# Patient Record
Sex: FEMALE | Race: WHITE | Employment: OTHER | URBAN - METROPOLITAN AREA
[De-identification: names, ages, dates, MRNs, and addresses within clinical notes are randomized per-mention and may not be internally consistent; named-entity substitution may affect disease eponyms.]

---

## 2017-01-05 RX ORDER — LISINOPRIL 10 MG/1
TABLET ORAL
Qty: 90 TABLET | Refills: 3 | Status: SHIPPED | OUTPATIENT
Start: 2017-01-05 | End: 2017-05-22 | Stop reason: SDUPTHER

## 2017-01-19 RX ORDER — ATORVASTATIN CALCIUM 40 MG/1
TABLET, FILM COATED ORAL
Qty: 30 TABLET | Refills: 11 | Status: SHIPPED | OUTPATIENT
Start: 2017-01-19 | End: 2018-01-16 | Stop reason: SDUPTHER

## 2017-01-24 ENCOUNTER — OFFICE VISIT (OUTPATIENT)
Dept: CARDIOLOGY CLINIC | Age: 72
End: 2017-01-24

## 2017-01-24 VITALS
SYSTOLIC BLOOD PRESSURE: 132 MMHG | HEIGHT: 66 IN | BODY MASS INDEX: 27.8 KG/M2 | WEIGHT: 173 LBS | HEART RATE: 58 BPM | DIASTOLIC BLOOD PRESSURE: 82 MMHG

## 2017-01-24 DIAGNOSIS — I42.9 CARDIOMYOPATHY (HCC): Chronic | ICD-10-CM

## 2017-01-24 DIAGNOSIS — I25.10 ATHEROSCLEROSIS OF NATIVE CORONARY ARTERY OF NATIVE HEART WITHOUT ANGINA PECTORIS: Primary | Chronic | ICD-10-CM

## 2017-01-24 DIAGNOSIS — R06.02 SHORTNESS OF BREATH: ICD-10-CM

## 2017-01-24 PROCEDURE — 99214 OFFICE O/P EST MOD 30 MIN: CPT | Performed by: INTERNAL MEDICINE

## 2017-01-24 PROCEDURE — G8427 DOCREV CUR MEDS BY ELIG CLIN: HCPCS | Performed by: INTERNAL MEDICINE

## 2017-01-24 PROCEDURE — 1036F TOBACCO NON-USER: CPT | Performed by: INTERNAL MEDICINE

## 2017-01-24 PROCEDURE — 4040F PNEUMOC VAC/ADMIN/RCVD: CPT | Performed by: INTERNAL MEDICINE

## 2017-01-24 PROCEDURE — G8484 FLU IMMUNIZE NO ADMIN: HCPCS | Performed by: INTERNAL MEDICINE

## 2017-01-24 PROCEDURE — 3014F SCREEN MAMMO DOC REV: CPT | Performed by: INTERNAL MEDICINE

## 2017-01-24 PROCEDURE — G8420 CALC BMI NORM PARAMETERS: HCPCS | Performed by: INTERNAL MEDICINE

## 2017-01-24 PROCEDURE — 3017F COLORECTAL CA SCREEN DOC REV: CPT | Performed by: INTERNAL MEDICINE

## 2017-01-24 PROCEDURE — 1123F ACP DISCUSS/DSCN MKR DOCD: CPT | Performed by: INTERNAL MEDICINE

## 2017-01-24 PROCEDURE — G8400 PT W/DXA NO RESULTS DOC: HCPCS | Performed by: INTERNAL MEDICINE

## 2017-01-24 PROCEDURE — 1090F PRES/ABSN URINE INCON ASSESS: CPT | Performed by: INTERNAL MEDICINE

## 2017-01-24 PROCEDURE — G8598 ASA/ANTIPLAT THER USED: HCPCS | Performed by: INTERNAL MEDICINE

## 2017-01-24 RX ORDER — CARVEDILOL 6.25 MG/1
TABLET ORAL
Qty: 180 TABLET | Refills: 3 | Status: SHIPPED | OUTPATIENT
Start: 2017-01-24 | End: 2017-01-24

## 2017-01-25 ENCOUNTER — OFFICE VISIT (OUTPATIENT)
Dept: CARDIOLOGY CLINIC | Age: 72
End: 2017-01-25

## 2017-01-25 ENCOUNTER — HOSPITAL ENCOUNTER (OUTPATIENT)
Dept: CT IMAGING | Age: 72
Discharge: HOME | End: 2017-01-25
Attending: INTERNAL MEDICINE | Admitting: INTERNAL MEDICINE

## 2017-01-25 ENCOUNTER — HOSPITAL ENCOUNTER (OUTPATIENT)
Dept: NON INVASIVE DIAGNOSTICS | Age: 72
Discharge: HOME | End: 2017-01-25
Attending: INTERNAL MEDICINE | Admitting: INTERNAL MEDICINE

## 2017-01-25 VITALS — WEIGHT: 173 LBS | BODY MASS INDEX: 27.92 KG/M2 | SYSTOLIC BLOOD PRESSURE: 118 MMHG | DIASTOLIC BLOOD PRESSURE: 60 MMHG

## 2017-01-25 DIAGNOSIS — R06.02 SHORTNESS OF BREATH: ICD-10-CM

## 2017-01-25 DIAGNOSIS — I25.10 ATHEROSCLEROTIC HEART DISEASE OF NATIVE CORONARY ARTERY WITHOUT ANGINA PECTORIS: ICD-10-CM

## 2017-01-25 DIAGNOSIS — I42.9 CARDIOMYOPATHY (HCC): Chronic | ICD-10-CM

## 2017-01-25 DIAGNOSIS — R09.02 HYPOXIA: ICD-10-CM

## 2017-01-25 DIAGNOSIS — I25.10 ATHEROSCLEROSIS OF NATIVE CORONARY ARTERY OF NATIVE HEART WITHOUT ANGINA PECTORIS: Primary | Chronic | ICD-10-CM

## 2017-01-25 LAB
ALBUMIN SERPL-MCNC: 4 G/DL (ref 3.4–5)
ANION GAP SERPL CALCULATED.3IONS-SCNC: 9 MMOL/L (ref 3–16)
BUN BLDV-MCNC: 12 MG/DL (ref 7–20)
CALCIUM SERPL-MCNC: 9.3 MG/DL (ref 8.3–10.6)
CHLORIDE BLD-SCNC: 97 MMOL/L (ref 99–110)
CO2: 31 MMOL/L (ref 21–32)
CREAT SERPL-MCNC: 0.7 MG/DL (ref 0.6–1.2)
GFR AFRICAN AMERICAN: >60
GFR NON-AFRICAN AMERICAN: >60
GLUCOSE BLD-MCNC: 127 MG/DL (ref 70–99)
LV EF: 50 %
LV EF: 62 %
LVEF MODALITY: NORMAL
LVEF MODALITY: NORMAL
PHOSPHORUS: 3.5 MG/DL (ref 2.5–4.9)
POTASSIUM SERPL-SCNC: 4.4 MMOL/L (ref 3.5–5.1)
SODIUM BLD-SCNC: 137 MMOL/L (ref 136–145)

## 2017-01-25 PROCEDURE — 3014F SCREEN MAMMO DOC REV: CPT | Performed by: INTERNAL MEDICINE

## 2017-01-25 PROCEDURE — G8420 CALC BMI NORM PARAMETERS: HCPCS | Performed by: INTERNAL MEDICINE

## 2017-01-25 PROCEDURE — 4040F PNEUMOC VAC/ADMIN/RCVD: CPT | Performed by: INTERNAL MEDICINE

## 2017-01-25 PROCEDURE — G8427 DOCREV CUR MEDS BY ELIG CLIN: HCPCS | Performed by: INTERNAL MEDICINE

## 2017-01-25 PROCEDURE — 1123F ACP DISCUSS/DSCN MKR DOCD: CPT | Performed by: INTERNAL MEDICINE

## 2017-01-25 PROCEDURE — G8484 FLU IMMUNIZE NO ADMIN: HCPCS | Performed by: INTERNAL MEDICINE

## 2017-01-25 PROCEDURE — 3017F COLORECTAL CA SCREEN DOC REV: CPT | Performed by: INTERNAL MEDICINE

## 2017-01-25 PROCEDURE — G8400 PT W/DXA NO RESULTS DOC: HCPCS | Performed by: INTERNAL MEDICINE

## 2017-01-25 PROCEDURE — 1036F TOBACCO NON-USER: CPT | Performed by: INTERNAL MEDICINE

## 2017-01-25 PROCEDURE — 1090F PRES/ABSN URINE INCON ASSESS: CPT | Performed by: INTERNAL MEDICINE

## 2017-01-25 PROCEDURE — G8598 ASA/ANTIPLAT THER USED: HCPCS | Performed by: INTERNAL MEDICINE

## 2017-01-25 PROCEDURE — 99214 OFFICE O/P EST MOD 30 MIN: CPT | Performed by: INTERNAL MEDICINE

## 2017-01-25 RX ORDER — AMINOPHYLLINE DIHYDRATE 25 MG/ML
100 INJECTION, SOLUTION INTRAVENOUS ONCE
Status: COMPLETED | OUTPATIENT
Start: 2017-01-25 | End: 2017-01-25

## 2017-01-25 RX ADMIN — AMINOPHYLLINE DIHYDRATE 100 MG: 25 INJECTION, SOLUTION INTRAVENOUS at 08:57

## 2017-01-26 ENCOUNTER — OFFICE VISIT (OUTPATIENT)
Dept: PULMONOLOGY | Age: 72
End: 2017-01-26

## 2017-01-26 ENCOUNTER — TELEPHONE (OUTPATIENT)
Dept: CARDIOLOGY CLINIC | Age: 72
End: 2017-01-26

## 2017-01-26 VITALS
DIASTOLIC BLOOD PRESSURE: 80 MMHG | HEIGHT: 66 IN | WEIGHT: 175 LBS | SYSTOLIC BLOOD PRESSURE: 152 MMHG | OXYGEN SATURATION: 94 % | HEART RATE: 57 BPM | RESPIRATION RATE: 18 BRPM | BODY MASS INDEX: 28.12 KG/M2

## 2017-01-26 DIAGNOSIS — R06.02 SHORTNESS OF BREATH: Primary | ICD-10-CM

## 2017-01-26 DIAGNOSIS — J43.2 CENTRILOBULAR EMPHYSEMA (HCC): ICD-10-CM

## 2017-01-26 PROCEDURE — G8420 CALC BMI NORM PARAMETERS: HCPCS | Performed by: INTERNAL MEDICINE

## 2017-01-26 PROCEDURE — 3017F COLORECTAL CA SCREEN DOC REV: CPT | Performed by: INTERNAL MEDICINE

## 2017-01-26 PROCEDURE — G8598 ASA/ANTIPLAT THER USED: HCPCS | Performed by: INTERNAL MEDICINE

## 2017-01-26 PROCEDURE — G8484 FLU IMMUNIZE NO ADMIN: HCPCS | Performed by: INTERNAL MEDICINE

## 2017-01-26 PROCEDURE — 99204 OFFICE O/P NEW MOD 45 MIN: CPT | Performed by: INTERNAL MEDICINE

## 2017-01-26 PROCEDURE — 3023F SPIROM DOC REV: CPT | Performed by: INTERNAL MEDICINE

## 2017-01-26 PROCEDURE — G8926 SPIRO NO PERF OR DOC: HCPCS | Performed by: INTERNAL MEDICINE

## 2017-01-26 PROCEDURE — G8427 DOCREV CUR MEDS BY ELIG CLIN: HCPCS | Performed by: INTERNAL MEDICINE

## 2017-01-26 PROCEDURE — 1123F ACP DISCUSS/DSCN MKR DOCD: CPT | Performed by: INTERNAL MEDICINE

## 2017-01-26 PROCEDURE — 4040F PNEUMOC VAC/ADMIN/RCVD: CPT | Performed by: INTERNAL MEDICINE

## 2017-01-26 PROCEDURE — 1036F TOBACCO NON-USER: CPT | Performed by: INTERNAL MEDICINE

## 2017-01-26 PROCEDURE — 3014F SCREEN MAMMO DOC REV: CPT | Performed by: INTERNAL MEDICINE

## 2017-01-26 PROCEDURE — G8400 PT W/DXA NO RESULTS DOC: HCPCS | Performed by: INTERNAL MEDICINE

## 2017-01-26 PROCEDURE — 1090F PRES/ABSN URINE INCON ASSESS: CPT | Performed by: INTERNAL MEDICINE

## 2017-01-26 RX ORDER — BUDESONIDE AND FORMOTEROL FUMARATE DIHYDRATE 160; 4.5 UG/1; UG/1
2 AEROSOL RESPIRATORY (INHALATION) 2 TIMES DAILY
COMMUNITY
End: 2017-03-03

## 2017-01-31 ENCOUNTER — HOSPITAL ENCOUNTER (OUTPATIENT)
Dept: PULMONOLOGY | Age: 72
Discharge: HOME | End: 2017-01-31
Attending: INTERNAL MEDICINE | Admitting: INTERNAL MEDICINE

## 2017-01-31 DIAGNOSIS — R06.02 SHORTNESS OF BREATH: ICD-10-CM

## 2017-01-31 LAB
DLCO: NORMAL ML/MMHG SEC
FEV1/FVC: NORMAL %
FEV1: NORMAL LITERS
FVC: NORMAL LITERS
TLC: NORMAL LITERS

## 2017-01-31 PROCEDURE — 94729 DIFFUSING CAPACITY: CPT | Performed by: INTERNAL MEDICINE

## 2017-01-31 PROCEDURE — 94727 GAS DIL/WSHOT DETER LNG VOL: CPT | Performed by: INTERNAL MEDICINE

## 2017-01-31 PROCEDURE — 94060 EVALUATION OF WHEEZING: CPT | Performed by: INTERNAL MEDICINE

## 2017-01-31 RX ORDER — ALBUTEROL SULFATE 90 UG/1
4 AEROSOL, METERED RESPIRATORY (INHALATION) ONCE
Status: COMPLETED | OUTPATIENT
Start: 2017-01-31 | End: 2017-01-31

## 2017-01-31 RX ADMIN — ALBUTEROL SULFATE 4 PUFF: 90 AEROSOL, METERED RESPIRATORY (INHALATION) at 09:41

## 2017-02-06 ENCOUNTER — TELEPHONE (OUTPATIENT)
Dept: PULMONOLOGY | Age: 72
End: 2017-02-06

## 2017-02-10 ENCOUNTER — OFFICE VISIT (OUTPATIENT)
Dept: CARDIOTHORACIC SURGERY | Age: 72
End: 2017-02-10

## 2017-02-10 VITALS
SYSTOLIC BLOOD PRESSURE: 132 MMHG | DIASTOLIC BLOOD PRESSURE: 68 MMHG | WEIGHT: 170.8 LBS | HEIGHT: 65 IN | BODY MASS INDEX: 28.46 KG/M2

## 2017-02-10 DIAGNOSIS — I25.10 ATHEROSCLEROSIS OF NATIVE CORONARY ARTERY OF NATIVE HEART WITHOUT ANGINA PECTORIS: Primary | ICD-10-CM

## 2017-02-10 PROCEDURE — G8420 CALC BMI NORM PARAMETERS: HCPCS | Performed by: SURGERY

## 2017-02-10 PROCEDURE — 1090F PRES/ABSN URINE INCON ASSESS: CPT | Performed by: SURGERY

## 2017-02-10 PROCEDURE — 1036F TOBACCO NON-USER: CPT | Performed by: SURGERY

## 2017-02-10 PROCEDURE — G8400 PT W/DXA NO RESULTS DOC: HCPCS | Performed by: SURGERY

## 2017-02-10 PROCEDURE — 3014F SCREEN MAMMO DOC REV: CPT | Performed by: SURGERY

## 2017-02-10 PROCEDURE — G8484 FLU IMMUNIZE NO ADMIN: HCPCS | Performed by: SURGERY

## 2017-02-10 PROCEDURE — G8427 DOCREV CUR MEDS BY ELIG CLIN: HCPCS | Performed by: SURGERY

## 2017-02-10 PROCEDURE — 4040F PNEUMOC VAC/ADMIN/RCVD: CPT | Performed by: SURGERY

## 2017-02-10 PROCEDURE — G8598 ASA/ANTIPLAT THER USED: HCPCS | Performed by: SURGERY

## 2017-02-10 PROCEDURE — 3017F COLORECTAL CA SCREEN DOC REV: CPT | Performed by: SURGERY

## 2017-02-10 PROCEDURE — 1123F ACP DISCUSS/DSCN MKR DOCD: CPT | Performed by: SURGERY

## 2017-02-10 PROCEDURE — 99203 OFFICE O/P NEW LOW 30 MIN: CPT | Performed by: SURGERY

## 2017-02-10 RX ORDER — BIOTIN 10 MG
10 TABLET ORAL DAILY
COMMUNITY
End: 2019-05-15

## 2017-02-10 RX ORDER — M-VIT,TX,IRON,MINS/CALC/FOLIC 27MG-0.4MG
1 TABLET ORAL DAILY
COMMUNITY
End: 2020-07-22

## 2017-02-16 ENCOUNTER — OFFICE VISIT (OUTPATIENT)
Dept: ORTHOPEDIC SURGERY | Age: 72
End: 2017-02-16

## 2017-02-16 VITALS
WEIGHT: 170 LBS | SYSTOLIC BLOOD PRESSURE: 138 MMHG | BODY MASS INDEX: 28.32 KG/M2 | DIASTOLIC BLOOD PRESSURE: 79 MMHG | HEIGHT: 65 IN | HEART RATE: 58 BPM

## 2017-02-16 DIAGNOSIS — M20.012 MALLET FINGER, ACQUIRED, LEFT: Primary | ICD-10-CM

## 2017-02-16 PROBLEM — M20.019 MALLET FINGER, ACQUIRED: Status: ACTIVE | Noted: 2017-02-16

## 2017-02-16 PROCEDURE — 99203 OFFICE O/P NEW LOW 30 MIN: CPT | Performed by: ORTHOPAEDIC SURGERY

## 2017-02-16 PROCEDURE — G8420 CALC BMI NORM PARAMETERS: HCPCS | Performed by: ORTHOPAEDIC SURGERY

## 2017-02-16 PROCEDURE — G8400 PT W/DXA NO RESULTS DOC: HCPCS | Performed by: ORTHOPAEDIC SURGERY

## 2017-02-16 PROCEDURE — 1123F ACP DISCUSS/DSCN MKR DOCD: CPT | Performed by: ORTHOPAEDIC SURGERY

## 2017-02-16 PROCEDURE — 3014F SCREEN MAMMO DOC REV: CPT | Performed by: ORTHOPAEDIC SURGERY

## 2017-02-16 PROCEDURE — 4040F PNEUMOC VAC/ADMIN/RCVD: CPT | Performed by: ORTHOPAEDIC SURGERY

## 2017-02-16 PROCEDURE — 1036F TOBACCO NON-USER: CPT | Performed by: ORTHOPAEDIC SURGERY

## 2017-02-16 PROCEDURE — 3017F COLORECTAL CA SCREEN DOC REV: CPT | Performed by: ORTHOPAEDIC SURGERY

## 2017-02-16 PROCEDURE — G8598 ASA/ANTIPLAT THER USED: HCPCS | Performed by: ORTHOPAEDIC SURGERY

## 2017-02-16 PROCEDURE — G8427 DOCREV CUR MEDS BY ELIG CLIN: HCPCS | Performed by: ORTHOPAEDIC SURGERY

## 2017-02-16 PROCEDURE — 1090F PRES/ABSN URINE INCON ASSESS: CPT | Performed by: ORTHOPAEDIC SURGERY

## 2017-02-16 PROCEDURE — G8484 FLU IMMUNIZE NO ADMIN: HCPCS | Performed by: ORTHOPAEDIC SURGERY

## 2017-03-03 ENCOUNTER — OFFICE VISIT (OUTPATIENT)
Dept: CARDIOLOGY CLINIC | Age: 72
End: 2017-03-03

## 2017-03-03 VITALS
HEART RATE: 72 BPM | WEIGHT: 170 LBS | SYSTOLIC BLOOD PRESSURE: 132 MMHG | HEIGHT: 65 IN | DIASTOLIC BLOOD PRESSURE: 82 MMHG | BODY MASS INDEX: 28.32 KG/M2

## 2017-03-03 DIAGNOSIS — E78.5 HYPERLIPIDEMIA, UNSPECIFIED HYPERLIPIDEMIA TYPE: Chronic | ICD-10-CM

## 2017-03-03 DIAGNOSIS — I42.9 CARDIOMYOPATHY (HCC): Chronic | ICD-10-CM

## 2017-03-03 DIAGNOSIS — I25.10 ATHEROSCLEROSIS OF NATIVE CORONARY ARTERY OF NATIVE HEART WITHOUT ANGINA PECTORIS: Primary | Chronic | ICD-10-CM

## 2017-03-03 PROCEDURE — 1090F PRES/ABSN URINE INCON ASSESS: CPT | Performed by: INTERNAL MEDICINE

## 2017-03-03 PROCEDURE — 3014F SCREEN MAMMO DOC REV: CPT | Performed by: INTERNAL MEDICINE

## 2017-03-03 PROCEDURE — G8484 FLU IMMUNIZE NO ADMIN: HCPCS | Performed by: INTERNAL MEDICINE

## 2017-03-03 PROCEDURE — G8598 ASA/ANTIPLAT THER USED: HCPCS | Performed by: INTERNAL MEDICINE

## 2017-03-03 PROCEDURE — 99214 OFFICE O/P EST MOD 30 MIN: CPT | Performed by: INTERNAL MEDICINE

## 2017-03-03 PROCEDURE — G8400 PT W/DXA NO RESULTS DOC: HCPCS | Performed by: INTERNAL MEDICINE

## 2017-03-03 PROCEDURE — G8420 CALC BMI NORM PARAMETERS: HCPCS | Performed by: INTERNAL MEDICINE

## 2017-03-03 PROCEDURE — G8427 DOCREV CUR MEDS BY ELIG CLIN: HCPCS | Performed by: INTERNAL MEDICINE

## 2017-03-03 PROCEDURE — 1036F TOBACCO NON-USER: CPT | Performed by: INTERNAL MEDICINE

## 2017-03-03 PROCEDURE — 3017F COLORECTAL CA SCREEN DOC REV: CPT | Performed by: INTERNAL MEDICINE

## 2017-03-03 PROCEDURE — 4040F PNEUMOC VAC/ADMIN/RCVD: CPT | Performed by: INTERNAL MEDICINE

## 2017-03-03 PROCEDURE — 1123F ACP DISCUSS/DSCN MKR DOCD: CPT | Performed by: INTERNAL MEDICINE

## 2017-03-03 RX ORDER — ALBUTEROL SULFATE 90 UG/1
2 AEROSOL, METERED RESPIRATORY (INHALATION) EVERY 6 HOURS PRN
Qty: 1 INHALER | Refills: 2 | Status: SHIPPED | OUTPATIENT
Start: 2017-03-03 | End: 2018-11-12 | Stop reason: SDUPTHER

## 2017-03-09 ENCOUNTER — OFFICE VISIT (OUTPATIENT)
Dept: ORTHOPEDIC SURGERY | Age: 72
End: 2017-03-09

## 2017-03-09 VITALS
HEART RATE: 54 BPM | BODY MASS INDEX: 29.16 KG/M2 | HEIGHT: 65 IN | RESPIRATION RATE: 16 BRPM | WEIGHT: 175 LBS | DIASTOLIC BLOOD PRESSURE: 74 MMHG | SYSTOLIC BLOOD PRESSURE: 139 MMHG

## 2017-03-09 DIAGNOSIS — M20.012 MALLET FINGER, ACQUIRED, LEFT: Primary | ICD-10-CM

## 2017-03-09 PROCEDURE — G8484 FLU IMMUNIZE NO ADMIN: HCPCS | Performed by: ORTHOPAEDIC SURGERY

## 2017-03-09 PROCEDURE — 1090F PRES/ABSN URINE INCON ASSESS: CPT | Performed by: ORTHOPAEDIC SURGERY

## 2017-03-09 PROCEDURE — 1123F ACP DISCUSS/DSCN MKR DOCD: CPT | Performed by: ORTHOPAEDIC SURGERY

## 2017-03-09 PROCEDURE — 1036F TOBACCO NON-USER: CPT | Performed by: ORTHOPAEDIC SURGERY

## 2017-03-09 PROCEDURE — 3014F SCREEN MAMMO DOC REV: CPT | Performed by: ORTHOPAEDIC SURGERY

## 2017-03-09 PROCEDURE — 4040F PNEUMOC VAC/ADMIN/RCVD: CPT | Performed by: ORTHOPAEDIC SURGERY

## 2017-03-09 PROCEDURE — 99212 OFFICE O/P EST SF 10 MIN: CPT | Performed by: ORTHOPAEDIC SURGERY

## 2017-03-09 PROCEDURE — G8400 PT W/DXA NO RESULTS DOC: HCPCS | Performed by: ORTHOPAEDIC SURGERY

## 2017-03-09 PROCEDURE — G8598 ASA/ANTIPLAT THER USED: HCPCS | Performed by: ORTHOPAEDIC SURGERY

## 2017-03-09 PROCEDURE — G8427 DOCREV CUR MEDS BY ELIG CLIN: HCPCS | Performed by: ORTHOPAEDIC SURGERY

## 2017-03-09 PROCEDURE — 3017F COLORECTAL CA SCREEN DOC REV: CPT | Performed by: ORTHOPAEDIC SURGERY

## 2017-03-09 PROCEDURE — G8420 CALC BMI NORM PARAMETERS: HCPCS | Performed by: ORTHOPAEDIC SURGERY

## 2017-04-03 ENCOUNTER — OFFICE VISIT (OUTPATIENT)
Dept: ORTHOPEDIC SURGERY | Age: 72
End: 2017-04-03

## 2017-04-03 VITALS
DIASTOLIC BLOOD PRESSURE: 80 MMHG | SYSTOLIC BLOOD PRESSURE: 130 MMHG | BODY MASS INDEX: 27.32 KG/M2 | WEIGHT: 170 LBS | HEIGHT: 66 IN

## 2017-04-03 DIAGNOSIS — M20.012 MALLET FINGER, ACQUIRED, LEFT: Primary | ICD-10-CM

## 2017-04-03 PROCEDURE — 99212 OFFICE O/P EST SF 10 MIN: CPT | Performed by: ORTHOPAEDIC SURGERY

## 2017-04-03 PROCEDURE — G8400 PT W/DXA NO RESULTS DOC: HCPCS | Performed by: ORTHOPAEDIC SURGERY

## 2017-04-03 PROCEDURE — 4040F PNEUMOC VAC/ADMIN/RCVD: CPT | Performed by: ORTHOPAEDIC SURGERY

## 2017-04-03 PROCEDURE — G8598 ASA/ANTIPLAT THER USED: HCPCS | Performed by: ORTHOPAEDIC SURGERY

## 2017-04-03 PROCEDURE — 1036F TOBACCO NON-USER: CPT | Performed by: ORTHOPAEDIC SURGERY

## 2017-04-03 PROCEDURE — 3014F SCREEN MAMMO DOC REV: CPT | Performed by: ORTHOPAEDIC SURGERY

## 2017-04-03 PROCEDURE — G8427 DOCREV CUR MEDS BY ELIG CLIN: HCPCS | Performed by: ORTHOPAEDIC SURGERY

## 2017-04-03 PROCEDURE — 1090F PRES/ABSN URINE INCON ASSESS: CPT | Performed by: ORTHOPAEDIC SURGERY

## 2017-04-03 PROCEDURE — G8420 CALC BMI NORM PARAMETERS: HCPCS | Performed by: ORTHOPAEDIC SURGERY

## 2017-04-03 PROCEDURE — 3017F COLORECTAL CA SCREEN DOC REV: CPT | Performed by: ORTHOPAEDIC SURGERY

## 2017-04-03 PROCEDURE — 1123F ACP DISCUSS/DSCN MKR DOCD: CPT | Performed by: ORTHOPAEDIC SURGERY

## 2017-04-17 ENCOUNTER — TELEPHONE (OUTPATIENT)
Dept: CARDIOLOGY CLINIC | Age: 72
End: 2017-04-17

## 2017-04-17 ENCOUNTER — OFFICE VISIT (OUTPATIENT)
Dept: ORTHOPEDIC SURGERY | Age: 72
End: 2017-04-17

## 2017-04-17 VITALS
DIASTOLIC BLOOD PRESSURE: 76 MMHG | WEIGHT: 170 LBS | SYSTOLIC BLOOD PRESSURE: 112 MMHG | BODY MASS INDEX: 27.32 KG/M2 | HEIGHT: 66 IN | RESPIRATION RATE: 16 BRPM

## 2017-04-17 DIAGNOSIS — M20.012 MALLET FINGER, ACQUIRED, LEFT: Primary | ICD-10-CM

## 2017-04-17 PROCEDURE — G8427 DOCREV CUR MEDS BY ELIG CLIN: HCPCS | Performed by: ORTHOPAEDIC SURGERY

## 2017-04-17 PROCEDURE — G8598 ASA/ANTIPLAT THER USED: HCPCS | Performed by: ORTHOPAEDIC SURGERY

## 2017-04-17 PROCEDURE — 1036F TOBACCO NON-USER: CPT | Performed by: ORTHOPAEDIC SURGERY

## 2017-04-17 PROCEDURE — 1123F ACP DISCUSS/DSCN MKR DOCD: CPT | Performed by: ORTHOPAEDIC SURGERY

## 2017-04-17 PROCEDURE — 4040F PNEUMOC VAC/ADMIN/RCVD: CPT | Performed by: ORTHOPAEDIC SURGERY

## 2017-04-17 PROCEDURE — 1090F PRES/ABSN URINE INCON ASSESS: CPT | Performed by: ORTHOPAEDIC SURGERY

## 2017-04-17 PROCEDURE — 3014F SCREEN MAMMO DOC REV: CPT | Performed by: ORTHOPAEDIC SURGERY

## 2017-04-17 PROCEDURE — G8420 CALC BMI NORM PARAMETERS: HCPCS | Performed by: ORTHOPAEDIC SURGERY

## 2017-04-17 PROCEDURE — 99212 OFFICE O/P EST SF 10 MIN: CPT | Performed by: ORTHOPAEDIC SURGERY

## 2017-04-17 PROCEDURE — 3017F COLORECTAL CA SCREEN DOC REV: CPT | Performed by: ORTHOPAEDIC SURGERY

## 2017-04-17 PROCEDURE — G8400 PT W/DXA NO RESULTS DOC: HCPCS | Performed by: ORTHOPAEDIC SURGERY

## 2017-04-24 ENCOUNTER — OFFICE VISIT (OUTPATIENT)
Dept: VASCULAR SURGERY | Age: 72
End: 2017-04-24

## 2017-04-24 VITALS
SYSTOLIC BLOOD PRESSURE: 132 MMHG | HEIGHT: 66 IN | BODY MASS INDEX: 27.64 KG/M2 | DIASTOLIC BLOOD PRESSURE: 72 MMHG | WEIGHT: 172 LBS

## 2017-04-24 DIAGNOSIS — I73.9 PERIPHERAL VASCULAR DISEASE (HCC): Primary | ICD-10-CM

## 2017-04-24 PROCEDURE — G8420 CALC BMI NORM PARAMETERS: HCPCS | Performed by: SURGERY

## 2017-04-24 PROCEDURE — G8427 DOCREV CUR MEDS BY ELIG CLIN: HCPCS | Performed by: SURGERY

## 2017-04-24 PROCEDURE — 3014F SCREEN MAMMO DOC REV: CPT | Performed by: SURGERY

## 2017-04-24 PROCEDURE — 3017F COLORECTAL CA SCREEN DOC REV: CPT | Performed by: SURGERY

## 2017-04-24 PROCEDURE — 1036F TOBACCO NON-USER: CPT | Performed by: SURGERY

## 2017-04-24 PROCEDURE — 4040F PNEUMOC VAC/ADMIN/RCVD: CPT | Performed by: SURGERY

## 2017-04-24 PROCEDURE — 1090F PRES/ABSN URINE INCON ASSESS: CPT | Performed by: SURGERY

## 2017-04-24 PROCEDURE — G8598 ASA/ANTIPLAT THER USED: HCPCS | Performed by: SURGERY

## 2017-04-24 PROCEDURE — 99212 OFFICE O/P EST SF 10 MIN: CPT | Performed by: SURGERY

## 2017-04-24 PROCEDURE — G8400 PT W/DXA NO RESULTS DOC: HCPCS | Performed by: SURGERY

## 2017-04-24 PROCEDURE — 1123F ACP DISCUSS/DSCN MKR DOCD: CPT | Performed by: SURGERY

## 2017-05-01 ENCOUNTER — OFFICE VISIT (OUTPATIENT)
Dept: PULMONOLOGY | Age: 72
End: 2017-05-01

## 2017-05-01 VITALS
HEIGHT: 65 IN | WEIGHT: 172 LBS | HEART RATE: 57 BPM | DIASTOLIC BLOOD PRESSURE: 75 MMHG | OXYGEN SATURATION: 97 % | RESPIRATION RATE: 18 BRPM | BODY MASS INDEX: 28.66 KG/M2 | SYSTOLIC BLOOD PRESSURE: 150 MMHG

## 2017-05-01 DIAGNOSIS — Z23 NEED FOR VACCINATION WITH 13-POLYVALENT PNEUMOCOCCAL CONJUGATE VACCINE: ICD-10-CM

## 2017-05-01 DIAGNOSIS — J43.2 CENTRILOBULAR EMPHYSEMA (HCC): Primary | ICD-10-CM

## 2017-05-01 DIAGNOSIS — J44.9 MODERATE COPD (CHRONIC OBSTRUCTIVE PULMONARY DISEASE) (HCC): ICD-10-CM

## 2017-05-01 PROCEDURE — 3023F SPIROM DOC REV: CPT | Performed by: INTERNAL MEDICINE

## 2017-05-01 PROCEDURE — G8926 SPIRO NO PERF OR DOC: HCPCS | Performed by: INTERNAL MEDICINE

## 2017-05-01 PROCEDURE — G0009 ADMIN PNEUMOCOCCAL VACCINE: HCPCS | Performed by: INTERNAL MEDICINE

## 2017-05-01 PROCEDURE — G8427 DOCREV CUR MEDS BY ELIG CLIN: HCPCS | Performed by: INTERNAL MEDICINE

## 2017-05-01 PROCEDURE — G8420 CALC BMI NORM PARAMETERS: HCPCS | Performed by: INTERNAL MEDICINE

## 2017-05-01 PROCEDURE — G8400 PT W/DXA NO RESULTS DOC: HCPCS | Performed by: INTERNAL MEDICINE

## 2017-05-01 PROCEDURE — 3014F SCREEN MAMMO DOC REV: CPT | Performed by: INTERNAL MEDICINE

## 2017-05-01 PROCEDURE — 1123F ACP DISCUSS/DSCN MKR DOCD: CPT | Performed by: INTERNAL MEDICINE

## 2017-05-01 PROCEDURE — 99213 OFFICE O/P EST LOW 20 MIN: CPT | Performed by: INTERNAL MEDICINE

## 2017-05-01 PROCEDURE — 1036F TOBACCO NON-USER: CPT | Performed by: INTERNAL MEDICINE

## 2017-05-01 PROCEDURE — G8598 ASA/ANTIPLAT THER USED: HCPCS | Performed by: INTERNAL MEDICINE

## 2017-05-01 PROCEDURE — 90670 PCV13 VACCINE IM: CPT | Performed by: INTERNAL MEDICINE

## 2017-05-01 PROCEDURE — 4040F PNEUMOC VAC/ADMIN/RCVD: CPT | Performed by: INTERNAL MEDICINE

## 2017-05-01 PROCEDURE — 1090F PRES/ABSN URINE INCON ASSESS: CPT | Performed by: INTERNAL MEDICINE

## 2017-05-01 PROCEDURE — 3017F COLORECTAL CA SCREEN DOC REV: CPT | Performed by: INTERNAL MEDICINE

## 2017-05-04 ENCOUNTER — TELEPHONE (OUTPATIENT)
Dept: PULMONOLOGY | Age: 72
End: 2017-05-04

## 2017-05-18 ENCOUNTER — HOSPITAL ENCOUNTER (OUTPATIENT)
Dept: OTHER | Age: 72
Discharge: HOME | End: 2017-05-31
Attending: INTERNAL MEDICINE | Admitting: INTERNAL MEDICINE

## 2017-05-18 PROCEDURE — 94620 PR PULMONARY STRESS TESTING,SIMPLE: CPT | Performed by: INTERNAL MEDICINE

## 2017-05-22 RX ORDER — LISINOPRIL 10 MG/1
TABLET ORAL
Qty: 90 TABLET | Refills: 3 | Status: SHIPPED | OUTPATIENT
Start: 2017-05-22 | End: 2017-05-23

## 2017-05-23 ENCOUNTER — TELEPHONE (OUTPATIENT)
Dept: CARDIOLOGY CLINIC | Age: 72
End: 2017-05-23

## 2017-05-23 ENCOUNTER — HOSPITAL ENCOUNTER (OUTPATIENT)
Dept: CARDIAC REHAB | Age: 72
Discharge: HOME | End: 2017-05-24
Admitting: INTERNAL MEDICINE

## 2017-05-23 VITALS — BODY MASS INDEX: 28.61 KG/M2 | WEIGHT: 171.9 LBS

## 2017-05-23 RX ORDER — LISINOPRIL 10 MG/1
10 TABLET ORAL 2 TIMES DAILY
Qty: 180 TABLET | Refills: 3 | Status: SHIPPED | OUTPATIENT
Start: 2017-05-23 | End: 2018-03-08 | Stop reason: SDUPTHER

## 2017-05-25 ENCOUNTER — HOSPITAL ENCOUNTER (OUTPATIENT)
Dept: CARDIAC REHAB | Age: 72
Discharge: HOME | End: 2017-05-26
Admitting: INTERNAL MEDICINE

## 2017-05-25 VITALS — BODY MASS INDEX: 28.94 KG/M2 | WEIGHT: 173.9 LBS

## 2017-05-30 ENCOUNTER — HOSPITAL ENCOUNTER (OUTPATIENT)
Dept: CARDIAC REHAB | Age: 72
Discharge: HOME | End: 2017-05-31
Admitting: INTERNAL MEDICINE

## 2017-06-01 ENCOUNTER — HOSPITAL ENCOUNTER (OUTPATIENT)
Dept: CARDIAC REHAB | Age: 72
Discharge: HOME | End: 2017-06-02
Admitting: INTERNAL MEDICINE

## 2017-06-01 VITALS — BODY MASS INDEX: 28.89 KG/M2 | WEIGHT: 173.6 LBS

## 2017-06-06 ENCOUNTER — HOSPITAL ENCOUNTER (OUTPATIENT)
Dept: CARDIAC REHAB | Age: 72
Discharge: HOME | End: 2017-06-07
Admitting: INTERNAL MEDICINE

## 2017-06-06 VITALS — BODY MASS INDEX: 28.99 KG/M2 | WEIGHT: 174.2 LBS

## 2017-06-08 ENCOUNTER — HOSPITAL ENCOUNTER (OUTPATIENT)
Dept: CARDIAC REHAB | Age: 72
Discharge: HOME | End: 2017-06-09
Admitting: INTERNAL MEDICINE

## 2017-06-08 VITALS — BODY MASS INDEX: 28.84 KG/M2 | WEIGHT: 173.3 LBS

## 2017-06-15 ENCOUNTER — HOSPITAL ENCOUNTER (OUTPATIENT)
Dept: CARDIAC REHAB | Age: 72
Discharge: HOME | End: 2017-06-16
Admitting: INTERNAL MEDICINE

## 2017-06-15 VITALS — WEIGHT: 172.4 LBS | BODY MASS INDEX: 28.69 KG/M2

## 2017-06-20 ENCOUNTER — HOSPITAL ENCOUNTER (OUTPATIENT)
Dept: CARDIAC REHAB | Age: 72
Discharge: HOME | End: 2017-06-21
Admitting: INTERNAL MEDICINE

## 2017-06-20 VITALS — WEIGHT: 173.1 LBS | BODY MASS INDEX: 28.81 KG/M2

## 2017-06-22 ENCOUNTER — HOSPITAL ENCOUNTER (OUTPATIENT)
Dept: CARDIAC REHAB | Age: 72
Discharge: HOME | End: 2017-06-23
Admitting: INTERNAL MEDICINE

## 2017-06-22 VITALS — WEIGHT: 172.3 LBS | BODY MASS INDEX: 28.67 KG/M2

## 2017-06-27 ENCOUNTER — HOSPITAL ENCOUNTER (OUTPATIENT)
Dept: CARDIAC REHAB | Age: 72
Discharge: HOME | End: 2017-06-28
Admitting: INTERNAL MEDICINE

## 2017-06-27 VITALS — BODY MASS INDEX: 28.91 KG/M2 | WEIGHT: 173.7 LBS

## 2017-07-06 ENCOUNTER — HOSPITAL ENCOUNTER (OUTPATIENT)
Dept: CARDIAC REHAB | Age: 72
Discharge: HOME | End: 2017-07-07
Admitting: INTERNAL MEDICINE

## 2017-07-11 ENCOUNTER — HOSPITAL ENCOUNTER (OUTPATIENT)
Dept: CARDIAC REHAB | Age: 72
Discharge: HOME | End: 2017-07-12
Admitting: INTERNAL MEDICINE

## 2017-07-11 VITALS — BODY MASS INDEX: 28.64 KG/M2 | WEIGHT: 172.1 LBS

## 2017-07-13 ENCOUNTER — HOSPITAL ENCOUNTER (OUTPATIENT)
Dept: CARDIAC REHAB | Age: 72
Discharge: HOME | End: 2017-07-14
Admitting: INTERNAL MEDICINE

## 2017-07-13 VITALS — BODY MASS INDEX: 28.47 KG/M2 | WEIGHT: 171.1 LBS

## 2017-07-18 ENCOUNTER — HOSPITAL ENCOUNTER (OUTPATIENT)
Dept: CARDIAC REHAB | Age: 72
Discharge: HOME | End: 2017-07-19
Admitting: INTERNAL MEDICINE

## 2017-07-18 VITALS — BODY MASS INDEX: 28.62 KG/M2 | WEIGHT: 172 LBS

## 2017-07-20 ENCOUNTER — HOSPITAL ENCOUNTER (OUTPATIENT)
Dept: CARDIAC REHAB | Age: 72
Discharge: HOME | End: 2017-07-21
Admitting: INTERNAL MEDICINE

## 2017-07-20 VITALS — WEIGHT: 172.5 LBS | BODY MASS INDEX: 28.71 KG/M2

## 2017-07-25 ENCOUNTER — HOSPITAL ENCOUNTER (OUTPATIENT)
Dept: CARDIAC REHAB | Age: 72
Discharge: HOME | End: 2017-07-26
Admitting: INTERNAL MEDICINE

## 2017-07-25 VITALS — BODY MASS INDEX: 28.62 KG/M2 | WEIGHT: 172 LBS

## 2017-07-27 ENCOUNTER — HOSPITAL ENCOUNTER (OUTPATIENT)
Dept: CARDIAC REHAB | Age: 72
Discharge: HOME | End: 2017-07-28
Admitting: INTERNAL MEDICINE

## 2017-07-27 VITALS — WEIGHT: 171 LBS | BODY MASS INDEX: 28.46 KG/M2

## 2017-08-01 ENCOUNTER — HOSPITAL ENCOUNTER (OUTPATIENT)
Dept: CARDIAC REHAB | Age: 72
Discharge: HOME | End: 2017-08-02
Admitting: INTERNAL MEDICINE

## 2017-08-01 VITALS — BODY MASS INDEX: 28.36 KG/M2 | WEIGHT: 170.4 LBS

## 2017-08-03 ENCOUNTER — HOSPITAL ENCOUNTER (OUTPATIENT)
Dept: CARDIAC REHAB | Age: 72
Discharge: HOME | End: 2017-08-04
Admitting: INTERNAL MEDICINE

## 2017-08-03 VITALS — WEIGHT: 170 LBS | BODY MASS INDEX: 28.29 KG/M2

## 2017-08-08 ENCOUNTER — HOSPITAL ENCOUNTER (OUTPATIENT)
Dept: CARDIAC REHAB | Age: 72
Discharge: HOME | End: 2017-08-09
Admitting: INTERNAL MEDICINE

## 2017-08-08 VITALS — BODY MASS INDEX: 28.12 KG/M2 | WEIGHT: 169 LBS

## 2017-08-10 ENCOUNTER — HOSPITAL ENCOUNTER (OUTPATIENT)
Dept: CARDIAC REHAB | Age: 72
Discharge: HOME | End: 2017-08-11
Admitting: INTERNAL MEDICINE

## 2017-08-10 VITALS — BODY MASS INDEX: 28.12 KG/M2 | WEIGHT: 169 LBS

## 2017-08-15 ENCOUNTER — HOSPITAL ENCOUNTER (OUTPATIENT)
Dept: CARDIAC REHAB | Age: 72
Discharge: HOME | End: 2017-08-16
Admitting: INTERNAL MEDICINE

## 2017-08-15 VITALS — WEIGHT: 169 LBS | BODY MASS INDEX: 28.12 KG/M2

## 2017-08-17 ENCOUNTER — HOSPITAL ENCOUNTER (OUTPATIENT)
Dept: CARDIAC REHAB | Age: 72
Discharge: HOME | End: 2017-08-18
Admitting: INTERNAL MEDICINE

## 2017-08-17 VITALS — WEIGHT: 170 LBS | BODY MASS INDEX: 28.29 KG/M2

## 2017-08-22 ENCOUNTER — HOSPITAL ENCOUNTER (OUTPATIENT)
Dept: CARDIAC REHAB | Age: 72
Discharge: HOME | End: 2017-08-23
Admitting: INTERNAL MEDICINE

## 2017-08-22 VITALS — BODY MASS INDEX: 27.79 KG/M2 | WEIGHT: 167 LBS

## 2017-08-24 ENCOUNTER — HOSPITAL ENCOUNTER (OUTPATIENT)
Dept: CARDIAC REHAB | Age: 72
Discharge: HOME | End: 2017-08-25
Admitting: INTERNAL MEDICINE

## 2017-08-24 VITALS — BODY MASS INDEX: 27.87 KG/M2 | WEIGHT: 167.5 LBS

## 2017-08-31 ENCOUNTER — HOSPITAL ENCOUNTER (OUTPATIENT)
Dept: CARDIAC REHAB | Age: 72
Discharge: HOME | End: 2017-09-01
Admitting: INTERNAL MEDICINE

## 2017-08-31 VITALS — BODY MASS INDEX: 27.79 KG/M2 | WEIGHT: 167 LBS

## 2017-08-31 PROCEDURE — 94620 PR PULMONARY STRESS TESTING,SIMPLE: CPT | Performed by: INTERNAL MEDICINE

## 2017-10-30 ENCOUNTER — OFFICE VISIT (OUTPATIENT)
Dept: PULMONOLOGY | Age: 72
End: 2017-10-30

## 2017-10-30 VITALS
WEIGHT: 163 LBS | RESPIRATION RATE: 18 BRPM | BODY MASS INDEX: 27.83 KG/M2 | SYSTOLIC BLOOD PRESSURE: 118 MMHG | HEART RATE: 59 BPM | HEIGHT: 64 IN | DIASTOLIC BLOOD PRESSURE: 68 MMHG | OXYGEN SATURATION: 95 %

## 2017-10-30 DIAGNOSIS — J43.2 CENTRILOBULAR EMPHYSEMA (HCC): Primary | ICD-10-CM

## 2017-10-30 DIAGNOSIS — J44.9 MODERATE COPD (CHRONIC OBSTRUCTIVE PULMONARY DISEASE) (HCC): ICD-10-CM

## 2017-10-30 PROCEDURE — 3023F SPIROM DOC REV: CPT | Performed by: INTERNAL MEDICINE

## 2017-10-30 PROCEDURE — 3017F COLORECTAL CA SCREEN DOC REV: CPT | Performed by: INTERNAL MEDICINE

## 2017-10-30 PROCEDURE — 4040F PNEUMOC VAC/ADMIN/RCVD: CPT | Performed by: INTERNAL MEDICINE

## 2017-10-30 PROCEDURE — 99213 OFFICE O/P EST LOW 20 MIN: CPT | Performed by: INTERNAL MEDICINE

## 2017-10-30 PROCEDURE — G8427 DOCREV CUR MEDS BY ELIG CLIN: HCPCS | Performed by: INTERNAL MEDICINE

## 2017-10-30 PROCEDURE — 1090F PRES/ABSN URINE INCON ASSESS: CPT | Performed by: INTERNAL MEDICINE

## 2017-10-30 PROCEDURE — G8926 SPIRO NO PERF OR DOC: HCPCS | Performed by: INTERNAL MEDICINE

## 2017-10-30 PROCEDURE — 1036F TOBACCO NON-USER: CPT | Performed by: INTERNAL MEDICINE

## 2017-10-30 PROCEDURE — G8598 ASA/ANTIPLAT THER USED: HCPCS | Performed by: INTERNAL MEDICINE

## 2017-10-30 PROCEDURE — G8484 FLU IMMUNIZE NO ADMIN: HCPCS | Performed by: INTERNAL MEDICINE

## 2017-10-30 PROCEDURE — 3014F SCREEN MAMMO DOC REV: CPT | Performed by: INTERNAL MEDICINE

## 2017-10-30 PROCEDURE — 1123F ACP DISCUSS/DSCN MKR DOCD: CPT | Performed by: INTERNAL MEDICINE

## 2017-10-30 PROCEDURE — G8400 PT W/DXA NO RESULTS DOC: HCPCS | Performed by: INTERNAL MEDICINE

## 2017-10-30 PROCEDURE — G8419 CALC BMI OUT NRM PARAM NOF/U: HCPCS | Performed by: INTERNAL MEDICINE

## 2017-10-30 NOTE — LETTER
Pulmonary, Critical Care & Sleep  [address] [facility], [address] [facility] [address]  Phone: [phone]  Fax: [phone]      October 30, 2017       Patient: Maciej Queen   MR Number: [id number]   YOB: 1945   Date of Visit: 10/30/2017       Dear Dr. Gio Arnold:    I saw Becca Tan today for F/U visit. Below are the relevant portions of my assessment and plan of care. Assessment:    1. Centrilobular emphysema ([facility]     2. Moderate COPD (chronic obstructive pulmonary disease) ([facility]             Plan:    1. She has been doing very well  2. She finished pulmonary rehab course  3. Continue Dulera 200 BID, Spiriva daily and Albuterol PRN  4. RTC in 6-12 months  If you have questions, please do not hesitate to call me. I look forward to following Rosa Isela along with you.     Sincerely,        Kristal Wagner MD    CC providers:  Camden Carlson, [address] [address]

## 2017-10-30 NOTE — COMMUNICATION BODY
Assessment:     Assessment:    1. Centrilobular emphysema ([address]     2. Moderate COPD (chronic obstructive pulmonary disease) ([address]             Plan:     Plan:    1. She has been doing very well  2. She finished pulmonary rehab course  3.  Continue Dulera 200 BID, Spiriva daily and Albuterol PRN  4. RTC in 6-12 months

## 2017-10-30 NOTE — PROGRESS NOTES
Liana Puri is 71 y.o. female here for F/U visit, COPD. She finished pulmonary rehab course  No exacerbations reported    She was seen for Pulmonary Medicine consultation referred by Dr. Cinda Brooks for evaluation of SOB  She has hx of COPD and has been on Spiriva for many years per her PCP  She noted more SOB over the last few months    Her CTA was done 1-2017 and showed emphysema  EXAMINATION:   CTA OF THE CHEST 1/25/2017 3:03 pm       TECHNIQUE:   CTA of the chest was performed after the administration of intravenous   contrast.  Multiplanar reformatted images are provided for review.  MIP   images are provided for review. Dose modulation, iterative reconstruction,   and/or weight based adjustment of the mA/kV was utilized to reduce the   radiation dose to as low as reasonably achievable.       COMPARISON:   None.       HISTORY:   ORDERING SYSTEM PROVIDED HISTORY: Shortness of breath   TECHNOLOGIST PROVIDED HISTORY:   Ordering Physician Provided Reason for Exam: Shortness of breath, Hypoxia   Acuity: Unknown   Type of Exam: Unknown       FINDINGS:   Pulmonary Arteries: Pulmonary arteries are adequately opacified for   evaluation.  No evidence of intraluminal filling defect to suggest pulmonary   embolism.  Main pulmonary artery is normal in caliber.       Mediastinum: No evidence of mediastinal lymphadenopathy.  The heart and   pericardium demonstrate no acute abnormality.  There is no acute abnormality   of the thoracic aorta.       Lungs/pleura:  The lungs are without acute process.  Mild diffuse   emphysematous changes are demonstrated.  No focal consolidation or pulmonary   edema.  No evidence of pleural effusion or pneumothorax.       Upper Abdomen: Limited images of the upper abdomen are remarkable for   numerous cysts in the liver.  The largest cyst is approximately 2 cm.  .       Soft Tissues/Bones: No acute bone or soft tissue abnormality.  Patient status   post crossover axillary axillary bypass which does not appear to be patent.           Impression   1. No acute pulmonary emboli, aortic dissection or aneurysm   2. Diffuse emphysema   3. Axillary axillary bypass graft which does not appear to be patent. She smoked for 30 years and quitted 1990  PFT showed moderate COPD changes  She has been on Dulera 200 and Albuterol PRN      Past Medical History:   Diagnosis Date    CAD (coronary artery disease)     Cardiomyopathy ([address]     Emphysema     Hyperlipidemia     Hypertension     Myocardial infarct      Current Outpatient Prescriptions   Medication Sig Dispense Refill    lisinopril (PRINIVIL) 10 MG tablet Take 1 tablet by mouth 2 times daily 180 tablet 3    mometasone-formoterol (DULERA) 200-5 MCG/ACT inhaler Inhale 2 puffs into the lungs every 12 hours 1 Inhaler 6    albuterol sulfate  (90 BASE) MCG/ACT inhaler Inhale 2 puffs into the lungs every 6 hours as needed for Wheezing 1 Inhaler 2    Biotin (BIOTIN MAXIMUM STRENGTH) 10 MG tablet Take 10 mg by mouth daily      Multiple Vitamins-Minerals (THERAPEUTIC MULTIVITAMIN-MINERALS) tablet Take 1 tablet by mouth daily      tiotropium (SPIRIVA RESPIMAT) 2.5 MCG/ACT AERS inhaler Inhale 2 puffs into the lungs daily      atorvastatin (LIPITOR) 40 MG tablet TAKE ONE TABLET BY MOUTH DAILY 30 tablet 11    carvedilol (COREG) 6.25 MG tablet TAKE ONE TABLET BY MOUTH TWICE A DAY WITH MEALS 180 tablet 1    nitroGLYCERIN (NITROSTAT) 0.4 MG SL tablet Place 1 tablet under the tongue every 5 minutes as needed. 25 tablet 3    montelukast (SINGULAIR) 10 MG tablet Take 10 mg by mouth nightly.  aspirin 81 MG tablet Take 81 mg by mouth daily. No current facility-administered medications for this visit.         Family History   Problem Relation Age of Onset    Heart Failure Mother     Emphysema Mother     Heart Disease Mother     Liver Disease Father     Cancer Father      Social History     Social History    Marital status:  not nervous/anxious and is not hyperactive. Blood pressure 118/68, pulse 59, resp. rate 18, height 5' 4\" (1.626 m), weight 163 lb (73.9 kg), SpO2 95 %, not currently breastfeeding. Physical Exam   Constitutional: Oriented. Well-developed and well-nourished. No distress. HENT:   Head: Normocephalic and atraumatic. Mouth/Throat: Oropharynx is clear and moist. No oropharyngeal exudate. Eyes: Conjunctivae and extraocular motions are normal. Pupils are equal, round, and reactive to light. Right eye exhibits no discharge. Left eye exhibits no discharge. Neck: Normal range of motion. Neck supple. No JVD present. Carotid bruit is not present. No rigidity. No tracheal deviation present. No thyromegaly present. Cardiovascular: Normal rate, regular rhythm, S1&S2 and intact distal pulses. Pulmonary/Chest: Effort normal and breath sounds normal. No stridor. No respiratory distress. Has no wheezes. Has no rhonchi. Has no rales. Exhibits no tenderness. Abdominal: Soft. Bowel sounds are normal. Exhibits no shifting dullness, no distension and no mass. No tenderness. Has no rebound and no guarding. Musculoskeletal: Normal range of motion. Exhibits no edema and no tenderness. Lymphadenopathy:     Has no cervical adenopathy. Has no axillary adenopathy. Neurological: Alert and oriented. Has normal reflexes. No cranial nerve deficit. Exhibits normal muscle tone. Coordination normal.   Skin: Skin is warm and dry. No rash noted. No erythema. Psychiatric: Has a normal mood and affect. Behavior is normal. Thought content normal.      Assessment:    1. Centrilobular emphysema ([address]     2. Moderate COPD (chronic obstructive pulmonary disease) ([address]               Plan:    1. She has been doing very well  2. She finished pulmonary rehab course  3.  Continue Dulera 200 BID, Spiriva daily and Albuterol PRN  4. RTC in 6-12 months

## 2017-12-29 ENCOUNTER — HOSPITAL ENCOUNTER (OUTPATIENT)
Dept: NON INVASIVE DIAGNOSTICS | Age: 72
Discharge: HOME | End: 2017-12-29
Attending: INTERNAL MEDICINE | Admitting: INTERNAL MEDICINE

## 2017-12-29 DIAGNOSIS — R05.9 COUGH: ICD-10-CM

## 2018-01-16 RX ORDER — ATORVASTATIN CALCIUM 40 MG/1
TABLET, FILM COATED ORAL
Qty: 30 TABLET | Refills: 11 | Status: SHIPPED | OUTPATIENT
Start: 2018-01-16 | End: 2019-01-14 | Stop reason: SDUPTHER

## 2018-02-07 RX ORDER — CARVEDILOL 6.25 MG/1
TABLET ORAL
Qty: 180 TABLET | Refills: 2 | Status: SHIPPED | OUTPATIENT
Start: 2018-02-07 | End: 2018-11-03 | Stop reason: SDUPTHER

## 2018-03-08 ENCOUNTER — OFFICE VISIT (OUTPATIENT)
Dept: CARDIOLOGY CLINIC | Age: 73
End: 2018-03-08

## 2018-03-08 VITALS
BODY MASS INDEX: 26.33 KG/M2 | HEART RATE: 74 BPM | SYSTOLIC BLOOD PRESSURE: 132 MMHG | WEIGHT: 158 LBS | DIASTOLIC BLOOD PRESSURE: 60 MMHG | HEIGHT: 65 IN

## 2018-03-08 DIAGNOSIS — R06.02 SHORTNESS OF BREATH: ICD-10-CM

## 2018-03-08 DIAGNOSIS — I10 ESSENTIAL HYPERTENSION: ICD-10-CM

## 2018-03-08 DIAGNOSIS — I25.10 ATHEROSCLEROSIS OF NATIVE CORONARY ARTERY OF NATIVE HEART WITHOUT ANGINA PECTORIS: Primary | Chronic | ICD-10-CM

## 2018-03-08 DIAGNOSIS — I42.9 CARDIOMYOPATHY, UNSPECIFIED TYPE (HCC): Chronic | ICD-10-CM

## 2018-03-08 DIAGNOSIS — E78.5 HYPERLIPIDEMIA, UNSPECIFIED HYPERLIPIDEMIA TYPE: Chronic | ICD-10-CM

## 2018-03-08 PROCEDURE — 1090F PRES/ABSN URINE INCON ASSESS: CPT | Performed by: INTERNAL MEDICINE

## 2018-03-08 PROCEDURE — 4040F PNEUMOC VAC/ADMIN/RCVD: CPT | Performed by: INTERNAL MEDICINE

## 2018-03-08 PROCEDURE — 3014F SCREEN MAMMO DOC REV: CPT | Performed by: INTERNAL MEDICINE

## 2018-03-08 PROCEDURE — 3017F COLORECTAL CA SCREEN DOC REV: CPT | Performed by: INTERNAL MEDICINE

## 2018-03-08 PROCEDURE — 99214 OFFICE O/P EST MOD 30 MIN: CPT | Performed by: INTERNAL MEDICINE

## 2018-03-08 PROCEDURE — G8598 ASA/ANTIPLAT THER USED: HCPCS | Performed by: INTERNAL MEDICINE

## 2018-03-08 PROCEDURE — G8427 DOCREV CUR MEDS BY ELIG CLIN: HCPCS | Performed by: INTERNAL MEDICINE

## 2018-03-08 PROCEDURE — G8400 PT W/DXA NO RESULTS DOC: HCPCS | Performed by: INTERNAL MEDICINE

## 2018-03-08 PROCEDURE — 1123F ACP DISCUSS/DSCN MKR DOCD: CPT | Performed by: INTERNAL MEDICINE

## 2018-03-08 PROCEDURE — 1036F TOBACCO NON-USER: CPT | Performed by: INTERNAL MEDICINE

## 2018-03-08 PROCEDURE — G8484 FLU IMMUNIZE NO ADMIN: HCPCS | Performed by: INTERNAL MEDICINE

## 2018-03-08 PROCEDURE — G8419 CALC BMI OUT NRM PARAM NOF/U: HCPCS | Performed by: INTERNAL MEDICINE

## 2018-03-08 RX ORDER — LISINOPRIL 10 MG/1
10 TABLET ORAL 2 TIMES DAILY
Qty: 180 TABLET | Refills: 3 | Status: SHIPPED | OUTPATIENT
Start: 2018-03-08 | End: 2019-03-25 | Stop reason: SDUPTHER

## 2018-03-08 NOTE — LETTER
carvedilol (COREG) 6.25 MG tablet TAKE ONE TABLET BY MOUTH TWICE A DAY WITH MEALS Yes Arron Emerson MD   atorvastatin (LIPITOR) 40 MG tablet TAKE ONE TABLET BY MOUTH DAILY Yes Arron Emerson MD   mometasone-formoterol (DULERA) 200-5 MCG/ACT inhaler Inhale 2 puffs into the lungs every 12 hours Yes Saundra Guerrero MD   lisinopril (PRINIVIL) 10 MG tablet Take 1 tablet by mouth 2 times daily Yes Arron Emerson MD   albuterol sulfate  (90 BASE) MCG/ACT inhaler Inhale 2 puffs into the lungs every 6 hours as needed for Wheezing Yes Saundra Guerrero MD   Biotin (BIOTIN MAXIMUM STRENGTH) 10 MG tablet Take 10 mg by mouth daily Yes Historical Provider, MD   Multiple Vitamins-Minerals (THERAPEUTIC MULTIVITAMIN-MINERALS) tablet Take 1 tablet by mouth daily Yes Historical Provider, MD   tiotropium (SPIRIVA RESPIMAT) 2.5 MCG/ACT AERS inhaler Inhale 2 puffs into the lungs daily Yes Historical Provider, MD   nitroGLYCERIN (NITROSTAT) 0.4 MG SL tablet Place 1 tablet under the tongue every 5 minutes as needed. Yes Arron Emerson MD   montelukast (SINGULAIR) 10 MG tablet Take 10 mg by mouth nightly. Yes Historical Provider, MD   aspirin 81 MG tablet Take 81 mg by mouth daily. Yes Historical Provider, MD         Allergies:  Darvon [propoxyphene hcl] and Minocin [minocycline hcl]     [x] Medications and dosages reviewed. ROS:  [x]Full ROS obtained and negative except as mentioned in HPI      Physical Examination:    Vitals:    03/08/18 0929   BP: 132/60   Pulse: 74      /60 (Site: Left Arm, Position: Sitting, Cuff Size: Medium Adult)   Pulse 74   Ht 5' 5\" (1.651 m)   Wt 158 lb (71.7 kg)   Breastfeeding?  No   BMI 26.29 kg/m²      · GENERAL: Well developed, well nourished, No acute distress  · NEUROLOGICAL: Alert and oriented  · PSYCH: Calm affect  · SKIN: Warm and dry, small bruises on arms  · HEENT: Normocephalic, Sclera non-icteric, mucus membranes moist  · NECK: supple, JVP normal

## 2018-03-08 NOTE — COMMUNICATION BODY
[facility]  Cardiac Follow Up      Referring Provider:  Ashley Morrissey MD     Chief Complaint   Patient presents with    Coronary Artery Disease     Pt is having blood/bruise problems since doubling Lisinopril        History of Present Illness:  Mrs Dawna Ring is here in f/u for shortness of breath, CAD, HTN, hyperlipidemia, and cardiomyopathy. She has a history of asthma, quit smoking in 1994. She is doing well. Breathing fine. No chest pain, tightness or pressure. She is having some bruising that she relates to lisinopril     Past Medical History:   has a past medical history of CAD (coronary artery disease); Cardiomyopathy ([address] Emphysema; Hyperlipidemia; Hypertension; and Myocardial infarct. Surgical History:   has a past surgical history that includes Appendectomy; Tubal ligation; Breast biopsy; Cholecystectomy; Subclavian Bypass Graft (1989); Cardiac surgery (2009); Colonoscopy; and hysteroscopy (09/09/2016). Social History:   reports that she quit smoking about 23 years ago. She has a 37.00 pack-year smoking history. She quit smokeless tobacco use about 23 years ago. She reports that she drinks about 1.2 oz of alcohol per week . She reports that she does not use drugs. Family History:  family history includes Cancer in her father; Emphysema in her mother; Heart Disease in her mother; Heart Failure in her mother; Liver Disease in her father. Home Medications:  Prior to Visit Medications    Medication Sig Taking?  Authorizing Provider   carvedilol (COREG) 6.25 MG tablet TAKE ONE TABLET BY MOUTH TWICE A DAY WITH MEALS Yes Gege Flaherty MD   atorvastatin (LIPITOR) 40 MG tablet TAKE ONE TABLET BY MOUTH DAILY Yes Gege Flaherty MD   mometasone-formoterol [facility]) 200-5 MCG/ACT inhaler Inhale 2 puffs into the lungs every 12 hours Yes Tamir Neal MD   lisinopril (PRINIVIL) 10 MG tablet Take 1 tablet by mouth 2 times daily Yes Gege Flaherty MD   albuterol sulfate  (90 angina. Continue meds as above  4/09 angiogram showed non obstructive LAD and Cfx disease with vision stent RCA. Cardiac cath 4/19/13 -non obs CAD--  Myoview 1/17- fixed anterior septal c/w LBBB, no change, normal EF   2. Hyperlipemia -  Controlled On Lipitor 40 mg. Labs 8/16-- ldl 65, Needs repeat   3. HTN (hypertension) --/60 (Site: Left Arm, Position: Sitting, Cuff Size: Medium Adult)   Pulse 74   Ht 5' 5\" (1.651 m)   Wt 158 lb (71.7 kg)   Breastfeeding? No   BMI 26.29 kg/m²     Controlled   4. Cardiomyopathy. Stable on coreg and lisinopril. Continue current meds   ECHO (1/2017) EF=50% with abn septal motion c/w LBBB. Right heart appears normal. (EF was 35% per angiogram in 2009).     5.   Shortness of Breath/hypoxia-COPD, rImproved with inhalers, f/u pulmonary    Plan:  Labs  Refill meds  F/u 1 year    Thank you for allowing me to participate in the care of this individual.      Jeremias Lind M.D., [facility]

## 2018-04-06 DIAGNOSIS — I25.10 ATHEROSCLEROSIS OF NATIVE CORONARY ARTERY OF NATIVE HEART WITHOUT ANGINA PECTORIS: Chronic | ICD-10-CM

## 2018-04-06 DIAGNOSIS — E78.5 HYPERLIPIDEMIA, UNSPECIFIED HYPERLIPIDEMIA TYPE: Chronic | ICD-10-CM

## 2018-04-06 DIAGNOSIS — I10 ESSENTIAL HYPERTENSION: ICD-10-CM

## 2018-04-06 LAB
ALT SERPL-CCNC: 18 U/L (ref 10–40)
ANION GAP SERPL CALCULATED.3IONS-SCNC: 13 MMOL/L (ref 3–16)
AST SERPL-CCNC: 18 U/L (ref 15–37)
BUN BLDV-MCNC: 11 MG/DL (ref 7–20)
CALCIUM SERPL-MCNC: 9.2 MG/DL (ref 8.3–10.6)
CHLORIDE BLD-SCNC: 98 MMOL/L (ref 99–110)
CHOLESTEROL, TOTAL: 158 MG/DL (ref 0–199)
CO2: 27 MMOL/L (ref 21–32)
CREAT SERPL-MCNC: <0.5 MG/DL (ref 0.6–1.2)
GFR AFRICAN AMERICAN: >60
GFR NON-AFRICAN AMERICAN: >60
GLUCOSE BLD-MCNC: 91 MG/DL (ref 70–99)
HCT VFR BLD CALC: 38.8 % (ref 36–48)
HDLC SERPL-MCNC: 70 MG/DL (ref 40–60)
HEMOGLOBIN: 13.4 G/DL (ref 12–16)
LDL CHOLESTEROL CALCULATED: 77 MG/DL
MCH RBC QN AUTO: 32 PG (ref 26–34)
MCHC RBC AUTO-ENTMCNC: 34.4 G/DL (ref 31–36)
MCV RBC AUTO: 92.9 FL (ref 80–100)
PDW BLD-RTO: 13 % (ref 12.4–15.4)
PLATELET # BLD: 272 K/UL (ref 135–450)
PMV BLD AUTO: 7 FL (ref 5–10.5)
POTASSIUM SERPL-SCNC: 4.4 MMOL/L (ref 3.5–5.1)
RBC # BLD: 4.18 M/UL (ref 4–5.2)
SODIUM BLD-SCNC: 138 MMOL/L (ref 136–145)
TRIGL SERPL-MCNC: 57 MG/DL (ref 0–150)
VLDLC SERPL CALC-MCNC: 11 MG/DL
WBC # BLD: 5.2 K/UL (ref 4–11)

## 2018-05-07 ENCOUNTER — OFFICE VISIT (OUTPATIENT)
Dept: PULMONOLOGY | Age: 73
End: 2018-05-07

## 2018-05-07 VITALS
HEART RATE: 64 BPM | WEIGHT: 157 LBS | DIASTOLIC BLOOD PRESSURE: 67 MMHG | SYSTOLIC BLOOD PRESSURE: 127 MMHG | HEIGHT: 65 IN | RESPIRATION RATE: 18 BRPM | BODY MASS INDEX: 26.16 KG/M2 | OXYGEN SATURATION: 97 %

## 2018-05-07 DIAGNOSIS — R06.02 SHORTNESS OF BREATH: ICD-10-CM

## 2018-05-07 DIAGNOSIS — R93.89 ABNORMAL CXR: ICD-10-CM

## 2018-05-07 DIAGNOSIS — J43.2 CENTRILOBULAR EMPHYSEMA (HCC): ICD-10-CM

## 2018-05-07 DIAGNOSIS — J44.9 MODERATE COPD (CHRONIC OBSTRUCTIVE PULMONARY DISEASE) (HCC): Primary | ICD-10-CM

## 2018-05-07 PROCEDURE — G8419 CALC BMI OUT NRM PARAM NOF/U: HCPCS | Performed by: INTERNAL MEDICINE

## 2018-05-07 PROCEDURE — G8926 SPIRO NO PERF OR DOC: HCPCS | Performed by: INTERNAL MEDICINE

## 2018-05-07 PROCEDURE — G8427 DOCREV CUR MEDS BY ELIG CLIN: HCPCS | Performed by: INTERNAL MEDICINE

## 2018-05-07 PROCEDURE — 1123F ACP DISCUSS/DSCN MKR DOCD: CPT | Performed by: INTERNAL MEDICINE

## 2018-05-07 PROCEDURE — 4040F PNEUMOC VAC/ADMIN/RCVD: CPT | Performed by: INTERNAL MEDICINE

## 2018-05-07 PROCEDURE — G8400 PT W/DXA NO RESULTS DOC: HCPCS | Performed by: INTERNAL MEDICINE

## 2018-05-07 PROCEDURE — 1036F TOBACCO NON-USER: CPT | Performed by: INTERNAL MEDICINE

## 2018-05-07 PROCEDURE — 3017F COLORECTAL CA SCREEN DOC REV: CPT | Performed by: INTERNAL MEDICINE

## 2018-05-07 PROCEDURE — 3023F SPIROM DOC REV: CPT | Performed by: INTERNAL MEDICINE

## 2018-05-07 PROCEDURE — G8598 ASA/ANTIPLAT THER USED: HCPCS | Performed by: INTERNAL MEDICINE

## 2018-05-07 PROCEDURE — 1090F PRES/ABSN URINE INCON ASSESS: CPT | Performed by: INTERNAL MEDICINE

## 2018-05-07 PROCEDURE — 99214 OFFICE O/P EST MOD 30 MIN: CPT | Performed by: INTERNAL MEDICINE

## 2018-05-14 ENCOUNTER — HOSPITAL ENCOUNTER (OUTPATIENT)
Dept: CT IMAGING | Age: 73
Discharge: HOME | End: 2018-05-14
Attending: INTERNAL MEDICINE | Admitting: INTERNAL MEDICINE

## 2018-05-14 DIAGNOSIS — R93.89 ABNORMAL CXR: ICD-10-CM

## 2018-05-14 DIAGNOSIS — R06.02 SHORTNESS OF BREATH: ICD-10-CM

## 2018-05-17 ENCOUNTER — TELEPHONE (OUTPATIENT)
Dept: PULMONOLOGY | Age: 73
End: 2018-05-17

## 2018-05-17 DIAGNOSIS — Z87.891 PERSONAL HISTORY OF TOBACCO USE: Primary | ICD-10-CM

## 2018-11-05 RX ORDER — CARVEDILOL 6.25 MG/1
TABLET ORAL
Qty: 180 TABLET | Refills: 0 | Status: SHIPPED | OUTPATIENT
Start: 2018-11-05 | End: 2019-02-04 | Stop reason: SDUPTHER

## 2018-11-12 ENCOUNTER — OFFICE VISIT (OUTPATIENT)
Dept: PULMONOLOGY | Age: 73
End: 2018-11-12
Payer: MEDICARE

## 2018-11-12 VITALS
WEIGHT: 160 LBS | SYSTOLIC BLOOD PRESSURE: 156 MMHG | HEIGHT: 65 IN | DIASTOLIC BLOOD PRESSURE: 74 MMHG | BODY MASS INDEX: 26.66 KG/M2 | HEART RATE: 58 BPM | OXYGEN SATURATION: 96 % | RESPIRATION RATE: 18 BRPM

## 2018-11-12 DIAGNOSIS — J43.2 CENTRILOBULAR EMPHYSEMA (HCC): ICD-10-CM

## 2018-11-12 DIAGNOSIS — J44.9 COPD, MODERATE (HCC): Primary | ICD-10-CM

## 2018-11-12 DIAGNOSIS — J44.9 MODERATE COPD (CHRONIC OBSTRUCTIVE PULMONARY DISEASE) (HCC): Primary | ICD-10-CM

## 2018-11-12 DIAGNOSIS — R91.8 PULMONARY NODULES: ICD-10-CM

## 2018-11-12 PROCEDURE — G8598 ASA/ANTIPLAT THER USED: HCPCS | Performed by: INTERNAL MEDICINE

## 2018-11-12 PROCEDURE — 1090F PRES/ABSN URINE INCON ASSESS: CPT | Performed by: INTERNAL MEDICINE

## 2018-11-12 PROCEDURE — G8419 CALC BMI OUT NRM PARAM NOF/U: HCPCS | Performed by: INTERNAL MEDICINE

## 2018-11-12 PROCEDURE — 1036F TOBACCO NON-USER: CPT | Performed by: INTERNAL MEDICINE

## 2018-11-12 PROCEDURE — G8427 DOCREV CUR MEDS BY ELIG CLIN: HCPCS | Performed by: INTERNAL MEDICINE

## 2018-11-12 PROCEDURE — G8482 FLU IMMUNIZE ORDER/ADMIN: HCPCS | Performed by: INTERNAL MEDICINE

## 2018-11-12 PROCEDURE — G8926 SPIRO NO PERF OR DOC: HCPCS | Performed by: INTERNAL MEDICINE

## 2018-11-12 PROCEDURE — 1101F PT FALLS ASSESS-DOCD LE1/YR: CPT | Performed by: INTERNAL MEDICINE

## 2018-11-12 PROCEDURE — 1123F ACP DISCUSS/DSCN MKR DOCD: CPT | Performed by: INTERNAL MEDICINE

## 2018-11-12 PROCEDURE — 3017F COLORECTAL CA SCREEN DOC REV: CPT | Performed by: INTERNAL MEDICINE

## 2018-11-12 PROCEDURE — 99213 OFFICE O/P EST LOW 20 MIN: CPT | Performed by: INTERNAL MEDICINE

## 2018-11-12 PROCEDURE — 4040F PNEUMOC VAC/ADMIN/RCVD: CPT | Performed by: INTERNAL MEDICINE

## 2018-11-12 PROCEDURE — G8400 PT W/DXA NO RESULTS DOC: HCPCS | Performed by: INTERNAL MEDICINE

## 2018-11-12 PROCEDURE — 3023F SPIROM DOC REV: CPT | Performed by: INTERNAL MEDICINE

## 2018-11-12 RX ORDER — ALBUTEROL SULFATE 90 UG/1
2 AEROSOL, METERED RESPIRATORY (INHALATION) EVERY 6 HOURS PRN
Qty: 1 INHALER | Refills: 2 | Status: SHIPPED | OUTPATIENT
Start: 2018-11-12

## 2018-11-12 NOTE — LETTER
[facility] Pulmonary, [address] [facility], [address] [place] [address]  Phone: [phone]  Fax: [phone]      November 12, 2018       Patient: Ana Farr   MR Number: [id number]   YOB: 1945   Date of Visit: 11/12/2018       Dear Dr. Peter Aguilar MD      I saw Mrs. Chelsea Machado today for follow-up visit. Below are the relevant portions of my assessment and plan of care. Assessment:     Diagnosis Orders   1. Moderate COPD (chronic obstructive pulmonary disease) ([facility]     2. Centrilobular emphysema (HCC)     3. Pulmonary nodules         Plan:    1. She has been doing very well since last visit  2. I reviewed her last CT chest and explained findings on the monitor, with her previous smoking history and strong family history of lung cancer I recommended an annual CT chest screening  3. Continue Dulera 200 BID, Spiriva daily and Albuterol PRN  4. Repeat PFT prior to next visit  5. Return to see in 6-12 months or sooner if needed    If you have questions, please do not hesitate to call me. I look forward to following Rosa Isela along with you.       Sincerely,        Haley Callaway MD    CC providers:  Peter Aguilar, [address] [address]

## 2018-11-12 NOTE — COMMUNICATION BODY
Assessment:     Assessment:     Diagnosis Orders   1. Moderate COPD (chronic obstructive pulmonary disease) ([facility]     2. Centrilobular emphysema (HCC)     3. Pulmonary nodules             Plan:     Plan:    1. She has been doing very well since last visit  2. I reviewed her last CT chest and explained findings on the monitor, with her previous smoking history and strong family history of lung cancer I recommended an annual CT chest screening  3. Continue Dulera 200 BID, Spiriva daily and Albuterol PRN  4. Repeat PFT prior to next visit  5.   Return to see in 6-12 months or sooner if needed

## 2019-01-14 RX ORDER — ATORVASTATIN CALCIUM 40 MG/1
TABLET, FILM COATED ORAL
Qty: 30 TABLET | Refills: 2 | Status: SHIPPED | OUTPATIENT
Start: 2019-01-14 | End: 2019-02-22 | Stop reason: SDUPTHER

## 2019-02-04 RX ORDER — CARVEDILOL 6.25 MG/1
TABLET ORAL
Qty: 180 TABLET | Refills: 3 | Status: ON HOLD | OUTPATIENT
Start: 2019-02-04 | End: 2019-05-19 | Stop reason: HOSPADM

## 2019-02-25 RX ORDER — ATORVASTATIN CALCIUM 40 MG/1
TABLET, FILM COATED ORAL
Qty: 90 TABLET | Refills: 3 | Status: SHIPPED | OUTPATIENT
Start: 2019-02-25 | End: 2019-10-02 | Stop reason: SDUPTHER

## 2019-03-25 ENCOUNTER — OFFICE VISIT (OUTPATIENT)
Dept: CARDIOLOGY CLINIC | Age: 74
End: 2019-03-25
Payer: MEDICARE

## 2019-03-25 VITALS
SYSTOLIC BLOOD PRESSURE: 150 MMHG | WEIGHT: 162 LBS | HEART RATE: 61 BPM | DIASTOLIC BLOOD PRESSURE: 74 MMHG | HEIGHT: 65 IN | BODY MASS INDEX: 26.99 KG/M2

## 2019-03-25 DIAGNOSIS — I25.10 ATHEROSCLEROSIS OF NATIVE CORONARY ARTERY OF NATIVE HEART WITHOUT ANGINA PECTORIS: Primary | Chronic | ICD-10-CM

## 2019-03-25 DIAGNOSIS — R06.02 SHORTNESS OF BREATH: ICD-10-CM

## 2019-03-25 DIAGNOSIS — I42.9 CARDIOMYOPATHY, UNSPECIFIED TYPE (HCC): Chronic | ICD-10-CM

## 2019-03-25 DIAGNOSIS — I10 ESSENTIAL HYPERTENSION: ICD-10-CM

## 2019-03-25 DIAGNOSIS — E78.2 MIXED HYPERLIPIDEMIA: Chronic | ICD-10-CM

## 2019-03-25 PROCEDURE — G8427 DOCREV CUR MEDS BY ELIG CLIN: HCPCS | Performed by: INTERNAL MEDICINE

## 2019-03-25 PROCEDURE — 1036F TOBACCO NON-USER: CPT | Performed by: INTERNAL MEDICINE

## 2019-03-25 PROCEDURE — G8400 PT W/DXA NO RESULTS DOC: HCPCS | Performed by: INTERNAL MEDICINE

## 2019-03-25 PROCEDURE — G8598 ASA/ANTIPLAT THER USED: HCPCS | Performed by: INTERNAL MEDICINE

## 2019-03-25 PROCEDURE — 1090F PRES/ABSN URINE INCON ASSESS: CPT | Performed by: INTERNAL MEDICINE

## 2019-03-25 PROCEDURE — G8482 FLU IMMUNIZE ORDER/ADMIN: HCPCS | Performed by: INTERNAL MEDICINE

## 2019-03-25 PROCEDURE — 4040F PNEUMOC VAC/ADMIN/RCVD: CPT | Performed by: INTERNAL MEDICINE

## 2019-03-25 PROCEDURE — 1123F ACP DISCUSS/DSCN MKR DOCD: CPT | Performed by: INTERNAL MEDICINE

## 2019-03-25 PROCEDURE — 99214 OFFICE O/P EST MOD 30 MIN: CPT | Performed by: INTERNAL MEDICINE

## 2019-03-25 PROCEDURE — G8419 CALC BMI OUT NRM PARAM NOF/U: HCPCS | Performed by: INTERNAL MEDICINE

## 2019-03-25 PROCEDURE — 3017F COLORECTAL CA SCREEN DOC REV: CPT | Performed by: INTERNAL MEDICINE

## 2019-03-25 PROCEDURE — 1101F PT FALLS ASSESS-DOCD LE1/YR: CPT | Performed by: INTERNAL MEDICINE

## 2019-03-25 RX ORDER — LISINOPRIL 20 MG/1
20 TABLET ORAL DAILY
Qty: 90 TABLET | Refills: 3 | Status: SHIPPED | OUTPATIENT
Start: 2019-03-25 | End: 2019-10-02 | Stop reason: SDUPTHER

## 2019-04-03 ENCOUNTER — TELEPHONE (OUTPATIENT)
Dept: CARDIOLOGY CLINIC | Age: 74
End: 2019-04-03

## 2019-04-03 DIAGNOSIS — I10 ESSENTIAL HYPERTENSION: ICD-10-CM

## 2019-04-03 DIAGNOSIS — E78.2 MIXED HYPERLIPIDEMIA: Chronic | ICD-10-CM

## 2019-04-03 LAB
ALT SERPL-CCNC: 21 U/L (ref 10–40)
ANION GAP SERPL CALCULATED.3IONS-SCNC: 9 MMOL/L (ref 3–16)
AST SERPL-CCNC: 24 U/L (ref 15–37)
BUN BLDV-MCNC: 12 MG/DL (ref 7–20)
CALCIUM SERPL-MCNC: 9.5 MG/DL (ref 8.3–10.6)
CHLORIDE BLD-SCNC: 102 MMOL/L (ref 99–110)
CHOLESTEROL, TOTAL: 147 MG/DL (ref 0–199)
CO2: 27 MMOL/L (ref 21–32)
CREAT SERPL-MCNC: 0.5 MG/DL (ref 0.6–1.2)
GFR AFRICAN AMERICAN: >60
GFR NON-AFRICAN AMERICAN: >60
GLUCOSE BLD-MCNC: 99 MG/DL (ref 70–99)
HDLC SERPL-MCNC: 65 MG/DL (ref 40–60)
LDL CHOLESTEROL CALCULATED: 67 MG/DL
POTASSIUM SERPL-SCNC: 4.5 MMOL/L (ref 3.5–5.1)
SODIUM BLD-SCNC: 138 MMOL/L (ref 136–145)
TRIGL SERPL-MCNC: 76 MG/DL (ref 0–150)
VLDLC SERPL CALC-MCNC: 15 MG/DL

## 2019-04-03 NOTE — TELEPHONE ENCOUNTER
----- Message from Maxine Coles MD sent at 4/3/2019  3:55 PM EDT -----  Tell pt. their labs are good. F/u in clinic as planned.     [facility]

## 2019-04-05 ENCOUNTER — HOSPITAL ENCOUNTER (OUTPATIENT)
Dept: PULMONOLOGY | Age: 74
Discharge: HOME | End: 2019-04-05
Payer: MEDICARE

## 2019-04-05 ENCOUNTER — HOSPITAL ENCOUNTER (OUTPATIENT)
Dept: CT IMAGING | Age: 74
Discharge: HOME | End: 2019-04-05
Payer: MEDICARE

## 2019-04-05 VITALS — HEART RATE: 68 BPM | RESPIRATION RATE: 16 BRPM | OXYGEN SATURATION: 97 %

## 2019-04-05 DIAGNOSIS — Z87.891 PERSONAL HISTORY OF TOBACCO USE: ICD-10-CM

## 2019-04-05 DIAGNOSIS — J44.9 COPD, MODERATE (HCC): ICD-10-CM

## 2019-04-05 PROCEDURE — 71250 CT THORAX DX C-: CPT

## 2019-04-05 PROCEDURE — 94726 PLETHYSMOGRAPHY LUNG VOLUMES: CPT

## 2019-04-05 PROCEDURE — 94060 EVALUATION OF WHEEZING: CPT

## 2019-04-05 PROCEDURE — 94729 DIFFUSING CAPACITY: CPT

## 2019-04-05 PROCEDURE — 94760 N-INVAS EAR/PLS OXIMETRY 1: CPT

## 2019-04-05 PROCEDURE — 94010 BREATHING CAPACITY TEST: CPT

## 2019-04-05 PROCEDURE — 94200 LUNG FUNCTION TEST (MBC/MVV): CPT

## 2019-04-05 PROCEDURE — 94664 DEMO&/EVAL PT USE INHALER: CPT

## 2019-04-05 PROCEDURE — 6370000000 HC RX 637 (ALT 250 FOR IP): Performed by: INTERNAL MEDICINE

## 2019-04-05 RX ORDER — ALBUTEROL SULFATE 90 UG/1
4 AEROSOL, METERED RESPIRATORY (INHALATION) ONCE
Status: COMPLETED | OUTPATIENT
Start: 2019-04-05 | End: 2019-04-05

## 2019-04-05 RX ADMIN — Medication 4 PUFF: at 16:52

## 2019-04-09 LAB
DLCO %PRED: 55 %
DLCO PRED: NORMAL ML/MIN/MMHG
DLCO/VA %PRED: NORMAL %
DLCO/VA PRED: NORMAL ML/MIN/MMHG
DLCO/VA: NORMAL ML/MIN/MMHG
DLCO: NORMAL ML/MIN/MMHG
EXPIRATORY TIME: NORMAL SEC
FEF 25-75% %PRED-PRE: NORMAL L/SEC
FEF 25-75% PRED: NORMAL L/SEC
FEF 25-75%-PRE: NORMAL L/SEC
FEV1 %PRED-PRE: 58 %
FEV1 PRED: NORMAL L
FEV1/FVC %PRED-PRE: NORMAL %
FEV1/FVC PRED: NORMAL %
FEV1/FVC: 81 %
FEV1: NORMAL L
FVC %PRED-PRE: NORMAL %
FVC PRED: NORMAL L
FVC: NORMAL L
GAW %PRED: NORMAL %
GAW PRED: NORMAL L/S/CMH2O
GAW: NORMAL L/S/CMH2O
IC %PRED: NORMAL %
IC PRED: NORMAL L
IC: NORMAL L
MVV %PRED-PRE: NORMAL %
MVV PRED: NORMAL L/MIN
MVV-PRE: NORMAL L/MIN
PEF %PRED-PRE: NORMAL L/SEC
PEF PRED: NORMAL L/SEC
PEF-PRE: NORMAL L/SEC
RAW %PRED: NORMAL %
RAW PRED: NORMAL CMH2O/L/S
RAW: NORMAL CMH2O/L/S
RV %PRED: NORMAL %
RV PRED: NORMAL L
RV: NORMAL L
SVC %PRED: NORMAL %
SVC PRED: NORMAL L
SVC: NORMAL L
TLC %PRED: 98 %
TLC PRED: NORMAL L
TLC: NORMAL L
VA %PRED: NORMAL %
VA PRED: NORMAL L
VA: NORMAL L
VTG %PRED: NORMAL %
VTG PRED: NORMAL L
VTG: NORMAL L

## 2019-04-09 ASSESSMENT — PULMONARY FUNCTION TESTS
FEV1/FVC: 81
FEV1_PERCENT_PREDICTED_PRE: 58

## 2019-04-11 NOTE — PROCEDURES
[facility]                     [address] [facility], [address]                               PULMONARY FUNCTION    PATIENT NAME: Rony Davis               :        1945  MED REC NO:   [id number]                          ROOM:  ACCOUNT NO:   [de-identified]                           ADMIT DATE: 2019  PROVIDER:     Kelly Estrada MD    DATE OF PROCEDURE:  2019    INTERPRETATION:  Spirometry reveals decreased FVC at 2.1 liters, which  is 69% predicted. FEV1 is decreased at 1.29 liters, which is 56%  predicted. FEV1/FVC ratio is reduced at 61%. Lung volumes reveal  normal total lung capacity, vital capacity and residual volume. Diffusion capacity is reduced at 55% predicted. IMPRESSION:  Moderate restrictive lung disease.         Dany John MD    D: 2019 14:23:16       T: 04/10/2019 2:02:57     [email or web address]  Job#: [id number]     Doc#: [id number]    CC:

## 2019-04-12 ENCOUNTER — TELEPHONE (OUTPATIENT)
Dept: PULMONOLOGY | Age: 74
End: 2019-04-12

## 2019-04-12 NOTE — TELEPHONE ENCOUNTER
Patient calling for Results    What type of test? : PFT // CT CHEST     Who ordered the test? : [facility]    When was it done? :  4/9/2019 // 4/5/2019    Where was it done? : [facility]    Pt # [phone]

## 2019-05-15 ENCOUNTER — HOSPITAL ENCOUNTER (INPATIENT)
Age: 74
LOS: 4 days | Discharge: HOME | DRG: 195 | End: 2019-05-19
Attending: EMERGENCY MEDICINE | Admitting: INTERNAL MEDICINE
Payer: MEDICARE

## 2019-05-15 ENCOUNTER — APPOINTMENT (OUTPATIENT)
Dept: GENERAL RADIOLOGY | Age: 74
DRG: 195 | End: 2019-05-15
Payer: MEDICARE

## 2019-05-15 DIAGNOSIS — J18.9 PNEUMONIA DUE TO ORGANISM: Primary | ICD-10-CM

## 2019-05-15 DIAGNOSIS — R11.2 NON-INTRACTABLE VOMITING WITH NAUSEA, UNSPECIFIED VOMITING TYPE: ICD-10-CM

## 2019-05-15 DIAGNOSIS — J11.1 INFLUENZA: ICD-10-CM

## 2019-05-15 PROBLEM — J10.1 INFLUENZA A: Status: ACTIVE | Noted: 2019-05-15

## 2019-05-15 PROBLEM — J44.1 COPD EXACERBATION (HCC): Status: ACTIVE | Noted: 2019-05-15

## 2019-05-15 PROBLEM — J16.0 CAP (COMMUNITY ACQUIRED PNEUMONIA) DUE TO CHLAMYDIA SPECIES: Status: ACTIVE | Noted: 2019-05-15

## 2019-05-15 LAB
A/G RATIO: 1.3 (ref 1.1–2.2)
ALBUMIN SERPL-MCNC: 4 G/DL (ref 3.4–5)
ALP BLD-CCNC: 84 U/L (ref 40–129)
ALT SERPL-CCNC: 23 U/L (ref 10–40)
ANION GAP SERPL CALCULATED.3IONS-SCNC: 13 MMOL/L (ref 3–16)
AST SERPL-CCNC: 27 U/L (ref 15–37)
BASOPHILS ABSOLUTE: 0 K/UL (ref 0–0.2)
BASOPHILS RELATIVE PERCENT: 0.5 %
BILIRUB SERPL-MCNC: 0.6 MG/DL (ref 0–1)
BUN BLDV-MCNC: 9 MG/DL (ref 7–20)
CALCIUM SERPL-MCNC: 9.2 MG/DL (ref 8.3–10.6)
CHLORIDE BLD-SCNC: 95 MMOL/L (ref 99–110)
CO2: 23 MMOL/L (ref 21–32)
CREAT SERPL-MCNC: <0.5 MG/DL (ref 0.6–1.2)
EOSINOPHILS ABSOLUTE: 0 K/UL (ref 0–0.6)
EOSINOPHILS RELATIVE PERCENT: 0.1 %
GFR AFRICAN AMERICAN: >60
GFR NON-AFRICAN AMERICAN: >60
GLOBULIN: 3.2 G/DL
GLUCOSE BLD-MCNC: 107 MG/DL (ref 70–99)
HCT VFR BLD CALC: 41.9 % (ref 36–48)
HEMOGLOBIN: 14.7 G/DL (ref 12–16)
LACTIC ACID: 1.2 MMOL/L (ref 0.4–2)
LYMPHOCYTES ABSOLUTE: 1.5 K/UL (ref 1–5.1)
LYMPHOCYTES RELATIVE PERCENT: 35.1 %
MCH RBC QN AUTO: 30.9 PG (ref 26–34)
MCHC RBC AUTO-ENTMCNC: 35.2 G/DL (ref 31–36)
MCV RBC AUTO: 88 FL (ref 80–100)
MONOCYTES ABSOLUTE: 0.4 K/UL (ref 0–1.3)
MONOCYTES RELATIVE PERCENT: 8.9 %
NEUTROPHILS ABSOLUTE: 2.4 K/UL (ref 1.7–7.7)
NEUTROPHILS RELATIVE PERCENT: 55.4 %
PDW BLD-RTO: 13.1 % (ref 12.4–15.4)
PLATELET # BLD: 217 K/UL (ref 135–450)
PMV BLD AUTO: 6.6 FL (ref 5–10.5)
POTASSIUM SERPL-SCNC: 4 MMOL/L (ref 3.5–5.1)
RBC # BLD: 4.77 M/UL (ref 4–5.2)
SODIUM BLD-SCNC: 131 MMOL/L (ref 136–145)
TOTAL PROTEIN: 7.2 G/DL (ref 6.4–8.2)
TROPONIN: <0.01 NG/ML
WBC # BLD: 4.3 K/UL (ref 4–11)

## 2019-05-15 PROCEDURE — 87801 DETECT AGNT MULT DNA AMPLI: CPT

## 2019-05-15 PROCEDURE — 96374 THER/PROPH/DIAG INJ IV PUSH: CPT

## 2019-05-15 PROCEDURE — 94762 N-INVAS EAR/PLS OXIMTRY CONT: CPT

## 2019-05-15 PROCEDURE — 93005 ELECTROCARDIOGRAM TRACING: CPT | Performed by: PHYSICIAN ASSISTANT

## 2019-05-15 PROCEDURE — 2580000003 HC RX 258: Performed by: PHYSICIAN ASSISTANT

## 2019-05-15 PROCEDURE — 36415 COLL VENOUS BLD VENIPUNCTURE: CPT

## 2019-05-15 PROCEDURE — 99285 EMERGENCY DEPT VISIT HI MDM: CPT

## 2019-05-15 PROCEDURE — 6360000002 HC RX W HCPCS: Performed by: INTERNAL MEDICINE

## 2019-05-15 PROCEDURE — 85025 COMPLETE CBC W/AUTO DIFF WBC: CPT

## 2019-05-15 PROCEDURE — 96361 HYDRATE IV INFUSION ADD-ON: CPT

## 2019-05-15 PROCEDURE — 84484 ASSAY OF TROPONIN QUANT: CPT

## 2019-05-15 PROCEDURE — 94664 DEMO&/EVAL PT USE INHALER: CPT

## 2019-05-15 PROCEDURE — 1200000000 HC SEMI PRIVATE

## 2019-05-15 PROCEDURE — 94640 AIRWAY INHALATION TREATMENT: CPT

## 2019-05-15 PROCEDURE — 6370000000 HC RX 637 (ALT 250 FOR IP): Performed by: PHYSICIAN ASSISTANT

## 2019-05-15 PROCEDURE — 87040 BLOOD CULTURE FOR BACTERIA: CPT

## 2019-05-15 PROCEDURE — 2580000003 HC RX 258: Performed by: INTERNAL MEDICINE

## 2019-05-15 PROCEDURE — 83605 ASSAY OF LACTIC ACID: CPT

## 2019-05-15 PROCEDURE — 71045 X-RAY EXAM CHEST 1 VIEW: CPT

## 2019-05-15 PROCEDURE — 6370000000 HC RX 637 (ALT 250 FOR IP): Performed by: INTERNAL MEDICINE

## 2019-05-15 PROCEDURE — 80053 COMPREHEN METABOLIC PANEL: CPT

## 2019-05-15 PROCEDURE — 6360000002 HC RX W HCPCS: Performed by: PHYSICIAN ASSISTANT

## 2019-05-15 RX ORDER — ALBUTEROL SULFATE 90 UG/1
2 AEROSOL, METERED RESPIRATORY (INHALATION) EVERY 6 HOURS PRN
Status: DISCONTINUED | OUTPATIENT
Start: 2019-05-15 | End: 2019-05-19 | Stop reason: HOSPADM

## 2019-05-15 RX ORDER — OSELTAMIVIR PHOSPHATE 75 MG/1
75 CAPSULE ORAL 2 TIMES DAILY
COMMUNITY
End: 2019-10-02

## 2019-05-15 RX ORDER — ALBUTEROL SULFATE 2.5 MG/3ML
2.5 SOLUTION RESPIRATORY (INHALATION)
Status: DISCONTINUED | OUTPATIENT
Start: 2019-05-15 | End: 2019-05-19

## 2019-05-15 RX ORDER — LISINOPRIL 20 MG/1
20 TABLET ORAL DAILY
Status: DISCONTINUED | OUTPATIENT
Start: 2019-05-15 | End: 2019-05-19 | Stop reason: HOSPADM

## 2019-05-15 RX ORDER — SODIUM CHLORIDE 9 MG/ML
INJECTION, SOLUTION INTRAVENOUS CONTINUOUS
Status: DISCONTINUED | OUTPATIENT
Start: 2019-05-15 | End: 2019-05-19

## 2019-05-15 RX ORDER — FAMOTIDINE 20 MG/1
20 TABLET, FILM COATED ORAL 2 TIMES DAILY
Status: DISCONTINUED | OUTPATIENT
Start: 2019-05-15 | End: 2019-05-19 | Stop reason: HOSPADM

## 2019-05-15 RX ORDER — NITROGLYCERIN 0.4 MG/1
0.4 TABLET SUBLINGUAL EVERY 5 MIN PRN
Status: DISCONTINUED | OUTPATIENT
Start: 2019-05-15 | End: 2019-05-19 | Stop reason: HOSPADM

## 2019-05-15 RX ORDER — SODIUM CHLORIDE 0.9 % (FLUSH) 0.9 %
10 SYRINGE (ML) INJECTION PRN
Status: DISCONTINUED | OUTPATIENT
Start: 2019-05-15 | End: 2019-05-19 | Stop reason: HOSPADM

## 2019-05-15 RX ORDER — ATORVASTATIN CALCIUM 40 MG/1
40 TABLET, FILM COATED ORAL DAILY
Status: DISCONTINUED | OUTPATIENT
Start: 2019-05-15 | End: 2019-05-19 | Stop reason: HOSPADM

## 2019-05-15 RX ORDER — ONDANSETRON 2 MG/ML
4 INJECTION INTRAMUSCULAR; INTRAVENOUS ONCE
Status: COMPLETED | OUTPATIENT
Start: 2019-05-15 | End: 2019-05-15

## 2019-05-15 RX ORDER — SODIUM CHLORIDE 0.9 % (FLUSH) 0.9 %
10 SYRINGE (ML) INJECTION EVERY 12 HOURS SCHEDULED
Status: DISCONTINUED | OUTPATIENT
Start: 2019-05-15 | End: 2019-05-19 | Stop reason: HOSPADM

## 2019-05-15 RX ORDER — IPRATROPIUM BROMIDE AND ALBUTEROL SULFATE 2.5; .5 MG/3ML; MG/3ML
1 SOLUTION RESPIRATORY (INHALATION) ONCE
Status: COMPLETED | OUTPATIENT
Start: 2019-05-15 | End: 2019-05-15

## 2019-05-15 RX ORDER — OSELTAMIVIR PHOSPHATE 75 MG/1
75 CAPSULE ORAL 2 TIMES DAILY
Status: DISCONTINUED | OUTPATIENT
Start: 2019-05-15 | End: 2019-05-18

## 2019-05-15 RX ORDER — LORAZEPAM 0.5 MG/1
0.5 TABLET ORAL EVERY 4 HOURS PRN
Status: DISCONTINUED | OUTPATIENT
Start: 2019-05-15 | End: 2019-05-19 | Stop reason: HOSPADM

## 2019-05-15 RX ORDER — ACETAMINOPHEN 325 MG/1
650 TABLET ORAL EVERY 4 HOURS PRN
Status: DISCONTINUED | OUTPATIENT
Start: 2019-05-15 | End: 2019-05-19 | Stop reason: HOSPADM

## 2019-05-15 RX ORDER — MONTELUKAST SODIUM 10 MG/1
10 TABLET ORAL NIGHTLY
Status: DISCONTINUED | OUTPATIENT
Start: 2019-05-15 | End: 2019-05-19 | Stop reason: HOSPADM

## 2019-05-15 RX ORDER — CARVEDILOL 6.25 MG/1
6.25 TABLET ORAL 2 TIMES DAILY WITH MEALS
Status: DISCONTINUED | OUTPATIENT
Start: 2019-05-15 | End: 2019-05-18

## 2019-05-15 RX ORDER — ONDANSETRON 2 MG/ML
4 INJECTION INTRAMUSCULAR; INTRAVENOUS EVERY 6 HOURS PRN
Status: DISCONTINUED | OUTPATIENT
Start: 2019-05-15 | End: 2019-05-19 | Stop reason: HOSPADM

## 2019-05-15 RX ORDER — 0.9 % SODIUM CHLORIDE 0.9 %
1000 INTRAVENOUS SOLUTION INTRAVENOUS ONCE
Status: COMPLETED | OUTPATIENT
Start: 2019-05-15 | End: 2019-05-15

## 2019-05-15 RX ORDER — AZITHROMYCIN 250 MG/1
250 TABLET, FILM COATED ORAL DAILY
Status: ON HOLD | COMMUNITY
End: 2019-05-19 | Stop reason: HOSPADM

## 2019-05-15 RX ADMIN — ONDANSETRON 4 MG: 2 INJECTION INTRAMUSCULAR; INTRAVENOUS at 10:14

## 2019-05-15 RX ADMIN — MOMETASONE FUROATE AND FORMOTEROL FUMARATE DIHYDRATE 2 PUFF: 200; 5 AEROSOL RESPIRATORY (INHALATION) at 20:24

## 2019-05-15 RX ADMIN — Medication 10 ML: at 21:00

## 2019-05-15 RX ADMIN — MONTELUKAST SODIUM 10 MG: 10 TABLET, FILM COATED ORAL at 21:00

## 2019-05-15 RX ADMIN — FAMOTIDINE 20 MG: 20 TABLET ORAL at 21:00

## 2019-05-15 RX ADMIN — ACETAMINOPHEN 650 MG: 325 TABLET ORAL at 23:17

## 2019-05-15 RX ADMIN — CEFTRIAXONE 1 G: 1 INJECTION, POWDER, FOR SOLUTION INTRAMUSCULAR; INTRAVENOUS at 21:00

## 2019-05-15 RX ADMIN — AZITHROMYCIN MONOHYDRATE 500 MG: 500 INJECTION, POWDER, LYOPHILIZED, FOR SOLUTION INTRAVENOUS at 19:38

## 2019-05-15 RX ADMIN — SODIUM CHLORIDE: 9 INJECTION, SOLUTION INTRAVENOUS at 19:38

## 2019-05-15 RX ADMIN — SODIUM CHLORIDE 1000 ML: 9 INJECTION, SOLUTION INTRAVENOUS at 10:14

## 2019-05-15 RX ADMIN — IPRATROPIUM BROMIDE AND ALBUTEROL SULFATE 1 AMPULE: .5; 3 SOLUTION RESPIRATORY (INHALATION) at 10:25

## 2019-05-15 RX ADMIN — CARVEDILOL 6.25 MG: 6.25 TABLET, FILM COATED ORAL at 19:38

## 2019-05-15 RX ADMIN — OSELTAMIVIR PHOSPHATE 75 MG: 75 CAPSULE ORAL at 21:00

## 2019-05-15 RX ADMIN — ENOXAPARIN SODIUM 40 MG: 40 INJECTION SUBCUTANEOUS at 19:38

## 2019-05-15 RX ADMIN — LISINOPRIL 20 MG: 20 TABLET ORAL at 19:38

## 2019-05-15 RX ADMIN — ATORVASTATIN CALCIUM 40 MG: 40 TABLET, FILM COATED ORAL at 19:39

## 2019-05-15 ASSESSMENT — PAIN SCALES - GENERAL
PAINLEVEL_OUTOF10: 6
PAINLEVEL_OUTOF10: 4
PAINLEVEL_OUTOF10: 5
PAINLEVEL_OUTOF10: 0

## 2019-05-15 ASSESSMENT — PAIN DESCRIPTION - PAIN TYPE: TYPE: ACUTE PAIN

## 2019-05-15 ASSESSMENT — PAIN DESCRIPTION - PROGRESSION
CLINICAL_PROGRESSION: NOT CHANGED
CLINICAL_PROGRESSION: RESOLVED
CLINICAL_PROGRESSION: RESOLVED

## 2019-05-15 ASSESSMENT — PAIN DESCRIPTION - LOCATION: LOCATION: CHEST

## 2019-05-15 ASSESSMENT — PAIN DESCRIPTION - ONSET: ONSET: ON-GOING

## 2019-05-15 ASSESSMENT — PAIN DESCRIPTION - DESCRIPTORS: DESCRIPTORS: TIGHTNESS

## 2019-05-15 ASSESSMENT — PAIN DESCRIPTION - FREQUENCY: FREQUENCY: CONTINUOUS

## 2019-05-15 ASSESSMENT — PAIN - FUNCTIONAL ASSESSMENT: PAIN_FUNCTIONAL_ASSESSMENT: ACTIVITIES ARE NOT PREVENTED

## 2019-05-15 NOTE — PROGRESS NOTES
Pt arrived to unit via ED, Dx with CAP and Flu A. Placed in droplet precautions. VSS, pt oriented to room. Call light in reach, will monitor.

## 2019-05-15 NOTE — ED PROVIDER NOTES
palpitations and leg swelling. Gastrointestinal:  Negative for nausea, vomiting, abdominal pain, diarrhea, constipation and blood in stool. Genitourinary:  Negative for dysuria, urgency, hematuria, flank pain, vaginal bleeding, vaginal discharge and pelvic pain. Musculoskeletal:  Negative for myalgias, arthralgias, neck pain and neck stiffness. Neurological:  Positive for lightheadedness. Negative for dizziness, seizures, syncope, speech difficulty, weakness, numbness and headaches. Psychiatric/Behavioral:  Negative for suicidal ideas, hallucinations, confusion, sleep disturbance and agitation. Except as noted above the remainder of the review of systems was reviewed and negative. PAST MEDICAL HISTORY         Diagnosis Date    CAD (coronary artery disease)     Cardiomyopathy ([facility]     Emphysema     Hyperlipidemia     Hypertension     Myocardial infarct [facility])        SURGICAL HISTORY           Procedure Laterality Date    APPENDECTOMY      BREAST BIOPSY      CARDIAC SURGERY  2009    RCA    CHOLECYSTECTOMY      COLONOSCOPY      HYSTEROSCOPY  09/09/2016    HYSTEROSCOPY DILATATION AND CURETTAGE WITH MYOSURE    SUBCLAVIAN BYPASS GRAFT  1989    chest, 1989    TUBAL LIGATION         CURRENT MEDICATIONS       Previous Medications    ALBUTEROL SULFATE  (90 BASE) MCG/ACT INHALER    Inhale 2 puffs into the lungs every 6 hours as needed for Wheezing    ASPIRIN 81 MG TABLET    Take 81 mg by mouth daily. ATORVASTATIN (LIPITOR) 40 MG TABLET    TAKE ONE TABLET BY MOUTH DAILY    AZITHROMYCIN (ZITHROMAX) 250 MG TABLET    Take 250 mg by mouth daily    CARVEDILOL (COREG) 6.25 MG TABLET    TAKE ONE TABLET BY MOUTH TWICE A DAY WITH MEALS    LISINOPRIL (PRINIVIL;ZESTRIL) 20 MG TABLET    Take 1 tablet by mouth daily    MOMETASONE-FORMOTEROL (DULERA) 200-5 MCG/ACT INHALER    Inhale 2 puffs into the lungs every 12 hours    MONTELUKAST (SINGULAIR) 10 MG TABLET    Take 10 mg by mouth nightly. MULTIPLE VITAMINS-MINERALS (THERAPEUTIC MULTIVITAMIN-MINERALS) TABLET    Take 1 tablet by mouth daily    NITROGLYCERIN (NITROSTAT) 0.4 MG SL TABLET    Place 1 tablet under the tongue every 5 minutes as needed. OSELTAMIVIR (TAMIFLU) 75 MG CAPSULE    Take 75 mg by mouth 2 times daily    TIOTROPIUM (SPIRIVA RESPIMAT) 2.5 MCG/ACT AERS INHALER    Inhale 2 puffs into the lungs daily       ALLERGIES     Darvon [propoxyphene hcl] and Minocin [minocycline hcl]    FAMILY HISTORY           Problem Relation Age of Onset    Heart Failure Mother     Emphysema Mother     Heart Disease Mother     Liver Disease Father     Cancer Father      Family Status   Relation Name Status    Mother      Father          SOCIAL HISTORY      reports that she quit smoking about 24 years ago. She has a 37.00 pack-year smoking history. She has never used smokeless tobacco. She reports that she drinks about 1.2 oz of alcohol per week. She reports that she does not use drugs. PHYSICAL EXAM    (up to 7 for level 4, 8 or more for level 5)     ED Triage Vitals [05/15/19 0938]   BP Temp Temp Source Pulse Resp SpO2 Height Weight   (!) 143/87 98 °F (36.7 °C) Oral 79 17 97 % 5' 5\" (1.651 m) 155 lb 13.8 oz (70.7 kg)       Constitutional:  Appearing well-developed and well-nourished. No distress. HENT:  Normocephalic and atraumatic. Conjunctivae and EOM are normal. Pupils are equal, round, and reactive to light. Neck:  Normal range of motion. Neck supple. No tracheal deviation present. No thyromegaly present. No cervical adenopathy. Cardiovascular:  Normal rate, regular rhythm, normal heart sounds and intact distal pulses. Pulmonary/Chest:  Effort normal and breath sounds normal. No respiratory distress. No wheezes or rales. Abdominal:  Soft. Bowel sounds are normal. No distension, mass, tenderness, rebound or guarding. Musculoskeletal:  Normal range of motion. No edema exhibited.    Neurological:  Alert and oriented to person, place, and time. No cranial nerve deficit. Skin:  Skin is warm and dry. Not diaphoretic. Psychiatric:  Normal mood, affect, behavior, judgment and thought content. DIAGNOSTIC RESULTS     RADIOLOGY:       Interpretation per the Radiologist below, if available at the time of this note:    XR CHEST PORTABLE   Final Result   Minimal left basilar atelectasis or pneumonitis. LABS:  Labs Reviewed   COMPREHENSIVE METABOLIC PANEL - Abnormal; Notable for the following components:       Result Value    Sodium 131 (*)     Chloride 95 (*)     Glucose 107 (*)     CREATININE <0.5 (*)     All other components within normal limits    Narrative:     Performed at:  [address] [place] [address]   Phone [phone]   CULTURE BLOOD #1   CULTURE BLOOD #2   CBC WITH AUTO DIFFERENTIAL    Narrative:     Performed at:  [address] [facility] [place] [address]   Phone [phone]   TROPONIN    Narrative:     Performed at:  [address] [place] [address] [place] [address]   Phone [phone]   LACTIC ACID, PLASMA    Narrative:     Performed at:  [address] [place] [address]   Phone [phone]       All other labs were within normal range or not returned as of this dictation. EMERGENCY DEPARTMENT COURSE and DIFFERENTIAL DIAGNOSIS/MDM:   Vitals:    Vitals:    05/15/19 0938 05/15/19 1027   BP: (!) 143/87    Pulse: 79    Resp: 17 16   Temp: 98 °F (36.7 °C)    TempSrc: Oral    SpO2: 97% 96%   Weight: 155 lb 13.8 oz (70.7 kg)    Height: 5' 5\" (1.651 m)        The patient's condition in the ED was good, the patient was afebrile and nontoxic in appearance, and the patient's physical exam was unremarkable. EKG showed no concerning findings.   Chest x-ray was interpreted by the radiologist as showing minimal left basilar atelectasis or pneumonitis, but comparison with patient's previous chest x-ray along with the patient's reported symptoms raises the suspicion for pneumonia. Laboratory workup showed some mild hyponatremia but otherwise no concerning abnormalities. Patient was given IV fluids in the ED. She would benefit from hospital admission for further treatment. Dr. Lo Busch consulted the patient's family physician, who agreed to admit the patient. ED attending physician Dr. Lo Busch evaluated the patient personally and agrees with this plan of care. PROCEDURES:  None    FINAL IMPRESSION      1. Pneumonia due to organism    2. Influenza    3.  Non-intractable vomiting with nausea, unspecified vomiting type          DISPOSITION/PLAN   DISPOSITION Admitted 05/15/2019 12:47:23 PM      PATIENT REFERRED TO:  Kina Giron MD  [address]            DISCHARGE MEDICATIONS:  New Prescriptions    No medications on file       (Please note that portions of this note were completed with a voice recognition program.  Efforts were made to edit the dictations but occasionally words are mis-transcribed.)    Diego Robledo[address]  05/15/19 3357

## 2019-05-15 NOTE — ED NOTES
Pharmacy Medication Reconciliation Note     List of medications patient is currently taking is complete. Allergies:  Darvon [propoxyphene hcl] and Minocin [minocycline hcl]    Source of information:   EMR   2. Patient  3. Pill bottles    Notes regarding home medications:   1. Patient reports only taking her Tamiflu today. She started yesterday and has taken 2 doses, but has been getting sick  2. Also started Lurlene Kale recently - has 2 doses left. No dose yet today. 3. Would like to use her own Spiriva Respimat inhaler if possible. Has it with her. 4. Needs all of her maintenance meds today.      Denies taking any other OTC or herbal medications    Kt Proctor PharmD, BCPS  5/15/2019  1:19 PM

## 2019-05-15 NOTE — ED PROVIDER NOTES
[facility]  eMERGENCY dEPARTMENT eNCOUnter   Physician Attestation    Pt Name: Salomon Morales  MRN: [id number]  Marilyngfcam 1945  Date of evaluation: 5/15/19        Physician Note:    I havepersonally performed and/or participated in the history, exam and medical decision making and agree with all pertinent clinical information. I have also reviewed and agree with the past medical, family and social historyunless otherwise noted. I have personally performed a face to face diagnostic evaluation onthis patient. I have reviewed the mid-levels findings and agree. History: Flu Like Sx's since Saturday. Cough and congestion. On Tamiflu. States tested positive for influenza. Now vomiting. Hx of CAD and COPD. She is also on Zithromax at this time. Physical Exam   Constitutional: She is oriented to person, place, and time. She appears well-developed and well-nourished. HENT:   Head: Normocephalic and atraumatic. Right Ear: External ear normal.   Left Ear: External ear normal.   Eyes: Conjunctivae are normal. Right eye exhibits no discharge. Left eye exhibits no discharge. No scleral icterus. Neck: Normal range of motion. No tracheal deviation present. Pulmonary/Chest: Effort normal. No stridor. No respiratory distress. She has decreased breath sounds in the right lower field and the left lower field. She has wheezes in the right lower field and the left lower field. She has rhonchi in the right lower field and the left lower field. Musculoskeletal: Normal range of motion. Neurological: She is alert and oriented to person, place, and time. Coordination normal.   Skin: Skin is warm and dry. She is not diaphoretic. Psychiatric: She has a normal mood and affect. Her behavior is normal.   Nursing note and vitals reviewed. EKG visualized preliminarily interpreted by myself shows sinus rhythm. Rate is 70 with an axis of 16. There is a left bundle branch block which is old.

## 2019-05-16 LAB
ANION GAP SERPL CALCULATED.3IONS-SCNC: 10 MMOL/L (ref 3–16)
BASOPHILS ABSOLUTE: 0 K/UL (ref 0–0.2)
BASOPHILS RELATIVE PERCENT: 0 %
BUN BLDV-MCNC: 6 MG/DL (ref 7–20)
CALCIUM SERPL-MCNC: 8.5 MG/DL (ref 8.3–10.6)
CHLORIDE BLD-SCNC: 98 MMOL/L (ref 99–110)
CO2: 24 MMOL/L (ref 21–32)
CREAT SERPL-MCNC: 0.6 MG/DL (ref 0.6–1.2)
EKG ATRIAL RATE: 70 BPM
EKG DIAGNOSIS: NORMAL
EKG P AXIS: -8 DEGREES
EKG P-R INTERVAL: 142 MS
EKG Q-T INTERVAL: 452 MS
EKG QRS DURATION: 128 MS
EKG QTC CALCULATION (BAZETT): 488 MS
EKG R AXIS: 16 DEGREES
EKG T AXIS: 58 DEGREES
EKG VENTRICULAR RATE: 70 BPM
EOSINOPHILS ABSOLUTE: 0 K/UL (ref 0–0.6)
EOSINOPHILS RELATIVE PERCENT: 1 %
GFR AFRICAN AMERICAN: >60
GFR NON-AFRICAN AMERICAN: >60
GLUCOSE BLD-MCNC: 90 MG/DL (ref 70–99)
HCT VFR BLD CALC: 35 % (ref 36–48)
HEMATOLOGY PATH CONSULT: YES
HEMOGLOBIN: 12.3 G/DL (ref 12–16)
LYMPHOCYTES ABSOLUTE: 1.4 K/UL (ref 1–5.1)
LYMPHOCYTES RELATIVE PERCENT: 52 %
MCH RBC QN AUTO: 31.2 PG (ref 26–34)
MCHC RBC AUTO-ENTMCNC: 35.2 G/DL (ref 31–36)
MCV RBC AUTO: 88.7 FL (ref 80–100)
MONOCYTES ABSOLUTE: 0.3 K/UL (ref 0–1.3)
MONOCYTES RELATIVE PERCENT: 11 %
NEUTROPHILS ABSOLUTE: 0.9 K/UL (ref 1.7–7.7)
NEUTROPHILS RELATIVE PERCENT: 36 %
OVALOCYTES: ABNORMAL
PDW BLD-RTO: 13 % (ref 12.4–15.4)
PLATELET # BLD: 189 K/UL (ref 135–450)
PMV BLD AUTO: 6.2 FL (ref 5–10.5)
POIKILOCYTES: ABNORMAL
POTASSIUM REFLEX MAGNESIUM: 3.9 MMOL/L (ref 3.5–5.1)
RBC # BLD: 3.95 M/UL (ref 4–5.2)
REPORT: NORMAL
SODIUM BLD-SCNC: 132 MMOL/L (ref 136–145)
WBC # BLD: 2.6 K/UL (ref 4–11)

## 2019-05-16 PROCEDURE — 36415 COLL VENOUS BLD VENIPUNCTURE: CPT

## 2019-05-16 PROCEDURE — 94640 AIRWAY INHALATION TREATMENT: CPT

## 2019-05-16 PROCEDURE — 6370000000 HC RX 637 (ALT 250 FOR IP): Performed by: INTERNAL MEDICINE

## 2019-05-16 PROCEDURE — 80048 BASIC METABOLIC PNL TOTAL CA: CPT

## 2019-05-16 PROCEDURE — 87449 NOS EACH ORGANISM AG IA: CPT

## 2019-05-16 PROCEDURE — 1200000000 HC SEMI PRIVATE

## 2019-05-16 PROCEDURE — 99223 1ST HOSP IP/OBS HIGH 75: CPT | Performed by: INTERNAL MEDICINE

## 2019-05-16 PROCEDURE — 85025 COMPLETE CBC W/AUTO DIFF WBC: CPT

## 2019-05-16 PROCEDURE — 2580000003 HC RX 258: Performed by: INTERNAL MEDICINE

## 2019-05-16 PROCEDURE — 6360000002 HC RX W HCPCS: Performed by: INTERNAL MEDICINE

## 2019-05-16 PROCEDURE — 94760 N-INVAS EAR/PLS OXIMETRY 1: CPT

## 2019-05-16 RX ORDER — ASPIRIN 81 MG/1
81 TABLET, CHEWABLE ORAL DAILY
Status: DISCONTINUED | OUTPATIENT
Start: 2019-05-16 | End: 2019-05-19 | Stop reason: HOSPADM

## 2019-05-16 RX ORDER — GUAIFENESIN/DEXTROMETHORPHAN 100-10MG/5
5 SYRUP ORAL EVERY 4 HOURS PRN
Status: DISCONTINUED | OUTPATIENT
Start: 2019-05-16 | End: 2019-05-19 | Stop reason: HOSPADM

## 2019-05-16 RX ADMIN — OSELTAMIVIR PHOSPHATE 75 MG: 75 CAPSULE ORAL at 08:21

## 2019-05-16 RX ADMIN — SODIUM CHLORIDE: 9 INJECTION, SOLUTION INTRAVENOUS at 06:47

## 2019-05-16 RX ADMIN — ENOXAPARIN SODIUM 40 MG: 40 INJECTION SUBCUTANEOUS at 08:19

## 2019-05-16 RX ADMIN — CEFTRIAXONE 1 G: 1 INJECTION, POWDER, FOR SOLUTION INTRAMUSCULAR; INTRAVENOUS at 20:08

## 2019-05-16 RX ADMIN — GUAIFENESIN AND DEXTROMETHORPHAN 5 ML: 100; 10 SYRUP ORAL at 11:39

## 2019-05-16 RX ADMIN — ATORVASTATIN CALCIUM 40 MG: 40 TABLET, FILM COATED ORAL at 08:21

## 2019-05-16 RX ADMIN — MONTELUKAST SODIUM 10 MG: 10 TABLET, FILM COATED ORAL at 20:10

## 2019-05-16 RX ADMIN — GUAIFENESIN AND DEXTROMETHORPHAN 5 ML: 100; 10 SYRUP ORAL at 21:19

## 2019-05-16 RX ADMIN — FAMOTIDINE 20 MG: 20 TABLET ORAL at 08:21

## 2019-05-16 RX ADMIN — OSELTAMIVIR PHOSPHATE 75 MG: 75 CAPSULE ORAL at 20:10

## 2019-05-16 RX ADMIN — MOMETASONE FUROATE AND FORMOTEROL FUMARATE DIHYDRATE 2 PUFF: 200; 5 AEROSOL RESPIRATORY (INHALATION) at 08:37

## 2019-05-16 RX ADMIN — CARVEDILOL 6.25 MG: 6.25 TABLET, FILM COATED ORAL at 17:07

## 2019-05-16 RX ADMIN — LISINOPRIL 20 MG: 20 TABLET ORAL at 08:21

## 2019-05-16 RX ADMIN — ASPIRIN 81 MG 81 MG: 81 TABLET ORAL at 11:39

## 2019-05-16 RX ADMIN — CARVEDILOL 6.25 MG: 6.25 TABLET, FILM COATED ORAL at 08:20

## 2019-05-16 RX ADMIN — MOMETASONE FUROATE AND FORMOTEROL FUMARATE DIHYDRATE 2 PUFF: 200; 5 AEROSOL RESPIRATORY (INHALATION) at 21:07

## 2019-05-16 RX ADMIN — FAMOTIDINE 20 MG: 20 TABLET ORAL at 20:10

## 2019-05-16 RX ADMIN — SODIUM CHLORIDE: 9 INJECTION, SOLUTION INTRAVENOUS at 17:07

## 2019-05-16 RX ADMIN — AZITHROMYCIN MONOHYDRATE 500 MG: 500 INJECTION, POWDER, LYOPHILIZED, FOR SOLUTION INTRAVENOUS at 20:09

## 2019-05-16 ASSESSMENT — PAIN SCALES - GENERAL
PAINLEVEL_OUTOF10: 0
PAINLEVEL_OUTOF10: 0

## 2019-05-16 NOTE — CONSULTS
REASON FOR CONSULTATION/CC: pneumonia, +influenza      Consult at request of Christen Post MD     PCP: Christen Post MD    Chief Complaint   Patient presents with    Influenza     dx yesterday with flu has been taking tamiflu since yesterday     Emesis     both days        HISTORY OF PRESENT ILLNESS: Jennifer Alvarenga is a 68y.o. year old female with a history of COPD who presents with 5 days of worseneing sob, nausea/vomiting and malaise. Pt states she started feeling ill on Sunday when she woke up. She had a refill for azithromycin which she filled and started taking. However, sx progressed and pt unable to maintain PO intake. She sought attention at Urgent care and diagnosed with Influenza A on nasal swab. She was started on tamiflu but unable to take pills due to vomiting. Since admission pt states her symptoms have somewhat improved. CXR on arrival showed small L base pna. Past Medical History:   Diagnosis Date    CAD (coronary artery disease)     Cardiomyopathy ([facility] [address]     Emphysema     Hyperlipidemia     Hypertension     Myocardial infarct [facility])          Past Surgical History:   Procedure Laterality Date    APPENDECTOMY      BREAST BIOPSY      CARDIAC SURGERY  2009    RCA    CHOLECYSTECTOMY      COLONOSCOPY      HYSTEROSCOPY  09/09/2016    HYSTEROSCOPY DILATATION AND CURETTAGE WITH MYOSURE    SUBCLAVIAN BYPASS GRAFT  1989    chest, 1989    TUBAL LIGATION         Family Hx  family history includes Cancer in her father; Emphysema in her mother; Heart Disease in her mother; Heart Failure in her mother; Liver Disease in her father. Social Hx   reports that she quit smoking about 24 years ago. She has a 37.00 pack-year smoking history.  She has never used smokeless tobacco.    Scheduled Meds:   atorvastatin  40 mg Oral Daily    carvedilol  6.25 mg Oral BID WC    lisinopril  20 mg Oral Daily    mometasone-formoterol  2 puff Inhalation BID    montelukast  10 mg Oral insight appropriate. Mood stable. Data Reviewed:   LABS:  CBC:   Recent Labs     05/15/19  0945 05/16/19  0623   WBC 4.3 2.6*   HGB 14.7 12.3   HCT 41.9 35.0*   MCV 88.0 88.7    189     BMP:   Recent Labs     05/15/19  0945 05/16/19  0623   * 132*   K 4.0 3.9   CL 95* 98*   CO2 23 24   BUN 9 6*   CREATININE <0.5* 0.6     LIVER PROFILE:   Recent Labs     05/15/19  0945   AST 27   ALT 23   BILITOT 0.6   ALKPHOS 84     PT/INR: No results for input(s): PROTIME, INR in the last 72 hours. APTT: No results for input(s): APTT in the last 72 hours. UA:No results for input(s): NITRITE, COLORU, PHUR, LABCAST, WBCUA, RBCUA, MUCUS, TRICHOMONAS, YEAST, BACTERIA, CLARITYU, SPECGRAV, LEUKOCYTESUR, UROBILINOGEN, BILIRUBINUR, BLOODU, GLUCOSEU, AMORPHOUS in the last 72 hours. Invalid input(s): KETONESU  No results for input(s): PHART, [id number], PO2ART in the last 72 hours. Radiology Review:  Pertinent images / reports were reviewed as a part of this visit. CXR imaging reviewed personally by me, Interpretation:  Small Left basilar infiltrate    CT Chest w/o contrast:   Results for orders placed during the hospital encounter of 05/14/18   CT CHEST WO CONTRAST    Narrative EXAMINATION:  CT OF THE CHEST WITHOUT CONTRAST 5/14/2018 9:48 am    TECHNIQUE:  CT of the chest was performed without the administration of intravenous  contrast. Multiplanar reformatted images are provided for review. Dose  modulation, iterative reconstruction, and/or weight based adjustment of the  mA/kV was utilized to reduce the radiation dose to as low as reasonably  achievable.     COMPARISON:  01/25/2017    HISTORY:  ORDERING PHYSICIAN PROVIDED HISTORY: Shortness of breath  TECHNOLOGIST PROVIDED HISTORY:  Technologist Provided Reason for Exam: lll pain, abd. cxr  Acuity: Acute  Type of Encounter: Subsequent/Follow-up  Relevant Medical/Surgical History: no surg, or hx. ca    FINDINGS:  Mediastinum: Thyroid gland appears normal. Atherosclerotic change is seen in  the aorta. Coronary artery calcification is seen. Small hiatal hernia is  seen. There is nonspecific thickening at the GE junction. .  Rim Calcified  aneurysm at the thoracic inlet is unchanged posteriorly on the right,  measuring 1.8 cm    Lungs/pleura: Linear and curvilinear opacities are seen in the apices. There  is underlying emphysema. No pneumonia. No edema. No pleural effusions    Calcified granuloma is seen in the right lower lobe. Punctate nodule right lower lobe is unchanged measuring 2-3 mm. Punctate  nodule left upper lobe is also unchanged. .  Tiny punctate nodule right  upper lobe is also unchanged    Trace pleural fluid is seen on the right.  , incidentally noted    Upper Abdomen: Mild clips are seen from prior cholecystectomy. Scattered  hypodense nodules are seen throughout the liver, measuring 1.8 cm in size or  less. , likely cyst.  Mild biliary ductal dilatation is unchanged    Soft Tissues/Bones: Axillary bypass graft is seen. Spurring is seen in the spine      Impression Mild emphysema with a few tiny punctate nodules, similar to prior. No focal  pneumonia. No pulmonary edema         CTPA: No results found for this or any previous visit. CXR PA/LAT:   Results for orders placed during the hospital encounter of 12/29/17   XR CHEST STANDARD (2 VW)    Narrative EXAMINATION:  TWO VIEWS OF THE CHEST    12/29/2017 9:19 am    COMPARISON:  CT chest January 25, 2017 and chest radiograph April 19, 2013. HISTORY:  ORDERING PHYSICIAN PROVIDED HISTORY: Cough  TECHNOLOGIST PROVIDED HISTORY:  Technologist Provided Reason for Exam: cough and flu like symptoms x 4 days  Acuity: Acute  Type of Encounter: Initial    FINDINGS:  The lungs are without acute focal process. A few small calcified granulomas  are again seen within the left lung. There is no effusion or pneumothorax. The cardiomediastinal silhouette is without acute process.  The osseous  structures are without acute process. Surgical clips are seen overlying both  upper lungs, similar to prior. No significant change compared to prior. Impression No acute process. CXR portable:   Results for orders placed during the hospital encounter of 05/15/19   XR CHEST PORTABLE    Narrative EXAMINATION:  ONE XRAY VIEW OF THE CHEST    5/15/2019 10:10 am    COMPARISON:  12/29/2017    HISTORY:  ORDERING SYSTEM PROVIDED HISTORY: chest tightness  TECHNOLOGIST PROVIDED HISTORY:  Reason for exam:->chest tightness  Ordering Physician Provided Reason for Exam: Influenza (dx yesterday with flu  has been taking tamiflu since yesterday )  Acuity: Acute  Type of Exam: Ongoing    FINDINGS:  Cardiac leads project over the chest.  Hazy opacity seen in the left base  with decreased definition of the lateral left hemidiaphragm. No gross  pneumothorax. Cardiac and mediastinal silhouettes are similar to prior. Right upper quadrant clips. Rim calcification is seen at the soft tissues of  the left lateral chest wall. Clips are seen projecting over the left apex. Impression Minimal left basilar atelectasis or pneumonitis. Pulmonary function testing 04/2019 interpretation reviewed  Moderate COPD, with air trapping and reduced DLCO    ECHO 1/2017  Summary   Normal left ventricular size and wall thickness.   Low limits of normal left ventricular systolic function with abnormal septal   motion related to IVCD. Estimated EF 50%.   E/e'=21.   Trivial mitral regurgitation.  Abel Laundry is an aneurysmal interatrial septum with no evidence of shunting.   Mild tricuspid regurgitation with RVSP estimated at 27 mmHg.     Assessment:     Influenza  Community Acquired Pneumonia  Moderate COPD  Vomiting with Nausea    Plan:      -cont tamiflu x 5 days  -agree with empiric CAP abx x 7 days, cannot r/o aspiration in setting of vomiting but respiratory status not compromised.  -can convert abx to PO levaquin or augmentin to finish 7 day course.  -So barajas singulair    Future Appointments   Date Time Provider Juan Miguel Leo   7/8/2019  8:20 AM Magnolia Mortimer, MD CARMEN PULM [facility]   10/2/2019 10:00 AM Maxine Coles MD FF Cardio [facility]   11/18/2019 11:00 AM Ratna Quesada MD PULM & CC [facility]          This note was transcribed using [id number]. Please disregard any translational errors.     Thank you for the consult    [address]  Pulmonary, Sleep and Critical Care Medicine

## 2019-05-16 NOTE — CARE COORDINATION
INITIAL CASE MANAGEMENT ASSESSMENT    Reviewed chart, met with patient to assess possible discharge needs. Explained Case Management role/services. Living Situation: Patient lives with her  and daughter in a condo with [address]. ADLs: independent     DME: none    PT/OT Recs: not ordered at this time     Active Services: none     Transportation: Active /family will transport to home     Medications: [employer]/affordable    PCP: Dr. Tomasa Jefferson      HD/PD: n/a    PLAN/COMMENTS: Patient plans to return to homewith family support    SW/CM provided contact information for patient or family to call with any questions. SW/CM will follow and assist as needed. Electronically signed by Chloe Sandra RN on 5/16/2019 at 2:33 PM

## 2019-05-16 NOTE — PROGRESS NOTES
Lab notified nurse of critical [address]: 0.9. Patient is placed in Neutropenic precaution. Tiki served  Dr. Dulce Maria Langston.

## 2019-05-16 NOTE — H&P
[address] [place] [address]                              HISTORY AND PHYSICAL    PATIENT NAME: Negra Lopez               :        1945  MED REC NO:   [id number]                          ROOM:       4004  ACCOUNT NO:   [de-identified]                           ADMIT DATE: 05/15/2019  PROVIDER:     Apolinar Monet MD    CHIEF COMPLAINT:  \"I don't feel good.

## 2019-05-16 NOTE — PLAN OF CARE
Problem: Risk for Impaired Skin Integrity  Goal: Tissue integrity - skin and mucous membranes  Description  Structural intactness and normal physiological function of skin and  mucous membranes. Outcome: Ongoing     Problem:  Activity:  Goal: Energy level will increase  Description  Energy level will increase  Outcome: Ongoing  Goal: Ability to return to normal activity level will improve  Description  Ability to return to normal activity level will improve  Outcome: Ongoing     Problem: Fluid Volume:  Goal: Maintenance of adequate hydration will improve  Description  Maintenance of adequate hydration will improve  Outcome: Ongoing     Problem: Health Behavior:  Goal: Adoption of positive health habits will improve  Description  Adoption of positive health habits will improve  Outcome: Ongoing  Goal: Compliance with immunization standards will improve  Description  Compliance with immunization standards will improve  Outcome: Ongoing     Problem: Physical Regulation:  Goal: Complications related to the disease process, condition or treatment will be avoided or minimized  Description  Complications related to the disease process, condition or treatment will be avoided or minimized  Outcome: Ongoing  Goal: Ability to maintain clinical measurements within normal limits will improve  Description  Ability to maintain clinical measurements within normal limits will improve  Outcome: Ongoing  Goal: Signs and symptoms of infection will decrease  Description  Signs and symptoms of infection will decrease  Outcome: Ongoing     Problem: Respiratory:  Goal: Respiratory status will improve  Description  Respiratory status will improve  Outcome: Ongoing     Problem: Sensory:  Goal: General experience of comfort will improve  Description  General experience of comfort will improve  Outcome: Ongoing

## 2019-05-17 ENCOUNTER — APPOINTMENT (OUTPATIENT)
Dept: GENERAL RADIOLOGY | Age: 74
DRG: 195 | End: 2019-05-17
Payer: MEDICARE

## 2019-05-17 LAB
ALBUMIN SERPL-MCNC: 3.3 G/DL (ref 3.4–5)
ANION GAP SERPL CALCULATED.3IONS-SCNC: 9 MMOL/L (ref 3–16)
BASOPHILS ABSOLUTE: 0 K/UL (ref 0–0.2)
BASOPHILS RELATIVE PERCENT: 0.6 %
BUN BLDV-MCNC: 5 MG/DL (ref 7–20)
CALCIUM SERPL-MCNC: 8.5 MG/DL (ref 8.3–10.6)
CHLORIDE BLD-SCNC: 101 MMOL/L (ref 99–110)
CO2: 24 MMOL/L (ref 21–32)
CREAT SERPL-MCNC: <0.5 MG/DL (ref 0.6–1.2)
EOSINOPHILS ABSOLUTE: 0 K/UL (ref 0–0.6)
EOSINOPHILS RELATIVE PERCENT: 0.1 %
GFR AFRICAN AMERICAN: >60
GFR NON-AFRICAN AMERICAN: >60
GLUCOSE BLD-MCNC: 106 MG/DL (ref 70–99)
HCT VFR BLD CALC: 35.7 % (ref 36–48)
HEMATOLOGY PATH CONSULT: NORMAL
HEMOGLOBIN: 12.5 G/DL (ref 12–16)
L. PNEUMOPHILA SEROGP 1 UR AG: NORMAL
LYMPHOCYTES ABSOLUTE: 1.5 K/UL (ref 1–5.1)
LYMPHOCYTES RELATIVE PERCENT: 44.8 %
MCH RBC QN AUTO: 31 PG (ref 26–34)
MCHC RBC AUTO-ENTMCNC: 35.1 G/DL (ref 31–36)
MCV RBC AUTO: 88.4 FL (ref 80–100)
MONOCYTES ABSOLUTE: 0.4 K/UL (ref 0–1.3)
MONOCYTES RELATIVE PERCENT: 10.7 %
NEUTROPHILS ABSOLUTE: 1.5 K/UL (ref 1.7–7.7)
NEUTROPHILS RELATIVE PERCENT: 43.8 %
PDW BLD-RTO: 12.6 % (ref 12.4–15.4)
PHOSPHORUS: 2.5 MG/DL (ref 2.5–4.9)
PLATELET # BLD: 194 K/UL (ref 135–450)
PMV BLD AUTO: 6.1 FL (ref 5–10.5)
POTASSIUM SERPL-SCNC: 3.7 MMOL/L (ref 3.5–5.1)
RBC # BLD: 4.04 M/UL (ref 4–5.2)
SODIUM BLD-SCNC: 134 MMOL/L (ref 136–145)
STREP PNEUMONIAE ANTIGEN, URINE: NORMAL
WBC # BLD: 3.3 K/UL (ref 4–11)

## 2019-05-17 PROCEDURE — 99232 SBSQ HOSP IP/OBS MODERATE 35: CPT | Performed by: INTERNAL MEDICINE

## 2019-05-17 PROCEDURE — 71046 X-RAY EXAM CHEST 2 VIEWS: CPT

## 2019-05-17 PROCEDURE — 2580000003 HC RX 258: Performed by: INTERNAL MEDICINE

## 2019-05-17 PROCEDURE — 6370000000 HC RX 637 (ALT 250 FOR IP): Performed by: INTERNAL MEDICINE

## 2019-05-17 PROCEDURE — 80069 RENAL FUNCTION PANEL: CPT

## 2019-05-17 PROCEDURE — 94640 AIRWAY INHALATION TREATMENT: CPT

## 2019-05-17 PROCEDURE — 85025 COMPLETE CBC W/AUTO DIFF WBC: CPT

## 2019-05-17 PROCEDURE — 36415 COLL VENOUS BLD VENIPUNCTURE: CPT

## 2019-05-17 PROCEDURE — 1200000000 HC SEMI PRIVATE

## 2019-05-17 PROCEDURE — 6360000002 HC RX W HCPCS: Performed by: INTERNAL MEDICINE

## 2019-05-17 PROCEDURE — 94760 N-INVAS EAR/PLS OXIMETRY 1: CPT

## 2019-05-17 PROCEDURE — 94664 DEMO&/EVAL PT USE INHALER: CPT

## 2019-05-17 RX ORDER — AMLODIPINE BESYLATE 5 MG/1
2.5 TABLET ORAL 2 TIMES DAILY PRN
Status: DISCONTINUED | OUTPATIENT
Start: 2019-05-17 | End: 2019-05-19 | Stop reason: HOSPADM

## 2019-05-17 RX ORDER — MAGNESIUM HYDROXIDE/ALUMINUM HYDROXICE/SIMETHICONE 120; 1200; 1200 MG/30ML; MG/30ML; MG/30ML
30 SUSPENSION ORAL EVERY 6 HOURS PRN
Status: DISCONTINUED | OUTPATIENT
Start: 2019-05-17 | End: 2019-05-19 | Stop reason: HOSPADM

## 2019-05-17 RX ADMIN — OSELTAMIVIR PHOSPHATE 75 MG: 75 CAPSULE ORAL at 10:00

## 2019-05-17 RX ADMIN — MOMETASONE FUROATE AND FORMOTEROL FUMARATE DIHYDRATE 2 PUFF: 200; 5 AEROSOL RESPIRATORY (INHALATION) at 20:24

## 2019-05-17 RX ADMIN — MOMETASONE FUROATE AND FORMOTEROL FUMARATE DIHYDRATE 2 PUFF: 200; 5 AEROSOL RESPIRATORY (INHALATION) at 08:50

## 2019-05-17 RX ADMIN — ATORVASTATIN CALCIUM 40 MG: 40 TABLET, FILM COATED ORAL at 10:14

## 2019-05-17 RX ADMIN — AZITHROMYCIN MONOHYDRATE 500 MG: 500 INJECTION, POWDER, LYOPHILIZED, FOR SOLUTION INTRAVENOUS at 20:24

## 2019-05-17 RX ADMIN — ASPIRIN 81 MG 81 MG: 81 TABLET ORAL at 10:13

## 2019-05-17 RX ADMIN — OSELTAMIVIR PHOSPHATE 75 MG: 75 CAPSULE ORAL at 20:24

## 2019-05-17 RX ADMIN — MONTELUKAST SODIUM 10 MG: 10 TABLET, FILM COATED ORAL at 20:24

## 2019-05-17 RX ADMIN — CARVEDILOL 6.25 MG: 6.25 TABLET, FILM COATED ORAL at 17:04

## 2019-05-17 RX ADMIN — CARVEDILOL 6.25 MG: 6.25 TABLET, FILM COATED ORAL at 08:52

## 2019-05-17 RX ADMIN — FAMOTIDINE 20 MG: 20 TABLET ORAL at 20:24

## 2019-05-17 RX ADMIN — ENOXAPARIN SODIUM 40 MG: 40 INJECTION SUBCUTANEOUS at 10:14

## 2019-05-17 RX ADMIN — FAMOTIDINE 20 MG: 20 TABLET ORAL at 10:14

## 2019-05-17 RX ADMIN — SODIUM CHLORIDE: 9 INJECTION, SOLUTION INTRAVENOUS at 04:40

## 2019-05-17 RX ADMIN — SODIUM CHLORIDE: 9 INJECTION, SOLUTION INTRAVENOUS at 17:04

## 2019-05-17 RX ADMIN — CEFTRIAXONE 1 G: 1 INJECTION, POWDER, FOR SOLUTION INTRAMUSCULAR; INTRAVENOUS at 20:25

## 2019-05-17 RX ADMIN — LISINOPRIL 20 MG: 20 TABLET ORAL at 10:13

## 2019-05-17 RX ADMIN — GUAIFENESIN AND DEXTROMETHORPHAN 5 ML: 100; 10 SYRUP ORAL at 22:04

## 2019-05-17 ASSESSMENT — PAIN SCALES - GENERAL
PAINLEVEL_OUTOF10: 0
PAINLEVEL_OUTOF10: 0

## 2019-05-17 NOTE — PLAN OF CARE
Problem: Risk for Impaired Skin Integrity  Goal: Tissue integrity - skin and mucous membranes  Description  Structural intactness and normal physiological function of skin and  mucous membranes. 5/17/2019 0109 by Tatiana Tamayo RN  Outcome: Ongoing  5/17/2019 0109 by Tatiana Tamayo RN  Outcome: Ongoing  5/16/2019 1608 by Ever Blevins RN  Outcome: Ongoing     Problem:  Activity:  Goal: Energy level will increase  Description  Energy level will increase  5/17/2019 0109 by Tatiana Tamayo RN  Outcome: Ongoing  5/17/2019 0109 by Tatiana Tamayo RN  Outcome: Ongoing  5/16/2019 1608 by Ever Blevins RN  Outcome: Ongoing  Goal: Ability to return to normal activity level will improve  Description  Ability to return to normal activity level will improve  5/17/2019 0109 by Tatiana Tamayo RN  Outcome: Ongoing  5/17/2019 0109 by Tatiana Tamayo RN  Outcome: Ongoing  5/16/2019 1608 by Ever Blevins RN  Outcome: Ongoing     Problem: Fluid Volume:  Goal: Maintenance of adequate hydration will improve  Description  Maintenance of adequate hydration will improve  5/17/2019 0109 by Tatiana Tamayo RN  Outcome: Ongoing  5/17/2019 0109 by Tatiana Tamayo RN  Outcome: Ongoing  5/16/2019 1608 by Ever Blevins RN  Outcome: Ongoing

## 2019-05-17 NOTE — PROGRESS NOTES
Pulmonary Progress Note    Date of Admission: 5/15/2019   LOS: 2 days     Chief Complaint   Patient presents with    Influenza     dx yesterday with flu has been taking tamiflu since yesterday     Emesis     both days        Assessment:     Influenza A  Community Acquired PNA, Left lower lobe  Moderate COPD    Plan:      -Tamiflu for 5 days  -abx for 7 days to complete course for CAP, can be completed as outpatient with PO levaquin or augmentin  -duonebs, Dulera, spiriva, singulair      HPI/Subjective  No events o/n. Tolerating RA. Says she feels better than yesterday but can't really provide specifically what feels better or how she feels poorly. From pulmonary standpoint okay for discharge, I have arranged Outpatient follow up. We will sign off at this time. Future Appointments   Date Time Provider Juan Miguel Leo   7/8/2019  8:20 AM Jose Jensen MD CAR[facility] PULM [facility]   10/2/2019 10:00 AM Kwasi Sandoval MD FF Cardio [facility]   11/18/2019 11:00 AM Rolf Rivera MD PULM & CC [facility]       ROS:   No nausea  No Vomiting  No chest pain    No intake or output data in the 24 hours ending 05/17/19 0826      PHYSICAL EXAM:   Blood pressure (!) 172/78, pulse 65, temperature 98.4 °F (36.9 °C), temperature source Oral, resp. rate 16, height 5' 5\" (1.651 m), weight 160 lb 0.9 oz (72.6 kg), SpO2 93 %, not currently breastfeeding.'  Gen:  No acute distress. Eyes: PERRL. Anicteric sclera. No conjunctival injection. ENT: No discharge. Posterior oropharynx clear. External appearance of ears and nose normal.  Neck: Trachea midline. No mass   Resp:  No crackles. No wheezes. No rhonchi. No dullness on percussion. CV: Regular rate. Regular rhythm. No murmur or rub. No edema. GI: Soft, Non-tender. Non-distended. +BS  Skin: Warm, dry, w/o erythema. Lymph: No cervical or supraclavicular LAD. M/S: No cyanosis. No clubbing. Neuro:   no focal neurologic deficit. Moves all extremities  Psych: Awake and alert, Oriented x 3. Judgement and insight appropriate. Mood stable. Medications:    Scheduled Meds:   aspirin  81 mg Oral Daily    atorvastatin  40 mg Oral Daily    carvedilol  6.25 mg Oral BID WC    lisinopril  20 mg Oral Daily    mometasone-formoterol  2 puff Inhalation BID    montelukast  10 mg Oral Nightly    tiotropium  2 puff Inhalation Daily    sodium chloride flush  10 mL Intravenous 2 times per day    enoxaparin  40 mg Subcutaneous Daily    famotidine  20 mg Oral BID    azithromycin  500 mg Intravenous Q24H    And    cefTRIAXone (ROCEPHIN) IV  1 g Intravenous Q24H    oseltamivir  75 mg Oral BID       Continuous Infusions:   sodium chloride 100 mL/hr at 05/17/19 0440       PRN Meds:  guaiFENesin-dextromethorphan, albuterol sulfate HFA, nitroGLYCERIN, sodium chloride flush, magnesium hydroxide, ondansetron, albuterol, acetaminophen, LORazepam    Labs reviewed:  CBC:   Recent Labs     05/15/19  0945 05/16/19  0623 05/17/19  0702   WBC 4.3 2.6* 3.3*   HGB 14.7 12.3 12.5   HCT 41.9 35.0* 35.7*   MCV 88.0 88.7 88.4    189 194     BMP:   Recent Labs     05/15/19  0945 05/16/19  0623 05/17/19  0702   * 132* 134*   K 4.0 3.9 3.7   CL 95* 98* 101   CO2 23 24 24   PHOS  --   --  2.5   BUN 9 6* 5*   CREATININE <0.5* 0.6 <0.5*     LIVER PROFILE:   Recent Labs     05/15/19  0945   AST 27   ALT 23   BILITOT 0.6   ALKPHOS 84     PT/INR: No results for input(s): PROTIME, INR in the last 72 hours. APTT: No results for input(s): APTT in the last 72 hours. UA:No results for input(s): NITRITE, COLORU, PHUR, LABCAST, WBCUA, RBCUA, MUCUS, TRICHOMONAS, YEAST, BACTERIA, CLARITYU, SPECGRAV, LEUKOCYTESUR, UROBILINOGEN, BILIRUBINUR, BLOODU, GLUCOSEU, AMORPHOUS in the last 72 hours. Invalid input(s): Forrestine Oddi  No results for input(s): PH, PCO2, PO2 in the last 72 hours. Films:  Radiology Review:  Pertinent images / reports were reviewed as a part of this visit.     CT Chest w/ contrast: No results found for this Impression Mild emphysema with a few tiny punctate nodules, similar to prior. No focal  pneumonia. No pulmonary edema         CTPA: No results found for this or any previous visit. CXR PA/LAT:   Results for orders placed during the hospital encounter of 12/29/17   XR CHEST STANDARD (2 VW)    Narrative EXAMINATION:  TWO VIEWS OF THE CHEST    12/29/2017 9:19 am    COMPARISON:  CT chest January 25, 2017 and chest radiograph April 19, 2013. HISTORY:  ORDERING PHYSICIAN PROVIDED HISTORY: Cough  TECHNOLOGIST PROVIDED HISTORY:  Technologist Provided Reason for Exam: cough and flu like symptoms x 4 days  Acuity: Acute  Type of Encounter: Initial    FINDINGS:  The lungs are without acute focal process. A few small calcified granulomas  are again seen within the left lung. There is no effusion or pneumothorax. The cardiomediastinal silhouette is without acute process. The osseous  structures are without acute process. Surgical clips are seen overlying both  upper lungs, similar to prior. No significant change compared to prior. Impression No acute process. CXR portable:   Results for orders placed during the hospital encounter of 05/15/19   XR CHEST PORTABLE    Narrative EXAMINATION:  ONE XRAY VIEW OF THE CHEST    5/15/2019 10:10 am    COMPARISON:  12/29/2017    HISTORY:  ORDERING SYSTEM PROVIDED HISTORY: chest tightness  TECHNOLOGIST PROVIDED HISTORY:  Reason for exam:->chest tightness  Ordering Physician Provided Reason for Exam: Influenza (dx yesterday with flu  has been taking tamiflu since yesterday )  Acuity: Acute  Type of Exam: Ongoing    FINDINGS:  Cardiac leads project over the chest.  Hazy opacity seen in the left base  with decreased definition of the lateral left hemidiaphragm. No gross  pneumothorax. Cardiac and mediastinal silhouettes are similar to prior. Right upper quadrant clips. Rim calcification is seen at the soft tissues of  the left lateral chest wall.   Clips are seen

## 2019-05-17 NOTE — PROGRESS NOTES
Debbi Gamble is a 68 y.o. female patient.     Current Facility-Administered Medications   Medication Dose Route Frequency Provider Last Rate Last Dose    amLODIPine (NORVASC) tablet 2.5 mg  2.5 mg Oral BID PRN Salomon Reyna MD        guaiFENesin-dextromethorphan [facility] DM) 100-10 MG/5ML syrup 5 mL  5 mL Oral Q4H PRN Salomon Reyna MD   5 mL at 05/16/19 2119    aspirin chewable tablet 81 mg  81 mg Oral Daily Salomon Reyna MD   81 mg at 05/16/19 1139    albuterol sulfate  (90 Base) MCG/ACT inhaler 2 puff  2 puff Inhalation Q6H PRN Salomon Reyna MD        atorvastatin (LIPITOR) tablet 40 mg  40 mg Oral Daily Salomon Reyna MD   40 mg at 05/16/19 1188    carvedilol (COREG) tablet 6.25 mg  6.25 mg Oral BID WC Salomon Reyna MD   6.25 mg at 05/16/19 1707    lisinopril (PRINIVIL;ZESTRIL) tablet 20 mg  20 mg Oral Daily Salomon Reyna MD   20 mg at 05/16/19 8457    mometasone-formoterol (DULERA) 200-5 MCG/ACT inhaler 2 puff  2 puff Inhalation BID Salomon Reyna MD   2 puff at 05/17/19 0850    montelukast (SINGULAIR) tablet 10 mg  10 mg Oral Nightly Salomon Reyna MD   10 mg at 05/16/19 2010    nitroGLYCERIN (NITROSTAT) SL tablet 0.4 mg  0.4 mg Sublingual Q5 Min PRN Salomon Reyna MD        tiotropium (SPIRIVA RESPIMAT) 2.5 MCG/ACT inhaler 2 puff  2 puff Inhalation Daily Salomon Reyna MD   2 puff at 05/17/19 6239    sodium chloride flush 0.9 % injection 10 mL  10 mL Intravenous 2 times per day Salomon Reyna MD   10 mL at 05/15/19 2100    sodium chloride flush 0.9 % injection 10 mL  10 mL Intravenous PRN Salomon Reyna MD        magnesium hydroxide (MILK OF MAGNESIA) 400 MG/5ML suspension 30 mL  30 mL Oral Daily PRN Salomon Reyna MD        ondansetron [facility]) injection 4 mg  4 mg Intravenous Q6H PRN Salomon Reyna MD        enoxaparin (LOVENOX) injection 40 mg  40 mg Subcutaneous Daily Salomon Reyna MD 40 mg at 05/16/19 0819    famotidine (PEPCID) tablet 20 mg  20 mg Oral BID Shahnaz Hernandez MD   20 mg at 05/16/19 2010    albuterol (PROVENTIL) nebulizer solution 2.5 mg  2.5 mg Nebulization Q2H PRN Shahnaz Hernandez MD        acetaminophen (TYLENOL) tablet 650 mg  650 mg Oral Q4H PRN Shahnaz Hernandez MD   650 mg at 05/15/19 2317    azithromycin (ZITHROMAX) 500 mg in D5W 250ml addavial  500 mg Intravenous Q24H Shahnaz Hernandez MD   Stopped at 05/16/19 2123    And    cefTRIAXone (ROCEPHIN) 1 g IVPB in 50 mL D5W minibag  1 g Intravenous Q24H Shahnaz Hernandez MD   Stopped at 05/16/19 2123    0.9 % sodium chloride infusion   Intravenous Continuous Shahnaz Hernandez  mL/hr at 05/17/19 0440      LORazepam (ATIVAN) tablet 0.5 mg  0.5 mg Oral Q4H PRN Shahnaz Hernandez MD        oseltamivir (TAMIFLU) capsule 75 mg  75 mg Oral BID Shahnaz Hernandez MD   75 mg at 05/16/19 2010     Allergies   Allergen Reactions    Darvon [Propoxyphene Hcl]      \"OUT OF BODY EXPERIENCE\"    Minocin [Minocycline Hcl]      BLADDER INFECTION     Principal Problem:    CAP (community acquired pneumonia)  Active Problems:    COPD exacerbation ([facility] [address]    Influenza A    CAP (community acquired pneumonia) due to Chlamydia species  Resolved Problems:    * No resolved hospital problems. *    Blood pressure (!) 172/78, pulse 65, temperature 98.4 °F (36.9 °C), temperature source Oral, resp. rate 16, height 5' 5\" (1.651 m), weight 160 lb 0.9 oz (72.6 kg), SpO2 95 %, not currently breastfeeding. Subjective Feels about the same. Still coughing Still some nausea. Objective Afebrile, Rales and dullness L base, CXR the same. Blood cultures growing staph. Probably a contaminent. Assessment & Plan Improving. Continue present tx. D/W patient.     Shahnaz Hernandez MD  5/17/2019

## 2019-05-17 NOTE — PLAN OF CARE
Problem: Risk for Impaired Skin Integrity  Goal: Tissue integrity - skin and mucous membranes  Description  Structural intactness and normal physiological function of skin and  mucous membranes. 5/17/2019 1254 by Benita Wong RN  Outcome: Ongoing  5/17/2019 0109 by Doe Marinelli RN  Outcome: Ongoing  5/17/2019 0109 by Doe Marinelli RN  Outcome: Ongoing     Problem:  Activity:  Goal: Energy level will increase  Description  Energy level will increase  5/17/2019 1254 by Benita Wong RN  Outcome: Ongoing  5/17/2019 0109 by Doe Marinelli RN  Outcome: Ongoing  5/17/2019 0109 by Doe Marinelli RN  Outcome: Ongoing  Goal: Ability to return to normal activity level will improve  Description  Ability to return to normal activity level will improve  5/17/2019 1254 by Benita Wong RN  Outcome: Ongoing  5/17/2019 0109 by Doe Marinelli RN  Outcome: Ongoing  5/17/2019 0109 by Doe Marinelli RN  Outcome: Ongoing     Problem: Fluid Volume:  Goal: Maintenance of adequate hydration will improve  Description  Maintenance of adequate hydration will improve  5/17/2019 1254 by Benita Wong RN  Outcome: Ongoing  5/17/2019 0109 by Doe Marinelli RN  Outcome: Ongoing  5/17/2019 0109 by Doe Marinelli RN  Outcome: Ongoing     Problem: Health Behavior:  Goal: Adoption of positive health habits will improve  Description  Adoption of positive health habits will improve  5/17/2019 1254 by Benita Wong RN  Outcome: Ongoing  5/17/2019 0109 by Doe Marinelli RN  Outcome: Ongoing  5/17/2019 0109 by Doe Marinelli RN  Outcome: Ongoing  Goal: Compliance with immunization standards will improve  Description  Compliance with immunization standards will improve  5/17/2019 1254 by Benita Wong RN  Outcome: Ongoing  5/17/2019 0109 by Doe Marinelli RN  Outcome: Ongoing  5/17/2019 0109 by Doe Marinelli RN  Outcome: Ongoing     Problem: Physical Regulation:  Goal: Complications related to the disease process, condition or treatment will be avoided or minimized  Description  Complications related to the disease process, condition or treatment will be avoided or minimized  5/17/2019 1254 by Abhishek Ring RN  Outcome: Ongoing  5/17/2019 0109 by Pedro Luis Potter RN  Outcome: Ongoing  5/17/2019 0109 by Pedro Luis Potter RN  Outcome: Ongoing  Goal: Ability to maintain clinical measurements within normal limits will improve  Description  Ability to maintain clinical measurements within normal limits will improve  5/17/2019 1254 by Abhishek Ring RN  Outcome: Ongoing  5/17/2019 0109 by Pedro Luis Potter RN  Outcome: Ongoing  5/17/2019 0109 by Pedro Luis Potter RN  Outcome: Ongoing  Goal: Signs and symptoms of infection will decrease  Description  Signs and symptoms of infection will decrease  5/17/2019 1254 by Abhishek Ring RN  Outcome: Ongoing  5/17/2019 0109 by Pedro Luis Potter RN  Outcome: Ongoing  5/17/2019 0109 by Pedro Luis Potter RN  Outcome: Ongoing     Problem: Respiratory:  Goal: Respiratory status will improve  Description  Respiratory status will improve  5/17/2019 1254 by Abhishek Ring RN  Outcome: Ongoing  5/17/2019 0109 by Pedro Luis Potter RN  Outcome: Ongoing  5/17/2019 0109 by Pedro Luis Potter RN  Outcome: Ongoing     Problem: Sensory:  Goal: General experience of comfort will improve  Description  General experience of comfort will improve  5/17/2019 1254 by Abhishek Ring RN  Outcome: Ongoing  5/17/2019 0109 by Pedro Luis Potter RN  Outcome: Ongoing  5/17/2019 0109 by Pedro Luis Potter RN  Outcome: Ongoing

## 2019-05-17 NOTE — H&P
[address]                              HISTORY AND PHYSICAL    PATIENT NAME: Casa Chahal               :        1945  MED REC NO:   [id number]                          ROOM:       [id number]  ACCOUNT NO:   [de-identified]                           ADMIT DATE: 05/15/2019  PROVIDER:     Brielle Rand MD    CHIEF COMPLAINT:  I do not feel good. HISTORY OF PRESENT ILLNESS:  The patient is a generally healthy  69-year-old white, , retired female who lives in her own home  with her demented . The patient became ill several days prior to  this present admission. She had a refill on her Z-Keith prescription  which she took to treat her Coryza cough, fever, et Sherolyn Dasen. After two  days, symptoms persisted. She went to urgent care. Nasal swab was  done. She was diagnosed with influenza. In addition to the Z-Keith, was  began on Tamiflu 75 mg twice a day. Unfortunately after one dose, the  patient developed nausea, vomiting, was unable to keep food, fluids, and  meds down. This prompted an emergency room visit to [facility].   Emergency Room evaluation included a chest x-ray, which showed a left  lower lobe infiltrate. The patient was still vomiting in the emergency  room. Consequently was began on intravenous fluids. Admission was felt  necessary for intravenous medication, consultation, nursing care, et  cetera. I was contacted and agreed to admit the patient to [facility]  for further treatment. The patient's family consented to admission. PAST MEDICAL HISTORY:  Pertinent for COPD, peripheral vascular disease,  hypertension, coronary artery disease, hyperlipidemia, ischemic  cardiomyopathy. PAST SURGICAL HISTORY:  Pertinent for hysteroscopy, cardiac surgery,  subclavian bypass surgery, appendectomy, breast biopsy, cholecystectomy,  colonoscopy, and tubal ligation.     FAMILY HISTORY:  Noncontributory. ALLERGIES:  Include DARVON and MINOCYCLINE. REVIEW OF SYSTEMS:  The patient admits to malaise, decreased appetite,  nausea, vomiting, cough, some small amount of purulent sputum. No  hemoptysis. No signs of upper or lower gastrointestinal bleeding. No  focal weakness. HOME MEDS PRIOR TO ADMISSION:  Included Z-Keith, Tamiflu, lisinopril,  Lipitor, carvedilol, Dulera, multiple vitamins, Spiriva Respimat,  Singulair, 81 mg aspirin and p.r.n. nitroglycerine which she has not  used for several years. PHYSICAL EXAMINATION FOLLOWING ADMISSION:  VITAL SIGNS:  Temperature 98.1, respirations 18, pulse 65, blood  pressure 145/67. Pulse ox on room air was 91%. GENERAL:  The patient is a well-developed, well-nourished, moderately  ill-appearing white female, lying comfortably in bed, in no acute  distress. She is alert, conversant, and cooperative. SKIN:  Reveals no rash. HEENT:  Head was normal except for some nasal congestion. Mucous  membranes were dry. Thyroid was small but normal.  Carotid pulses were  2+ without bruits. CHEST:  Wall motion was slightly diminished due to her COPD. LUNGS:  Revealed some decreased breath sounds and rales at the left  base. CARDIAC:  Examination revealed a regular rate and rhythm with normal  heart sounds. ABDOMEN:  Showed no mass, organomegaly, or tenderness. PELVIC/RECTAL/BREASTS:  Exams were deferred. NEUROLOGIC EXAMINATION:  With the patient lying in bed revealed some  mild generalized weakness without focal weakness. Station and gait  could not be tested. IMPRESSION ON ADMISSION:  1. Community-acquired left lower lobe pneumonia. 2.  Influenza. 3.  Nausea and vomiting secondary to #1 and #2.  4.  COPD exacerbation. 5.  Hypertension. 6.  Coronary artery disease. 7.  Ischemic cardiomyopathy. PLAN:  For intravenous Rocephin, intravenous azithromycin, nausea  medicine p.r.n. We will try to continue the Tamiflu.   Hydration with  intravenous fluids. Pulmonary consultation. Case Management  consultation. It does not appear that the patient will need physical  therapy at the present time. The patient was agreeable to the plan. Anticipated length of stay greater than 2 midnights. Anticipated locale  of discharge will probably be to return home.           Linda Benitez MD    D: 05/17/2019 9:20:50       T: 05/17/2019 15:26:51     [email or web address]  Job#: [id number]     Doc#: [id number]    CC:

## 2019-05-17 NOTE — PLAN OF CARE
Problem: Risk for Impaired Skin Integrity  Goal: Tissue integrity - skin and mucous membranes  Description  Structural intactness and normal physiological function of skin and  mucous membranes. 5/17/2019 0109 by Ilya Amador RN  Outcome: Ongoing  5/16/2019 1608 by Parish Richard RN  Outcome: Ongoing     Problem:  Activity:  Goal: Energy level will increase  Description  Energy level will increase  5/17/2019 0109 by Ilya Amador RN  Outcome: Ongoing  5/16/2019 1608 by Parish Richard RN  Outcome: Ongoing  Goal: Ability to return to normal activity level will improve  Description  Ability to return to normal activity level will improve  5/17/2019 0109 by Ilya Amador RN  Outcome: Ongoing  5/16/2019 1608 by Parish Richard RN  Outcome: Ongoing     Problem: Fluid Volume:  Goal: Maintenance of adequate hydration will improve  Description  Maintenance of adequate hydration will improve  5/17/2019 0109 by Ilya Amador RN  Outcome: Ongoing  5/16/2019 1608 by Parish Richard RN  Outcome: Ongoing     Problem: Health Behavior:  Goal: Adoption of positive health habits will improve  Description  Adoption of positive health habits will improve  5/17/2019 0109 by Ilya Amador RN  Outcome: Ongoing  5/16/2019 1608 by Parish Richard RN  Outcome: Ongoing  Goal: Compliance with immunization standards will improve  Description  Compliance with immunization standards will improve  5/17/2019 0109 by Ilya Amador RN  Outcome: Ongoing  5/16/2019 1608 by Parish Richard RN  Outcome: Ongoing     Problem: Physical Regulation:  Goal: Complications related to the disease process, condition or treatment will be avoided or minimized  Description  Complications related to the disease process, condition or treatment will be avoided or minimized  5/17/2019 0109 by Ilya Amador RN  Outcome: Ongoing  5/16/2019 1608 by Parish Richard

## 2019-05-18 LAB
BASOPHILS ABSOLUTE: 0 K/UL (ref 0–0.2)
BASOPHILS RELATIVE PERCENT: 0.7 %
CULTURE, BLOOD 2: ABNORMAL
EOSINOPHILS ABSOLUTE: 0 K/UL (ref 0–0.6)
EOSINOPHILS RELATIVE PERCENT: 0.2 %
HCT VFR BLD CALC: 38.2 % (ref 36–48)
HEMOGLOBIN: 13.3 G/DL (ref 12–16)
LYMPHOCYTES ABSOLUTE: 1.8 K/UL (ref 1–5.1)
LYMPHOCYTES RELATIVE PERCENT: 43.3 %
MCH RBC QN AUTO: 30.9 PG (ref 26–34)
MCHC RBC AUTO-ENTMCNC: 34.7 G/DL (ref 31–36)
MCV RBC AUTO: 89.1 FL (ref 80–100)
MONOCYTES ABSOLUTE: 0.3 K/UL (ref 0–1.3)
MONOCYTES RELATIVE PERCENT: 7.9 %
NEUTROPHILS ABSOLUTE: 2 K/UL (ref 1.7–7.7)
NEUTROPHILS RELATIVE PERCENT: 47.9 %
ORGANISM: ABNORMAL
ORGANISM: ABNORMAL
PDW BLD-RTO: 12.9 % (ref 12.4–15.4)
PLATELET # BLD: 230 K/UL (ref 135–450)
PMV BLD AUTO: 6.4 FL (ref 5–10.5)
RBC # BLD: 4.29 M/UL (ref 4–5.2)
WBC # BLD: 4.2 K/UL (ref 4–11)

## 2019-05-18 PROCEDURE — 6370000000 HC RX 637 (ALT 250 FOR IP): Performed by: INTERNAL MEDICINE

## 2019-05-18 PROCEDURE — 2580000003 HC RX 258: Performed by: INTERNAL MEDICINE

## 2019-05-18 PROCEDURE — 1200000000 HC SEMI PRIVATE

## 2019-05-18 PROCEDURE — 36415 COLL VENOUS BLD VENIPUNCTURE: CPT

## 2019-05-18 PROCEDURE — 94640 AIRWAY INHALATION TREATMENT: CPT

## 2019-05-18 PROCEDURE — 94760 N-INVAS EAR/PLS OXIMETRY 1: CPT

## 2019-05-18 PROCEDURE — 85025 COMPLETE CBC W/AUTO DIFF WBC: CPT

## 2019-05-18 PROCEDURE — 6360000002 HC RX W HCPCS: Performed by: INTERNAL MEDICINE

## 2019-05-18 RX ORDER — CARVEDILOL 12.5 MG/1
12.5 TABLET ORAL 2 TIMES DAILY WITH MEALS
Status: DISCONTINUED | OUTPATIENT
Start: 2019-05-18 | End: 2019-05-19 | Stop reason: HOSPADM

## 2019-05-18 RX ADMIN — MONTELUKAST SODIUM 10 MG: 10 TABLET, FILM COATED ORAL at 21:29

## 2019-05-18 RX ADMIN — FAMOTIDINE 20 MG: 20 TABLET ORAL at 21:29

## 2019-05-18 RX ADMIN — Medication 10 ML: at 21:29

## 2019-05-18 RX ADMIN — LISINOPRIL 20 MG: 20 TABLET ORAL at 07:58

## 2019-05-18 RX ADMIN — ONDANSETRON 4 MG: 2 INJECTION INTRAMUSCULAR; INTRAVENOUS at 09:25

## 2019-05-18 RX ADMIN — AZITHROMYCIN MONOHYDRATE 500 MG: 500 INJECTION, POWDER, LYOPHILIZED, FOR SOLUTION INTRAVENOUS at 18:08

## 2019-05-18 RX ADMIN — MOMETASONE FUROATE AND FORMOTEROL FUMARATE DIHYDRATE 2 PUFF: 200; 5 AEROSOL RESPIRATORY (INHALATION) at 08:13

## 2019-05-18 RX ADMIN — FAMOTIDINE 20 MG: 20 TABLET ORAL at 07:58

## 2019-05-18 RX ADMIN — ATORVASTATIN CALCIUM 40 MG: 40 TABLET, FILM COATED ORAL at 07:58

## 2019-05-18 RX ADMIN — ASPIRIN 81 MG 81 MG: 81 TABLET ORAL at 07:58

## 2019-05-18 RX ADMIN — OSELTAMIVIR PHOSPHATE 75 MG: 75 CAPSULE ORAL at 07:58

## 2019-05-18 RX ADMIN — CARVEDILOL 6.25 MG: 6.25 TABLET, FILM COATED ORAL at 07:58

## 2019-05-18 RX ADMIN — CEFTRIAXONE 1 G: 1 INJECTION, POWDER, FOR SOLUTION INTRAMUSCULAR; INTRAVENOUS at 21:28

## 2019-05-18 RX ADMIN — MOMETASONE FUROATE AND FORMOTEROL FUMARATE DIHYDRATE 2 PUFF: 200; 5 AEROSOL RESPIRATORY (INHALATION) at 21:03

## 2019-05-18 RX ADMIN — ENOXAPARIN SODIUM 40 MG: 40 INJECTION SUBCUTANEOUS at 07:58

## 2019-05-18 RX ADMIN — SODIUM CHLORIDE: 9 INJECTION, SOLUTION INTRAVENOUS at 15:13

## 2019-05-18 RX ADMIN — CARVEDILOL 12.5 MG: 12.5 TABLET, FILM COATED ORAL at 18:08

## 2019-05-18 ASSESSMENT — PAIN DESCRIPTION - PAIN TYPE: TYPE: ACUTE PAIN

## 2019-05-18 ASSESSMENT — PAIN SCALES - GENERAL
PAINLEVEL_OUTOF10: 0
PAINLEVEL_OUTOF10: 0

## 2019-05-18 NOTE — PLAN OF CARE
Problem: Risk for Impaired Skin Integrity  Goal: Tissue integrity - skin and mucous membranes  Description  Structural intactness and normal physiological function of skin and  mucous membranes. 5/18/2019 0914 by Marivel Hall RN  Outcome: Ongoing  5/18/2019 0114 by Doe Marinelli RN  Outcome: Ongoing     Problem:  Activity:  Goal: Energy level will increase  Description  Energy level will increase  5/18/2019 0914 by Marivel Hall RN  Outcome: Ongoing  5/18/2019 0114 by Doe Marinelli RN  Outcome: Ongoing  Goal: Ability to return to normal activity level will improve  Description  Ability to return to normal activity level will improve  5/18/2019 0914 by Marivel Hall RN  Outcome: Ongoing  5/18/2019 0114 by Doe Marinelli RN  Outcome: Ongoing     Problem: Fluid Volume:  Goal: Maintenance of adequate hydration will improve  Description  Maintenance of adequate hydration will improve  5/18/2019 0914 by Marivel Hall RN  Outcome: Ongoing  5/18/2019 0114 by Doe Marinelli RN  Outcome: Ongoing     Problem: Health Behavior:  Goal: Adoption of positive health habits will improve  Description  Adoption of positive health habits will improve  5/18/2019 0914 by Marivel Hall RN  Outcome: Ongoing  5/18/2019 0114 by Doe Marinelli RN  Outcome: Ongoing  Goal: Compliance with immunization standards will improve  Description  Compliance with immunization standards will improve  5/18/2019 0914 by Marivel Hall RN  Outcome: Ongoing  5/18/2019 0114 by Doe Marinelli RN  Outcome: Ongoing     Problem: Physical Regulation:  Goal: Complications related to the disease process, condition or treatment will be avoided or minimized  Description  Complications related to the disease process, condition or treatment will be avoided or minimized  5/18/2019 0914 by Marivel Hall RN  Outcome: Ongoing  5/18/2019 0114 by Doe Marinelli RN  Outcome: Ongoing  Goal: Ability to maintain clinical measurements within normal limits will improve  Description  Ability to maintain clinical measurements within normal limits will improve  5/18/2019 0914 by Charla Graham RN  Outcome: Ongoing  5/18/2019 0114 by Ilya Amador RN  Outcome: Ongoing  Goal: Signs and symptoms of infection will decrease  Description  Signs and symptoms of infection will decrease  5/18/2019 0914 by Charla Graham RN  Outcome: Ongoing  5/18/2019 0114 by Ilya Amador RN  Outcome: Ongoing     Problem: Respiratory:  Goal: Respiratory status will improve  Description  Respiratory status will improve  5/18/2019 0914 by Charla Graham RN  Outcome: Ongoing  5/18/2019 0114 by Ilya Amador RN  Outcome: Ongoing     Problem: Sensory:  Goal: General experience of comfort will improve  Description  General experience of comfort will improve  5/18/2019 0914 by Charla Graham RN  Outcome: Ongoing  5/18/2019 0114 by Ilya Amador RN  Outcome: Ongoing

## 2019-05-18 NOTE — PROGRESS NOTES
Micki Galarza is a 68 y.o. female patient.     Current Facility-Administered Medications   Medication Dose Route Frequency Provider Last Rate Last Dose    carvedilol (COREG) tablet 12.5 mg  12.5 mg Oral BID  Nirali Montelongo MD        amLODIPine (NORVASC) tablet 2.5 mg  2.5 mg Oral BID PRN Nirali Montelongo MD        aluminum & magnesium hydroxide-simethicone (MAALOX) 200-200-20 MG/5ML suspension 30 mL  30 mL Oral Q6H PRN Nirali Montelongo MD        guaiFENesin-dextromethorphan [facility] DM) 100-10 MG/5ML syrup 5 mL  5 mL Oral Q4H PRN Nirali Montelongo MD   5 mL at 05/17/19 2204    aspirin chewable tablet 81 mg  81 mg Oral Daily Nirali Montelongo MD   81 mg at 05/18/19 0758    albuterol sulfate  (90 Base) MCG/ACT inhaler 2 puff  2 puff Inhalation Q6H PRN Nirali Montelongo MD        atorvastatin (LIPITOR) tablet 40 mg  40 mg Oral Daily Nirali Montelongo MD   40 mg at 05/18/19 0758    lisinopril (PRINIVIL;ZESTRIL) tablet 20 mg  20 mg Oral Daily Nirali Montelongo MD   20 mg at 05/18/19 0758    mometasone-formoterol (DULERA) 200-5 MCG/ACT inhaler 2 puff  2 puff Inhalation BID Nirali Montelongo MD   2 puff at 05/18/19 0813    montelukast (SINGULAIR) tablet 10 mg  10 mg Oral Nightly Nirali Montelongo MD   10 mg at 05/17/19 2024    nitroGLYCERIN (NITROSTAT) SL tablet 0.4 mg  0.4 mg Sublingual Q5 Min PRN Nirali Montelongo MD        tiotropium (SPIRIVA RESPIMAT) 2.5 MCG/ACT inhaler 2 puff  2 puff Inhalation Daily Nirali Montelongo MD   Stopped at 05/18/19 9699    sodium chloride flush 0.9 % injection 10 mL  10 mL Intravenous 2 times per day Nirali Montelongo MD   10 mL at 05/15/19 2100    sodium chloride flush 0.9 % injection 10 mL  10 mL Intravenous PRN Nirali Montelongo MD        magnesium hydroxide (MILK OF MAGNESIA) 400 MG/5ML suspension 30 mL  30 mL Oral Daily PRN Nirali Montelongo MD        ondansetron [facility]) injection 4 mg  4 mg Intravenous Q6H PRN Carin Lino MD   4 mg at 05/18/19 1241    enoxaparin (LOVENOX) injection 40 mg  40 mg Subcutaneous Daily Carin Lino MD   40 mg at 05/18/19 0758    famotidine (PEPCID) tablet 20 mg  20 mg Oral BID Carin Lino MD   20 mg at 05/18/19 0758    albuterol (PROVENTIL) nebulizer solution 2.5 mg  2.5 mg Nebulization Q2H PRN Carin Lino MD        acetaminophen (TYLENOL) tablet 650 mg  650 mg Oral Q4H PRN Carin Lino MD   650 mg at 05/15/19 2317    azithromycin (ZITHROMAX) 500 mg in D5W 250ml addavial  500 mg Intravenous Q24H Carin Lino MD   Stopped at 05/17/19 2157    And    cefTRIAXone (ROCEPHIN) 1 g IVPB in 50 mL D5W minibag  1 g Intravenous Q24H Carin Lino MD   Stopped at 05/17/19 2135    0.9 % sodium chloride infusion   Intravenous Continuous Carin Lino MD 50 mL/hr at 05/17/19 1704      LORazepam (ATIVAN) tablet 0.5 mg  0.5 mg Oral Q4H PRN Carin Lino MD         Allergies   Allergen Reactions    Darvon [Propoxyphene Hcl]      \"OUT OF BODY EXPERIENCE\"    Minocin [Minocycline Hcl]      BLADDER INFECTION     Principal Problem:    CAP (community acquired pneumonia)  Active Problems:    COPD exacerbation ([address]    Influenza A    CAP (community acquired pneumonia) due to Chlamydia species  Resolved Problems:    * No resolved hospital problems. *    Blood pressure (!) 170/74, pulse 66, temperature 97.9 °F (36.6 °C), temperature source Oral, resp. rate 14, height 5' 5\" (1.651 m), weight 158 lb 11.7 oz (72 kg), SpO2 95 %, not currently breastfeeding. Subjective N&V this AM.  Objective A&O, rales L base, RR&R, no edema. Assessment & Plan D/C Tamiflu D/T N&V. Heath Challenger CXR Sunday. Probable D/C home Sunday on PO ATBs. Increase Coreg D/T increased HBP.   Carin Lino MD  5/18/2019

## 2019-05-18 NOTE — PLAN OF CARE
RN  Outcome: Ongoing  Goal: Ability to maintain clinical measurements within normal limits will improve  Description  Ability to maintain clinical measurements within normal limits will improve  5/18/2019 0114 by Pedro Luis Potter RN  Outcome: Ongoing  5/17/2019 1254 by Abhihsek Ring RN  Outcome: Ongoing  Goal: Signs and symptoms of infection will decrease  Description  Signs and symptoms of infection will decrease  5/18/2019 0114 by Pedro Luis Potter RN  Outcome: Ongoing  5/17/2019 1254 by Abhishek Ring RN  Outcome: Ongoing     Problem: Respiratory:  Goal: Respiratory status will improve  Description  Respiratory status will improve  5/18/2019 0114 by Pedro Luis Potter RN  Outcome: Ongoing  5/17/2019 1254 by Abhishek Ring RN  Outcome: Ongoing     Problem: Sensory:  Goal: General experience of comfort will improve  Description  General experience of comfort will improve  5/18/2019 0114 by Pedro Luis Potter RN  Outcome: Ongoing  5/17/2019 1254 by Abhishek Ring RN  Outcome: Ongoing

## 2019-05-18 NOTE — PROGRESS NOTES
Pt had episode of emesis. States not feeling well this morning. Prn Zofran given per MD orders. Will continue to monitor.

## 2019-05-19 ENCOUNTER — APPOINTMENT (OUTPATIENT)
Dept: GENERAL RADIOLOGY | Age: 74
DRG: 195 | End: 2019-05-19
Payer: MEDICARE

## 2019-05-19 VITALS
HEIGHT: 65 IN | TEMPERATURE: 98.1 F | DIASTOLIC BLOOD PRESSURE: 62 MMHG | SYSTOLIC BLOOD PRESSURE: 150 MMHG | BODY MASS INDEX: 26.01 KG/M2 | HEART RATE: 71 BPM | OXYGEN SATURATION: 93 % | WEIGHT: 156.09 LBS | RESPIRATION RATE: 18 BRPM

## 2019-05-19 PROCEDURE — 6360000002 HC RX W HCPCS: Performed by: INTERNAL MEDICINE

## 2019-05-19 PROCEDURE — 94760 N-INVAS EAR/PLS OXIMETRY 1: CPT

## 2019-05-19 PROCEDURE — 2580000003 HC RX 258: Performed by: INTERNAL MEDICINE

## 2019-05-19 PROCEDURE — 94640 AIRWAY INHALATION TREATMENT: CPT

## 2019-05-19 PROCEDURE — 6370000000 HC RX 637 (ALT 250 FOR IP): Performed by: INTERNAL MEDICINE

## 2019-05-19 PROCEDURE — 71046 X-RAY EXAM CHEST 2 VIEWS: CPT

## 2019-05-19 RX ORDER — CARVEDILOL 12.5 MG/1
12.5 TABLET ORAL 2 TIMES DAILY
Qty: 180 TABLET | Refills: 3 | Status: SHIPPED | OUTPATIENT
Start: 2019-05-19

## 2019-05-19 RX ORDER — LEVOFLOXACIN 500 MG/1
500 TABLET, FILM COATED ORAL DAILY
Qty: 7 TABLET | Refills: 0 | Status: SHIPPED | OUTPATIENT
Start: 2019-05-19 | End: 2019-05-26

## 2019-05-19 RX ADMIN — ACETAMINOPHEN 650 MG: 325 TABLET ORAL at 04:44

## 2019-05-19 RX ADMIN — ATORVASTATIN CALCIUM 40 MG: 40 TABLET, FILM COATED ORAL at 08:38

## 2019-05-19 RX ADMIN — MOMETASONE FUROATE AND FORMOTEROL FUMARATE DIHYDRATE 2 PUFF: 200; 5 AEROSOL RESPIRATORY (INHALATION) at 08:18

## 2019-05-19 RX ADMIN — ASPIRIN 81 MG 81 MG: 81 TABLET ORAL at 08:38

## 2019-05-19 RX ADMIN — FAMOTIDINE 20 MG: 20 TABLET ORAL at 08:37

## 2019-05-19 RX ADMIN — LISINOPRIL 20 MG: 20 TABLET ORAL at 08:38

## 2019-05-19 RX ADMIN — CARVEDILOL 12.5 MG: 12.5 TABLET, FILM COATED ORAL at 08:38

## 2019-05-19 RX ADMIN — Medication 10 ML: at 08:40

## 2019-05-19 ASSESSMENT — PAIN SCALES - GENERAL
PAINLEVEL_OUTOF10: 7
PAINLEVEL_OUTOF10: 0

## 2019-05-19 NOTE — FLOWSHEET NOTE
05/19/19 1205   Encounter Summary   Services provided to: Patient   Referral/Consult From: Nurse   Support System Spouse; Children   Volunteer Visit   (5/19 Support - son is hospitalized; prayer w/ pt )   Complexity of Encounter Moderate   Length of Encounter 30 minutes   Spiritual/Lutheran   Type Spiritual support   Assessment   (worried re: son in hospital,  has dementia)   Intervention Explored feelings, thoughts, concerns;Prayer;Discussed belief system/Anabaptist practices/deborah   Outcome Shared life review;Expressed feelings/needs/concerns
[email or web address]

## 2019-05-19 NOTE — PLAN OF CARE
Problem:  Activity:  Goal: Energy level will increase  Description  Energy level will increase  5/19/2019 1455 by Claire Ashraf RN  Outcome: Ongoing  5/19/2019 0246 by Jasmine Carmona RN  Outcome: Ongoing  Goal: Ability to return to normal activity level will improve  Description  Ability to return to normal activity level will improve  5/19/2019 1455 by Claire Ashraf RN  Outcome: Ongoing  5/19/2019 0246 by Jasmine Carmona RN  Outcome: Ongoing     Problem: Fluid Volume:  Goal: Maintenance of adequate hydration will improve  Description  Maintenance of adequate hydration will improve  5/19/2019 1455 by Claire Ashraf RN  Outcome: Ongoing  5/19/2019 0246 by Jasmine Carmona RN  Outcome: Ongoing     Problem: Health Behavior:  Goal: Adoption of positive health habits will improve  Description  Adoption of positive health habits will improve  Outcome: Ongoing  Goal: Compliance with immunization standards will improve  Description  Compliance with immunization standards will improve  Outcome: Ongoing     Problem: Physical Regulation:  Goal: Complications related to the disease process, condition or treatment will be avoided or minimized  Description  Complications related to the disease process, condition or treatment will be avoided or minimized  Outcome: Ongoing  Goal: Ability to maintain clinical measurements within normal limits will improve  Description  Ability to maintain clinical measurements within normal limits will improve  Outcome: Ongoing  Goal: Signs and symptoms of infection will decrease  Description  Signs and symptoms of infection will decrease  Outcome: Ongoing     Problem: Respiratory:  Goal: Respiratory status will improve  Description  Respiratory status will improve  5/19/2019 1455 by Claire Ashraf RN  Outcome: Ongoing  5/19/2019 0246 by Jasmine Carmona RN  Outcome: Ongoing     Problem: Sensory:  Goal: General experience of comfort will improve  Description  General experience of comfort will improve  Outcome: Ongoing

## 2019-05-19 NOTE — PROGRESS NOTES
Verbal and written discharge instructions were given to the patient, patient verbalizes understanding of discharge instructions including medications orders for home and possible side effects associated with them. Patient instructed and verbalizes understanding to call the doctor listed if they should have any complications or worsening symptoms. Verbalizes understanding about follow-up appointments. D/C IV patient tolerated well, no signs of bleeding. Patient discharged home with all belongings. Out via wheelchair.

## 2019-05-19 NOTE — PROGRESS NOTES
Intravenous Q6H PRN Jazmin Luna MD   4 mg at 05/18/19 3784    enoxaparin (LOVENOX) injection 40 mg  40 mg Subcutaneous Daily Jazmin Luna MD   40 mg at 05/18/19 0758    famotidine (PEPCID) tablet 20 mg  20 mg Oral BID Jazmin Luna MD   20 mg at 05/19/19 0451    acetaminophen (TYLENOL) tablet 650 mg  650 mg Oral Q4H PRN Jazmin Luna MD   650 mg at 05/19/19 0444    azithromycin (ZITHROMAX) 500 mg in D5W 250ml addavial  500 mg Intravenous Q24H Jazmin Luna MD   Stopped at 05/18/19 2132    And    cefTRIAXone (ROCEPHIN) 1 g IVPB in 50 mL D5W minibag  1 g Intravenous Q24H Jazmin Luna MD   Stopped at 05/19/19 0441    LORazepam (ATIVAN) tablet 0.5 mg  0.5 mg Oral Q4H PRN Jazmin Luna MD         Allergies   Allergen Reactions    Darvon [Propoxyphene Hcl]      \"OUT OF BODY EXPERIENCE\"    Minocin [Minocycline Hcl]      BLADDER INFECTION     Principal Problem:    CAP (community acquired pneumonia)  Active Problems:    COPD exacerbation ([address]    Influenza A    CAP (community acquired pneumonia) due to Chlamydia species  Resolved Problems:    * No resolved hospital problems. *    Blood pressure (!) 173/78, pulse 69, temperature 98.2 °F (36.8 °C), temperature source Oral, resp. rate 16, height 5' 5\" (1.651 m), weight 156 lb 1.4 oz (70.8 kg), SpO2 93 %, not currently breastfeeding. Subjective Feels better. Less cough. Objective Few rales L base. CXR looks better 2 me. Assessment & Plan Ok for D/C on Levoquin. D/W patient.     Jazmin Luna MD  5/19/2019

## 2019-05-20 ENCOUNTER — CARE COORDINATION (OUTPATIENT)
Dept: CASE MANAGEMENT | Age: 74
End: 2019-05-20

## 2019-05-20 LAB — BLOOD CULTURE, ROUTINE: NORMAL

## 2019-05-20 NOTE — LETTER
Beneficiary Notification Letter     This [facility] Provider is Participating in an Innovative Payment and [address] [place] [address] [place] from [employer]     Greetings:   [facility] is participating in a Medicare initiative called the [place] [facility]. You are receiving this letter because your health care provider has identified you as a patient who is receiving care through this initiative. Health care providers participating in the [facility] [address] [facility], including [facility], will work with Medicare to improve care for patients. Your Medicare rights have not been changed. You still have all the same Medicare rights and protections, including the right to choose which hospital, doctor, or other health care provider you see. However, because [facility] chose to participate in the [address] [place] [address], all Medicare beneficiaries who meet the eligibility criteria of this initiative will receive care under the initiative. If you do not wish to receive care under the Bundled Payments [facility] [address] [facility], you must choose a health care provider that does not participate in this initiative for your care. Regardless of which health care provider you see, Medicare will continue to cover all of your medically necessary services. Bundled Payments for Care Improvement Advanced aims to help improve your care     The Bundled Payments [facility] [address] [facility] is an innovative Medicare initiative that encourages your doctors, hospitals, and other health care providers to work more closely together so you get better care during and following certain hospital stays.  In this initiative, doctors and hospitals may work closely with certain health care providers and suppliers that help patients recover after discharge from the hospital, including skilled nursing facilities, home health agencies, inpatient rehabilitation facilities, and long term care hospitals. [facility] is working closely with the doctors and other health care providers that care for you during and following your hospital stay and for a period of time after you leave the hospital. By working together, the health care providers are trying to more efficiently provide well-managed, high quality, patient-centered care as you undergo treatment. Hospitals, doctors, and other health care providers that care for you following a hospital stay may receive an additional payment for providing better, more coordinated health care. Medicare will monitor your care to make sure you and others get high quality care. Your feedback is important     Medicare may also ask you to answer a survey about the services and care you received from [address] [place] [address] will be mailed to you. Your feedback will improve care for all people with Medicare who receive care from [facility]. Completion of this survey is optional.     Get more information     For more information about the Bundled Payments for [address] [place] [address], you can:    · Visit the CMS BPCI Advanced Website at http://osborn-centeno.net/ initiatives/bpci-advanced   · Call the [facility] team at (651) 388-5499. · Call 1-800-MEDICARE (5-181.678.3465). TTY users can call 4-464.461.4350     If you have concerns or complaints about your care, talk to your health care provider, or contact your Beneficiary and Family Centered Quality Improvement Organization [facility]). To get your [facility]'s phone number, visit Medicare.gov/contacts or call 1-800-MEDICARE. · To find a different hospital, visit www. hospitalcompare.[facility].gov or call 1-800Selleroutlet MEDICARE (1-928.140.2477). TTY users should call 1-492.301.9927. · To find a different doctor, visit Medicare's Physician Compare website, HDTapes.co.nz, or call 1-800-MEDICARE (348 2753). TTY users should call 1-612.747.3674. · To find a different skilled nursing facility, visit [facility] website, https://www."[employer].Moxie Jean/, or call 1-800-MEDICARE (1- 443.654.4319). TTY users should call 3-647.525.1416. · To find a different long term care hospital, visit [facility] Compare website, Sm[employer]adel[employer].be, or call 1-800- MEDICARE (879 9077). TTY users should call 0-908.333.9328. · To find a different inpatient rehabilitation facility, visit [facility] Compare website, www.medicare.gov/ inpatientrehabilitation facilitycompare, or call 1-800-MEDICARE (6-170.346.5915). TTY users should call 2- 774.665.7378. · To find a different home health agency, visit [address] website, www.medicare.gov/homehealthcompare, or call 1-800-MEDICARE (5-175- 334-6525). TTY users should call 1-770.461.1144.

## 2019-05-20 NOTE — CARE COORDINATION
Angel [address] Transitions Initial Follow Up Call    Call within 2 business days of discharge: Yes    Patient: Keith Hunter Patient : 1945   MRN: [id number]  Reason for Admission:   Discharge Date: 19 RARS: Readmission Risk Score: 12      Last Discharge [address] [address]       Complaint Diagnosis Description Type Department Provider    5/15/19 Influenza; Emesis Pneumonia due to organism . .. ED to Hosp-Admission (Discharged) (ADMITTED) WSELY 4W Raj Gill MD; Nicholas Worrell... Spoke with: no one    Facility: [address] [place] [address] Transitions 24 Hour Call    Care Transitions Interventions         Follow Up: Unable to reach patient. Unable to leave message. Home number - no answer & no voicemail. Mobile number - no answer & no voicemail set up. BPCI-A CMS Beneficiary Notification Letter mailed with [facility] contact info.   Future Appointments   Date Time Provider Juan Miguel Leo   2019  8:20 AM MD CARMEN Yee PULM [facility]   10/2/2019 10:00 AM Margaretta Boxer, MD FF Cardio [facility]   2019 11:00 AM Lakeisha Skinner MD PULNOEL & CC [facility]       Claudette Feliz, KENDRA

## 2019-05-20 NOTE — DISCHARGE SUMMARY
[address]                               DISCHARGE SUMMARY    PATIENT NAME: Esperanza Arzola               :        1945  MED REC NO:   [id number]                          ROOM:       5945  ACCOUNT NO:   [de-identified]                           ADMIT DATE: 05/15/2019  PROVIDER:     Aguilar Alcazar MD                  DISCHARGE DATE:  2019        FINAL DIAGNOSES:  1. Community-acquired pneumonia. 2.  Influenza A.  3.  Nausea and vomiting. 4.  Uncontrolled hypertension. 5.  COPD. 6.  Peripheral vascular disease. HISTORY:  The patient is a fairly healthy 81-year-old white   retired female who lives in her own home with her  who suffers  from mild dementia. The patient had been ill for several days prior to  this admission. She started herself on a Z-RICHARD, went to urgent care,  was also prescribed Tamiflu 75 mg twice a day after a nasal swab  positive for influenza A. Unfortunately, the patient developed repeated  nausea and vomiting, inability to keep down fluids and medications. She  was brought to the Emergency Room. In the Emergency Room visit, she was  sick and vomiting. She was given a bolus of IV fluids, begun on  continuous intravenous fluids. A chest x-ray in the Emergency Room  showed left lower lobe infiltrate or atelectasis. Admission was felt  necessary. I was contacted and agreed to admit the patient for  intravenous fluids and intravenous antibiotics, Pulmonary consultation,  and nursing care. The patient's family agreed to admission. ALLERGIES:  The patient was known to be allergic to DARVON and  MINOCYCLINE. APPROPRIATE STUDIES:  Initial white count was 4300. Subsequently, she  became neutropenic with a white count down to 2600. This was presumed  to be secondary to viral infection.   Hemoglobin and hematocrit were  normal.  Chemistry profile was normal.  On 05/18/2019, white count was  back up to 4200. Chemistry profile remained normal except for slightly  low sodium of 134. Electrocardiogram showed no acute change. Repeat  chest x-ray x2 showed some persistence of left lower lobe infiltrate  with no worsening. HOSPITAL COURSE:  The patient's hospital course is characterized by  improvement. She was given intravenous Rocephin, intravenous Zithromax. She was given oral Tamiflu but after two days, she had persistent nausea  and vomiting. Consequently, since she was clinically improving and  afebrile, I elected to discontinue Tamiflu. She was seen in Pulmonary  consultation who agreed with treatment. Gradually, her malaise, cough,  nausea, and vomiting improved and on 05/19/2019, she was eager for  discharge because she was improved and also to return home to care for  her . Consequently, she was denied home in improved condition. DISCHARGE MEDICATIONS:  Included carvedilol 12.5 mg twice a day. The  dose was increased due to her hypertension, baby aspirin once a day,  Singulair 10 mg once a day, Dulera 200 two puffs twice a day, Levaquin  500 mg generic once a day for seven days, Spiriva Respimat two puffs  each morning, albuterol metered-dose inhaler two puffs q.4h. p.r.n.  shortness of breath. The patient's new prescriptions were sent to  [facility] in [facility]. The patient was advised to see Dr. Candice Carmichael and follow up in one to  two weeks following discharge. She was discharged in improved  condition. She declined home care and it did not appear that skilled  nursing facility was indicated.         Gene Palma MD    D: 05/19/2019 12:42:28       T: 05/19/2019 22:57:32     [email or web address]  Job#: [id number]     Doc#: [id number]    CC:  <>

## 2019-05-21 NOTE — CARE COORDINATION
Angel [address] Transitions Initial Follow Up Call    Call within 2 business days of discharge: Yes    Patient: Elvis Ferrer Patient : 1945   MRN: [id number]  Reason for Admission:   Discharge Date: 19 RARS: Readmission Risk Score: 12      Last Discharge [address] [address]       Complaint Diagnosis Description Type Department Provider    5/15/19 Influenza; Emesis Pneumonia due to organism . .. ED to Hosp-Admission (Discharged) (ADMITTED) ELY 4W Shahnaz Hernandez MD; Abe Collins... Facility: [facility]   Non-face-to-face services provided:      Care Transitions 24 Hour Call    Care Transitions Interventions         Follow Up : 2nd attempt to reach patient for bpci initial discharge call. Left a message on home phone and no one answered the cell phone.  Will re attempt at a later time   Future Appointments   Date Time Provider Juan Miguel Leo   2019  5:15 PM SCHEDULE, [address] [place] [address] None   2019  8:20 AM Keegan Madsen MD CARMEN PULM [facility]   10/2/2019 10:00 AM Jamey Smith MD FF Cardio [facility]   2019 11:00 AM Jennifer Anglin MD PULM & [facility]       Laura Bustos RN

## 2019-05-23 NOTE — H&P
[address] [place] [address] Laura Tse                               HISTORY AND PHYSICAL    PATIENT NAME: Mehrdad Harris               :        1945  MED REC NO:   [id number]                          ROOM:       5832  ACCOUNT NO:   [de-identified]                           ADMIT DATE: 05/15/2019  PROVIDER:     Governor Ki MD    CHIEF COMPLAINT:  \"I don't feel good. HISTORY OF PRESENT ILLNESS:  The patient is a fairly healthy 81-year-old  white, , retired female who lives in her own home with her   who suffers from mild dementia. On Mother's Day, the patient  became ill with malaise, subsequently developed fever, chills, and some  cough. She had a previous prescription for Z-RICHARD so she refilled that,  began oral Z-RICHARD. After two or three days, she did not feel any better. Consequently, one day prior to this present admission, she went to  urgent care. Apparently, a rapid influenza test was positive for  influenza, and in addition to her Z-RICHARD, she was also begun on Tamiflu  75 mg twice a day. Unfortunately, the patient had persistent malaise,  persistent fever, worsening cough, and also developed repeated nausea,  vomiting with inability to keep food, liquids, or medication down. This  prompted a 911 call and she was brought to the emergency room at [facility].  Emergency room evaluation included a portable chest x-ray that  showed a small left lower lobe infiltrate. Blood cultures were drawn. Chemistry profile showed a sodium of 131, potassium was normal, BUN was  9, creatinine 0.5. Liver enzymes were normal, albumin was normal.  CBC  in the emergency room showed a white count of 4300, hemoglobin 14.7,  hematocrit 41.9, platelet count was 871,248. Electrocardiogram showed  no acute changes.   Because of the patient's persistent nausea, vomiting,  inability to keep oral medication down, prior history of COPD, admission  was felt necessary. I was contacted, agreed to admit the patient to Med  Surg with Pulmonary consultation and intravenous antibiotics and fluid. The patient's family consented to admission. REVIEW OF SYSTEMS:  The patient denies hemoptysis. No upper or lower  gastrointestinal bleeding. Some increased dyspnea on exertion. PAST MEDICAL HISTORY:  Pertinent for hypertension, COPD, hyperlipidemia,  coronary atherosclerosis, ischemic cardiomyopathy. PAST SURGICAL HISTORY:  Included hysteroscopy, cardiac surgery,  subclavian bypass graft for upper extremity vascular disease,  appendectomy, breast biopsy, cholecystectomy, tubal ligation, previous  colonoscopy. ALLERGIES:  DARVON and MINOCYCLINE. CURRENT MEDICATIONS:  Meds prior to admission included Lipitor,  carvedilol, lisinopril, Dulera, Singulair, Nitrostat which she has not  used in some time, Spiriva Respimat, albuterol inhaler p.r.n. SOCIAL HISTORY:  The patient stopped smoking a number of years ago. She  is retired, lives with her  who has mild dementia, has a son in  [place] with COPD who is currently hospitalized. No illicit drugs. FAMILY HISTORY:  Pertinent for hypertension. IMMUNIZATIONS:  The patient had received flu vaccine the previous fall. She was up-to-date on her Prevnar 15 and also up-to-date on her  Pneumovax 23. PHYSICAL EXAMINATION:  VITAL SIGNS:  Following admission, temperature 98.1, respirations 18,  pulse 65, blood pressure 134/67, pulse ox on room air 91%. GENERAL:  The patient is a well-developed, well-nourished white female  who has a deep cough and looks mildly ill. SKIN:  No rash. HEENT:  Head was normal.  Eyes were normal.  Mucous membranes were  normal.  NECK:  Thyroid was normal.  Carotid pulses were normal without bruits. CHEST:  Wall motion was normal.  Auscultation of the lungs revealed some  rales and decreased breath sounds at the left base.   The rest of the  pulmonary exam was normal.  CARDIAC:  Regular rate and rhythm with normal heart sounds. ABDOMEN:  No mass, organomegaly, or tenderness. Bowel sounds were  present. PELVIC/RECTAL/BREAST:  Exams were deferred. EXTREMITIES:  No edema. A 1+ dorsalis pedis pulses. No acute joint  findings. NEUROLOGIC:  Examination with the patient lying in bed was normal.   There was no focal weakness. Station and gait could not be tested. IMPRESSION:  1. Community-acquired pneumonia. 2.  Exacerbation of COPD. 3.  Influenza. 4.  Hypertension. 5.  Hyperlipidemia. 6.  Nausea and vomiting, probably secondary to being ill and/or  medication. 7.  Coronary artery disease. PLAN:  Intravenous fluids. Intravenous antibiotics. We will continue  her Tamiflu now that her nausea and vomiting are better. Pulmonary  consultation. We will continue her maintenance inhalers. Anticipated  length of stay is greater than two midnights. Plan was discussed with  the patient. She is in agreement.         Heather Servin MD    D: 05/16/2019 10:53:38       T: 05/16/2019 11:06:15     [email or web address]  Job#: [id number]     Doc#: [id number]    CC:

## 2019-05-30 ENCOUNTER — SCHEDULED TELEPHONE ENCOUNTER (OUTPATIENT)
Dept: PHARMACY | Age: 74
End: 2019-05-30

## 2019-05-31 ENCOUNTER — HOSPITAL ENCOUNTER (OUTPATIENT)
Age: 74
Discharge: HOME | End: 2019-05-31
Payer: MEDICARE

## 2019-05-31 ENCOUNTER — HOSPITAL ENCOUNTER (OUTPATIENT)
Dept: GENERAL RADIOLOGY | Age: 74
Discharge: HOME | End: 2019-05-31
Payer: MEDICARE

## 2019-05-31 DIAGNOSIS — R05.9 COUGH: ICD-10-CM

## 2019-05-31 PROCEDURE — 71046 X-RAY EXAM CHEST 2 VIEWS: CPT

## 2019-06-14 ENCOUNTER — CARE COORDINATION (OUTPATIENT)
Dept: CASE MANAGEMENT | Age: 74
End: 2019-06-14

## 2019-06-14 PROBLEM — J10.1 INFLUENZA A: Status: RESOLVED | Noted: 2019-05-15 | Resolved: 2019-06-14

## 2019-06-14 NOTE — CARE COORDINATION
[facility] Transitions Follow Up Call    2019    Patient: Monica Trimble  Patient : 1945   MRN: [id number]  Reason for Admission:   Discharge Date: 19 RARS: Readmission Risk Score: 12         Spoke with: [address] Transitions Subsequent and Final Call    Subsequent and Final Calls  Do you have any ongoing symptoms?:  No  Have your medications changed?:  No  Do you have any questions related to your medications?:  No  Do you currently have any active services?:  No  Do you have any needs or concerns that I can assist you with?:  No  Identified Barriers:  None  Care Transitions Interventions  Other Interventions: Follow Up: Spoke with patient who said she is doing just great. Pt said she has her energy level back, is getting around just fine. Pt said she has all her medications and has no questions in regard to her medicine. Pt was thankful for the call today and said she doesn't need thing. Will continue to follow for bpci .    Future Appointments   Date Time Provider Juan Miguel Felipa   2019  8:20 AM Diandra Dunbar MD CARMEN PULM CC [facility]   10/2/2019 10:00 AM Natalie Bashir MD FF Cardio [facility]   2019 11:00 AM Alan De Jesus MD PULM & CC MARY Stanley RN

## 2019-07-08 ENCOUNTER — OFFICE VISIT (OUTPATIENT)
Dept: PULMONOLOGY | Age: 74
End: 2019-07-08
Payer: MEDICARE

## 2019-07-08 VITALS
DIASTOLIC BLOOD PRESSURE: 82 MMHG | TEMPERATURE: 98 F | BODY MASS INDEX: 26.16 KG/M2 | HEART RATE: 61 BPM | WEIGHT: 157 LBS | RESPIRATION RATE: 20 BRPM | OXYGEN SATURATION: 98 % | SYSTOLIC BLOOD PRESSURE: 124 MMHG | HEIGHT: 65 IN

## 2019-07-08 DIAGNOSIS — J43.2 CENTRILOBULAR EMPHYSEMA (HCC): ICD-10-CM

## 2019-07-08 DIAGNOSIS — J44.1 COPD EXACERBATION (HCC): ICD-10-CM

## 2019-07-08 DIAGNOSIS — J44.9 COPD, MODERATE (HCC): Primary | ICD-10-CM

## 2019-07-08 DIAGNOSIS — J44.9 MODERATE COPD (CHRONIC OBSTRUCTIVE PULMONARY DISEASE) (HCC): ICD-10-CM

## 2019-07-08 PROCEDURE — G8598 ASA/ANTIPLAT THER USED: HCPCS | Performed by: INTERNAL MEDICINE

## 2019-07-08 PROCEDURE — 1123F ACP DISCUSS/DSCN MKR DOCD: CPT | Performed by: INTERNAL MEDICINE

## 2019-07-08 PROCEDURE — G8419 CALC BMI OUT NRM PARAM NOF/U: HCPCS | Performed by: INTERNAL MEDICINE

## 2019-07-08 PROCEDURE — 4040F PNEUMOC VAC/ADMIN/RCVD: CPT | Performed by: INTERNAL MEDICINE

## 2019-07-08 PROCEDURE — 99213 OFFICE O/P EST LOW 20 MIN: CPT | Performed by: INTERNAL MEDICINE

## 2019-07-08 PROCEDURE — 3017F COLORECTAL CA SCREEN DOC REV: CPT | Performed by: INTERNAL MEDICINE

## 2019-07-08 PROCEDURE — 1036F TOBACCO NON-USER: CPT | Performed by: INTERNAL MEDICINE

## 2019-07-08 PROCEDURE — G8926 SPIRO NO PERF OR DOC: HCPCS | Performed by: INTERNAL MEDICINE

## 2019-07-08 PROCEDURE — G8400 PT W/DXA NO RESULTS DOC: HCPCS | Performed by: INTERNAL MEDICINE

## 2019-07-08 PROCEDURE — G8427 DOCREV CUR MEDS BY ELIG CLIN: HCPCS | Performed by: INTERNAL MEDICINE

## 2019-07-08 PROCEDURE — 3023F SPIROM DOC REV: CPT | Performed by: INTERNAL MEDICINE

## 2019-07-08 PROCEDURE — 1090F PRES/ABSN URINE INCON ASSESS: CPT | Performed by: INTERNAL MEDICINE

## 2019-07-08 NOTE — ASSESSMENT & PLAN NOTE
-Resolved following clearance of influenza pneumonia, currently not in exacerbation, no need for systemic corticosteroids at this time.

## 2019-07-08 NOTE — PROGRESS NOTES
REASON FOR CONSULTATION:  Chief Complaint   Patient presents with    Follow-Up from  Rosi Gaston at request of Danika Robert MD     PCP: Danika Robert MD    HISTORY OF PRESENT ILLNESS: Morenita Davey is a 68y.o. year old female with a history of moderate COPD, FEV1 in April 2019 was at 58%, and recent hospitalization for influenza pneumonia who presents for hospital follow-up. He completed a course of Tamiflu for her influenza, and since discharge she has had resolution of her symptoms. Follow-up chest x-ray shows complete clearing of pneumonia. Patient reports while her breathing is at baseline, and she has no cough, she has noticed easy bruising that she attributes to [facility]. She says this was never an issue prior to starting [facility] several years ago by her prior pulmonologist, and since restarting recently notices become an issue again. She takes a baby aspirin a day but distance over 30 years. Platelets in April within normal limits. She denies fever/chills, nausea vomiting diarrhea, cough, hemoptysis, lower extremity edema. Past Medical History:   Diagnosis Date    Bruising     CAD (coronary artery disease)     Cardiomyopathy ([facility] [address]     Emphysema     Hyperlipidemia     Hypertension     [facility]ocardial infarct [facility])        Past Surgical History:   Procedure Laterality Date    APPENDECTOMY      BREAST BIOPSY      CARDIAC SURGERY  2009    RCA    CHOLECYSTECTOMY      COLONOSCOPY      HYSTEROSCOPY  09/09/2016    HYSTEROSCOPY DILATATION AND CURETTAGE WITH MYOSURE    SUBCLAVIAN BYPASS GRAFT  1989    chest, 1989    TUBAL LIGATION         family history includes Cancer in her father; Emphysema in her mother; Heart Disease in her mother; Heart Failure in her mother; Liver Disease in her father. SOCIAL HISTORY:   reports that she quit smoking about 24 years ago. She has a 37.00 pack-year smoking history.  She has never used smokeless tobacco.      ALLERGIES:  Patient is allergic to darvon [propoxyphene hcl]; minocin [minocycline hcl]; and tamiflu [oseltamivir phosphate]. REVIEW OF SYSTEMS:  Constitutional: Negative for fever   HENT: Negative for sore throat  Eyes: Negative for redness   Respiratory: Negative for dyspnea, cough  Cardiovascular: Negative for chest pain  Gastrointestinal: Negative for vomiting, diarrhea   Genitourinary: Negative for hematuria   Musculoskeletal: Negative for arthralgias   Skin: Negative for rash  Neurological: Negative for syncope  Hematological: Negative for adenopathy  Psychiatric/Behavorial: Negative for anxiety    Objective:   PHYSICAL EXAM:  Blood pressure 124/82, pulse 61, temperature 98 °F (36.7 °C), temperature source Oral, resp. rate 20, height 5' 5\" (1.651 m), weight 157 lb (71.2 kg), SpO2 98 %, not currently breastfeeding.'  Gen: No distress. Eyes: PERRL. No sclera icterus. No conjunctival injection. ENT: No discharge. Pharynx clear. External appearance of ears and nose normal.  Neck: Trachea midline. No obvious mass. Resp: No accessory muscle use. No crackles. No wheezes. No rhonchi. CV: Regular rate. Regular rhythm. No murmur or rub. No edema. GI: Non-tender. Non-distended. No hernia. Skin: Warm, dry, normal texture and turgor. No nodule on exposed extremities. Lymph: No cervical LAD. No supraclavicular LAD. M/S: No cyanosis. No clubbing. No joint deformity. Neuro: Moves all four extremities. Psych: Oriented x 3. No anxiety. Awake. Alert. Intact judgement and insight.     Current Outpatient Medications   Medication Sig Dispense Refill    umeclidinium-vilanterol (ANORO ELLIPTA) 62.5-25 MCG/INH AEPB inhaler Inhale 1 puff into the lungs daily 1 puff 5    carvedilol (COREG) 12.5 MG tablet Take 1 tablet by mouth 2 times daily 180 tablet 3    lisinopril (PRINIVIL;ZESTRIL) 20 MG tablet Take 1 tablet by mouth daily 90 tablet 3    atorvastatin (LIPITOR) 40 MG tablet TAKE ONE TABLET review. Dose  modulation, iterative reconstruction, and/or weight based adjustment of the  mA/kV was utilized to reduce the radiation dose to as low as reasonably  achievable. COMPARISON:  01/25/2017    HISTORY:  ORDERING PHYSICIAN PROVIDED HISTORY: Shortness of breath  TECHNOLOGIST PROVIDED HISTORY:  Technologist Provided Reason for Exam: lll pain, abd. cxr  Acuity: Acute  Type of Encounter: Subsequent/Follow-up  Relevant Medical/Surgical History: no surg, or hx. ca    FINDINGS:  Mediastinum: Thyroid gland appears normal.  Atherosclerotic change is seen in  the aorta. Coronary artery calcification is seen. Small hiatal hernia is  seen. There is nonspecific thickening at the GE junction. .  Rim Calcified  aneurysm at the thoracic inlet is unchanged posteriorly on the right,  measuring 1.8 cm    Lungs/pleura: Linear and curvilinear opacities are seen in the apices. There  is underlying emphysema. No pneumonia. No edema. No pleural effusions    Calcified granuloma is seen in the right lower lobe. Punctate nodule right lower lobe is unchanged measuring 2-3 mm. Punctate  nodule left upper lobe is also unchanged. .  Tiny punctate nodule right  upper lobe is also unchanged    Trace pleural fluid is seen on the right.  , incidentally noted    Upper Abdomen: Mild clips are seen from prior cholecystectomy. Scattered  hypodense nodules are seen throughout the liver, measuring 1.8 cm in size or  less. , likely cyst.  Mild biliary ductal dilatation is unchanged    Soft Tissues/Bones: Axillary bypass graft is seen. Spurring is seen in the spine      Impression Mild emphysema with a few tiny punctate nodules, similar to prior. No focal  pneumonia. No pulmonary edema         CTPA: No results found for this or any previous visit.     CXR PA/LAT:   Results for orders placed during the hospital encounter of 05/31/19   XR CHEST STANDARD (2 VW)    Narrative EXAMINATION:  TWO XRAY VIEWS OF THE CHEST    5/31/2019 8:01 am    COMPARISON:  05/19/2019    HISTORY:  ORDERING SYSTEM PROVIDED HISTORY: Cough  TECHNOLOGIST PROVIDED HISTORY:  Ordering Physician Provided Reason for Exam: pt has a cough since Mother's  Day. pt states she was hospitalized with pneumonia on 05/15  Acuity: Acute  Type of Exam: Subsequent/Follow-up  Relevant Medical/Surgical History: asthma and COPD    FINDINGS:  Cardiac silhouette, mediastinal hilar contours are normal.  There is no focal  airspace disease or pleural effusion. No pneumothorax is identified. Surgical staples project over the infraclavicular regions, consistent with  previous bypass grafts. Impression No acute cardiopulmonary disease. CXR portable:   Results for orders placed during the hospital encounter of 05/15/19   XR CHEST PORTABLE    Narrative EXAMINATION:  ONE XRAY VIEW OF THE CHEST    5/15/2019 10:10 am    COMPARISON:  12/29/2017    HISTORY:  ORDERING SYSTEM PROVIDED HISTORY: chest tightness  TECHNOLOGIST PROVIDED HISTORY:  Reason for exam:->chest tightness  Ordering Physician Provided Reason for Exam: Influenza (dx yesterday with flu  has been taking tamiflu since yesterday )  Acuity: Acute  Type of Exam: Ongoing    FINDINGS:  Cardiac leads project over the chest.  Hazy opacity seen in the left base  with decreased definition of the lateral left hemidiaphragm. No gross  pneumothorax. Cardiac and mediastinal silhouettes are similar to prior. Right upper quadrant clips. Rim calcification is seen at the soft tissues of  the left lateral chest wall. Clips are seen projecting over the left apex. Impression Minimal left basilar atelectasis or pneumonitis. Pulmonary function testing  Moderate Obstruction with FEV1 58%    Assessment/Plan:       Diagnosis Orders   1. COPD, moderate (HCC)  umeclidinium-vilanterol (ANORO ELLIPTA) 62.5-25 MCG/INH AEPB inhaler   2. COPD exacerbation ([address]     3. Centrilobular emphysema ([address]     4.  Moderate COPD (chronic

## 2019-07-10 ENCOUNTER — CARE COORDINATION (OUTPATIENT)
Dept: CASE MANAGEMENT | Age: 74
End: 2019-07-10

## 2019-07-31 ENCOUNTER — CARE COORDINATION (OUTPATIENT)
Dept: CASE MANAGEMENT | Age: 74
End: 2019-07-31

## 2019-08-17 ENCOUNTER — HOSPITAL ENCOUNTER (EMERGENCY)
Age: 74
Discharge: HOME | End: 2019-08-17
Attending: EMERGENCY MEDICINE
Payer: MEDICARE

## 2019-08-17 VITALS
SYSTOLIC BLOOD PRESSURE: 145 MMHG | HEIGHT: 65 IN | HEART RATE: 68 BPM | BODY MASS INDEX: 27.62 KG/M2 | TEMPERATURE: 98 F | DIASTOLIC BLOOD PRESSURE: 63 MMHG | RESPIRATION RATE: 17 BRPM | WEIGHT: 165.79 LBS | OXYGEN SATURATION: 96 %

## 2019-08-17 DIAGNOSIS — S41.111A SKIN TEAR OF RIGHT UPPER EXTREMITY: Primary | ICD-10-CM

## 2019-08-17 PROCEDURE — 99283 EMERGENCY DEPT VISIT LOW MDM: CPT

## 2019-08-17 ASSESSMENT — PAIN DESCRIPTION - DESCRIPTORS: DESCRIPTORS: ACHING

## 2019-08-17 ASSESSMENT — PAIN DESCRIPTION - ORIENTATION: ORIENTATION: RIGHT

## 2019-08-17 ASSESSMENT — PAIN DESCRIPTION - FREQUENCY: FREQUENCY: INTERMITTENT

## 2019-08-17 ASSESSMENT — PAIN SCALES - GENERAL
PAINLEVEL_OUTOF10: 3
PAINLEVEL_OUTOF10: 7

## 2019-08-17 ASSESSMENT — PAIN DESCRIPTION - PROGRESSION: CLINICAL_PROGRESSION: NOT CHANGED

## 2019-08-17 ASSESSMENT — PAIN - FUNCTIONAL ASSESSMENT: PAIN_FUNCTIONAL_ASSESSMENT: ACTIVITIES ARE NOT PREVENTED

## 2019-08-17 ASSESSMENT — PAIN DESCRIPTION - ONSET: ONSET: SUDDEN

## 2019-08-17 ASSESSMENT — PAIN DESCRIPTION - LOCATION: LOCATION: ARM

## 2019-08-17 ASSESSMENT — PAIN DESCRIPTION - PAIN TYPE: TYPE: ACUTE PAIN

## 2019-08-17 NOTE — ED PROVIDER NOTES
[id number] [facility] Emergency Department      Pt Name: Henok Maya  MRN: [id number]  [place] 1945  Date of evaluation: 8/17/2019  Provider: Elena Vital MD  CHIEF COMPLAINT  Chief Complaint   Patient presents with    Laceration     Pt to ED with right arm laceration. States she hit her arm on a door. HPI  Henok Maya is a 69 y.o. female who presents because of a skin tear. She hit her arm against a door frame, causing the tear just prior to arrival.  She was concerned because she got lightheaded while trying to care for the wound and it continued to bleed. She takes daily low-dose aspirin. Denies any other injury. Believes her tetanus status is up-to-date. REVIEW OF SYSTEMS:  No lightheadedness now, right arm local pain, no numbness See HPI for further details. Remainder of pertinent ROS reviewed and negative. Nursing notes reviewed. PAST MEDICAL HISTORY  Past Medical History:   Diagnosis Date    Bruising     CAD (coronary artery disease)     Cardiomyopathy ([facility] [address]     Emphysema     Hyperlipidemia     Hypertension     Myocardial infarct [facility])      SURGICAL HISTORY  Past Surgical History:   Procedure Laterality Date    APPENDECTOMY      BREAST BIOPSY      CARDIAC SURGERY  2009    RCA    CHOLECYSTECTOMY      COLONOSCOPY      HYSTEROSCOPY  09/09/2016    HYSTEROSCOPY DILATATION AND CURETTAGE WITH MYOSURE    SUBCLAVIAN BYPASS GRAFT  1989    chest, 1989    TUBAL LIGATION       MEDICATIONS:  No current facility-administered medications on file prior to encounter.       Current Outpatient Medications on File Prior to Encounter   Medication Sig Dispense Refill    umeclidinium-vilanterol (ANORO ELLIPTA) 62.5-25 MCG/INH AEPB inhaler Inhale 1 puff into the lungs daily 1 puff 0    carvedilol (COREG) 12.5 MG tablet Take 1 tablet by mouth 2 times daily 180 tablet 3    oseltamivir (TAMIFLU) 75 MG capsule Take 75 mg by mouth 2 times daily      lisinopril (PRINIVIL;ZESTRIL) 20 MG

## 2019-09-06 LAB
A/G RATIO: 2 (ref 1.1–2.2)
ALBUMIN SERPL-MCNC: 4.4 G/DL (ref 3.4–5)
ALP BLD-CCNC: 71 U/L (ref 40–129)
ALT SERPL-CCNC: 16 U/L (ref 10–40)
ANION GAP SERPL CALCULATED.3IONS-SCNC: 12 MMOL/L (ref 3–16)
AST SERPL-CCNC: 23 U/L (ref 15–37)
BILIRUB SERPL-MCNC: 0.6 MG/DL (ref 0–1)
BUN BLDV-MCNC: 13 MG/DL (ref 7–20)
CALCIUM SERPL-MCNC: 9.7 MG/DL (ref 8.3–10.6)
CHLORIDE BLD-SCNC: 99 MMOL/L (ref 99–110)
CHOLESTEROL, FASTING: 143 MG/DL (ref 0–199)
CO2: 28 MMOL/L (ref 21–32)
CREAT SERPL-MCNC: 0.6 MG/DL (ref 0.6–1.2)
CREATININE URINE: 118 MG/DL (ref 28–259)
ESTIMATED AVERAGE GLUCOSE: 108.3 MG/DL
GFR AFRICAN AMERICAN: >60
GFR NON-AFRICAN AMERICAN: >60
GLOBULIN: 2.2 G/DL
GLUCOSE BLD-MCNC: 96 MG/DL (ref 70–99)
HBA1C MFR BLD: 5.4 %
HCT VFR BLD CALC: 38.9 % (ref 36–48)
HDLC SERPL-MCNC: 65 MG/DL (ref 40–60)
HEMOGLOBIN: 13.2 G/DL (ref 12–16)
LDL CHOLESTEROL CALCULATED: 61 MG/DL
MCH RBC QN AUTO: 31.7 PG (ref 26–34)
MCHC RBC AUTO-ENTMCNC: 34 G/DL (ref 31–36)
MCV RBC AUTO: 93.3 FL (ref 80–100)
MICROALBUMIN UR-MCNC: 1.7 MG/DL
MICROALBUMIN/CREAT UR-RTO: 14.4 MG/G (ref 0–30)
PDW BLD-RTO: 12.5 % (ref 12.4–15.4)
PLATELET # BLD: 252 K/UL (ref 135–450)
PMV BLD AUTO: 6.9 FL (ref 5–10.5)
POTASSIUM SERPL-SCNC: 4.7 MMOL/L (ref 3.5–5.1)
RBC # BLD: 4.17 M/UL (ref 4–5.2)
SODIUM BLD-SCNC: 139 MMOL/L (ref 136–145)
TOTAL PROTEIN: 6.6 G/DL (ref 6.4–8.2)
TRIGLYCERIDE, FASTING: 85 MG/DL (ref 0–150)
TSH SERPL DL<=0.05 MIU/L-ACNC: 2.46 UIU/ML (ref 0.27–4.2)
VLDLC SERPL CALC-MCNC: 17 MG/DL
WBC # BLD: 3.6 K/UL (ref 4–11)

## 2019-09-17 ENCOUNTER — HOSPITAL ENCOUNTER (OUTPATIENT)
Dept: WOMENS IMAGING | Age: 74
Discharge: HOME | End: 2019-09-17
Payer: MEDICARE

## 2019-09-17 DIAGNOSIS — Z12.39 BREAST CANCER SCREENING: ICD-10-CM

## 2019-09-17 PROCEDURE — 77067 SCR MAMMO BI INCL CAD: CPT

## 2019-09-18 ENCOUNTER — CASE MANAGEMENT (OUTPATIENT)
Dept: WOMENS IMAGING | Age: 74
End: 2019-09-18

## 2019-09-23 ENCOUNTER — HOSPITAL ENCOUNTER (OUTPATIENT)
Dept: WOMENS IMAGING | Age: 74
Discharge: HOME | End: 2019-09-23
Payer: MEDICARE

## 2019-09-23 DIAGNOSIS — R92.8 ABNORMAL MAMMOGRAM: ICD-10-CM

## 2019-09-23 PROCEDURE — G0279 TOMOSYNTHESIS, MAMMO: HCPCS

## 2019-09-24 ENCOUNTER — CASE MANAGEMENT (OUTPATIENT)
Dept: WOMENS IMAGING | Age: 74
End: 2019-09-24

## 2019-09-24 NOTE — PROGRESS NOTES
Dr. Yolanda Koehler spoke to patient regarding recommendation for breast biopsy. Patient was unable to stay to speak with nurse navigator to discuss biopsy. Patient later called and RN and patient discussed medical history, allergies, and current medication list. Biopsy procedure explained to patient. Patient denies any further questions at this time. Doctor's office was called and report was faxed. Requesting order for biopsy from MD at this time.

## 2019-10-02 ENCOUNTER — OFFICE VISIT (OUTPATIENT)
Dept: CARDIOLOGY CLINIC | Age: 74
End: 2019-10-02
Payer: MEDICARE

## 2019-10-02 VITALS
SYSTOLIC BLOOD PRESSURE: 120 MMHG | DIASTOLIC BLOOD PRESSURE: 80 MMHG | OXYGEN SATURATION: 97 % | WEIGHT: 159.7 LBS | HEART RATE: 60 BPM | HEIGHT: 65 IN | BODY MASS INDEX: 26.61 KG/M2

## 2019-10-02 DIAGNOSIS — E78.2 MIXED HYPERLIPIDEMIA: Chronic | ICD-10-CM

## 2019-10-02 DIAGNOSIS — I42.9 CARDIOMYOPATHY, UNSPECIFIED TYPE (HCC): Chronic | ICD-10-CM

## 2019-10-02 DIAGNOSIS — I10 ESSENTIAL HYPERTENSION: ICD-10-CM

## 2019-10-02 DIAGNOSIS — I25.10 ATHEROSCLEROSIS OF NATIVE CORONARY ARTERY OF NATIVE HEART WITHOUT ANGINA PECTORIS: Primary | Chronic | ICD-10-CM

## 2019-10-02 PROCEDURE — 99214 OFFICE O/P EST MOD 30 MIN: CPT | Performed by: INTERNAL MEDICINE

## 2019-10-02 PROCEDURE — 1090F PRES/ABSN URINE INCON ASSESS: CPT | Performed by: INTERNAL MEDICINE

## 2019-10-02 PROCEDURE — G8419 CALC BMI OUT NRM PARAM NOF/U: HCPCS | Performed by: INTERNAL MEDICINE

## 2019-10-02 PROCEDURE — G8427 DOCREV CUR MEDS BY ELIG CLIN: HCPCS | Performed by: INTERNAL MEDICINE

## 2019-10-02 PROCEDURE — 3017F COLORECTAL CA SCREEN DOC REV: CPT | Performed by: INTERNAL MEDICINE

## 2019-10-02 PROCEDURE — 1123F ACP DISCUSS/DSCN MKR DOCD: CPT | Performed by: INTERNAL MEDICINE

## 2019-10-02 PROCEDURE — 4040F PNEUMOC VAC/ADMIN/RCVD: CPT | Performed by: INTERNAL MEDICINE

## 2019-10-02 PROCEDURE — 1036F TOBACCO NON-USER: CPT | Performed by: INTERNAL MEDICINE

## 2019-10-02 PROCEDURE — G8598 ASA/ANTIPLAT THER USED: HCPCS | Performed by: INTERNAL MEDICINE

## 2019-10-02 PROCEDURE — G8484 FLU IMMUNIZE NO ADMIN: HCPCS | Performed by: INTERNAL MEDICINE

## 2019-10-02 PROCEDURE — G8400 PT W/DXA NO RESULTS DOC: HCPCS | Performed by: INTERNAL MEDICINE

## 2019-10-02 RX ORDER — LISINOPRIL 20 MG/1
20 TABLET ORAL DAILY
Qty: 90 TABLET | Refills: 4 | Status: SHIPPED | OUTPATIENT
Start: 2019-10-02 | End: 2020-05-07 | Stop reason: SDUPTHER

## 2019-10-02 RX ORDER — ATORVASTATIN CALCIUM 40 MG/1
TABLET, FILM COATED ORAL
Qty: 90 TABLET | Refills: 4 | Status: SHIPPED | OUTPATIENT
Start: 2019-10-02 | End: 2020-06-01 | Stop reason: SDUPTHER

## 2019-10-08 ENCOUNTER — HOSPITAL ENCOUNTER (OUTPATIENT)
Dept: WOMENS IMAGING | Age: 74
Discharge: HOME | End: 2019-10-08
Payer: MEDICARE

## 2019-10-08 VITALS — SYSTOLIC BLOOD PRESSURE: 150 MMHG | HEART RATE: 62 BPM | DIASTOLIC BLOOD PRESSURE: 65 MMHG | OXYGEN SATURATION: 95 %

## 2019-10-08 DIAGNOSIS — R92.8 ABNORMAL MAMMOGRAM: ICD-10-CM

## 2019-10-08 PROCEDURE — 88305 TISSUE EXAM BY PATHOLOGIST: CPT

## 2019-10-08 PROCEDURE — 19081 BX BREAST 1ST LESION STRTCTC: CPT

## 2019-10-08 PROCEDURE — 77065 DX MAMMO INCL CAD UNI: CPT

## 2019-10-08 PROCEDURE — 76098 X-RAY EXAM SURGICAL SPECIMEN: CPT

## 2019-10-08 ASSESSMENT — PAIN - FUNCTIONAL ASSESSMENT
PAIN_FUNCTIONAL_ASSESSMENT: 0-10
PAIN_FUNCTIONAL_ASSESSMENT: 0-10

## 2019-10-10 ENCOUNTER — CASE MANAGEMENT (OUTPATIENT)
Dept: WOMENS IMAGING | Age: 74
End: 2019-10-10

## 2019-10-11 ENCOUNTER — CASE MANAGEMENT (OUTPATIENT)
Dept: WOMENS IMAGING | Age: 74
End: 2019-10-11

## 2019-12-16 ENCOUNTER — OFFICE VISIT (OUTPATIENT)
Dept: PULMONOLOGY | Age: 74
End: 2019-12-16
Payer: MEDICARE

## 2019-12-16 VITALS
WEIGHT: 164 LBS | TEMPERATURE: 98 F | BODY MASS INDEX: 27.32 KG/M2 | RESPIRATION RATE: 16 BRPM | HEIGHT: 65 IN | HEART RATE: 58 BPM | OXYGEN SATURATION: 98 % | DIASTOLIC BLOOD PRESSURE: 72 MMHG | SYSTOLIC BLOOD PRESSURE: 112 MMHG

## 2019-12-16 DIAGNOSIS — Z72.0 TOBACCO ABUSE: ICD-10-CM

## 2019-12-16 DIAGNOSIS — J44.9 COPD, MODERATE (HCC): ICD-10-CM

## 2019-12-16 DIAGNOSIS — J43.2 CENTRILOBULAR EMPHYSEMA (HCC): Primary | ICD-10-CM

## 2019-12-16 DIAGNOSIS — J44.9 MODERATE COPD (CHRONIC OBSTRUCTIVE PULMONARY DISEASE) (HCC): ICD-10-CM

## 2019-12-16 PROCEDURE — 4040F PNEUMOC VAC/ADMIN/RCVD: CPT | Performed by: INTERNAL MEDICINE

## 2019-12-16 PROCEDURE — G9899 SCRN MAM PERF RSLTS DOC: HCPCS | Performed by: INTERNAL MEDICINE

## 2019-12-16 PROCEDURE — 1036F TOBACCO NON-USER: CPT | Performed by: INTERNAL MEDICINE

## 2019-12-16 PROCEDURE — G8926 SPIRO NO PERF OR DOC: HCPCS | Performed by: INTERNAL MEDICINE

## 2019-12-16 PROCEDURE — G8400 PT W/DXA NO RESULTS DOC: HCPCS | Performed by: INTERNAL MEDICINE

## 2019-12-16 PROCEDURE — G8427 DOCREV CUR MEDS BY ELIG CLIN: HCPCS | Performed by: INTERNAL MEDICINE

## 2019-12-16 PROCEDURE — G8417 CALC BMI ABV UP PARAM F/U: HCPCS | Performed by: INTERNAL MEDICINE

## 2019-12-16 PROCEDURE — 3017F COLORECTAL CA SCREEN DOC REV: CPT | Performed by: INTERNAL MEDICINE

## 2019-12-16 PROCEDURE — 1090F PRES/ABSN URINE INCON ASSESS: CPT | Performed by: INTERNAL MEDICINE

## 2019-12-16 PROCEDURE — 99213 OFFICE O/P EST LOW 20 MIN: CPT | Performed by: INTERNAL MEDICINE

## 2019-12-16 PROCEDURE — 3023F SPIROM DOC REV: CPT | Performed by: INTERNAL MEDICINE

## 2019-12-16 PROCEDURE — 1123F ACP DISCUSS/DSCN MKR DOCD: CPT | Performed by: INTERNAL MEDICINE

## 2019-12-16 PROCEDURE — G8484 FLU IMMUNIZE NO ADMIN: HCPCS | Performed by: INTERNAL MEDICINE

## 2019-12-16 PROCEDURE — G8598 ASA/ANTIPLAT THER USED: HCPCS | Performed by: INTERNAL MEDICINE

## 2020-02-25 ENCOUNTER — TELEPHONE (OUTPATIENT)
Dept: PULMONOLOGY | Age: 75
End: 2020-02-25

## 2020-02-25 NOTE — TELEPHONE ENCOUNTER
Patient said that Advair is $183  She will try to stick with Anoro since we don't have samples of the Advair. She plans to discuss with Dr. Manoj Rankin at next visit.

## 2020-03-17 ENCOUNTER — NURSE TRIAGE (OUTPATIENT)
Dept: OTHER | Facility: CLINIC | Age: 75
End: 2020-03-17

## 2020-03-17 NOTE — TELEPHONE ENCOUNTER
Reason for Disposition   Known COPD or other severe lung disease (i.e., bronchiectasis, cystic fibrosis, lung surgery) and worsening symptoms (i.e., increased sputum purulence or amount, increased breathing difficulty)    Protocols used: COUGH-ADULT-OH    Caller states she has a history of COPD, has a cough and chills, temperature of 99.3. states she does not have chest pain when not coughing. Given disposition recommendation. Received call from [address]. Call soft transferred to [address] to schedule appointment.

## 2020-05-07 ENCOUNTER — TELEPHONE (OUTPATIENT)
Dept: CARDIOLOGY CLINIC | Age: 75
End: 2020-05-07

## 2020-05-07 RX ORDER — LISINOPRIL 20 MG/1
20 TABLET ORAL DAILY
Qty: 90 TABLET | Refills: 4 | Status: SHIPPED | OUTPATIENT
Start: 2020-05-07 | End: 2020-08-14 | Stop reason: SDUPTHER

## 2020-06-01 ENCOUNTER — OFFICE VISIT (OUTPATIENT)
Dept: CARDIOLOGY CLINIC | Age: 75
End: 2020-06-01
Payer: MEDICARE

## 2020-06-01 VITALS
DIASTOLIC BLOOD PRESSURE: 86 MMHG | BODY MASS INDEX: 26.51 KG/M2 | WEIGHT: 159.1 LBS | OXYGEN SATURATION: 98 % | RESPIRATION RATE: 18 BRPM | HEIGHT: 65 IN | SYSTOLIC BLOOD PRESSURE: 128 MMHG | HEART RATE: 57 BPM

## 2020-06-01 PROCEDURE — 99214 OFFICE O/P EST MOD 30 MIN: CPT | Performed by: INTERNAL MEDICINE

## 2020-06-01 PROCEDURE — G8417 CALC BMI ABV UP PARAM F/U: HCPCS | Performed by: INTERNAL MEDICINE

## 2020-06-01 PROCEDURE — 1123F ACP DISCUSS/DSCN MKR DOCD: CPT | Performed by: INTERNAL MEDICINE

## 2020-06-01 PROCEDURE — 3017F COLORECTAL CA SCREEN DOC REV: CPT | Performed by: INTERNAL MEDICINE

## 2020-06-01 PROCEDURE — 1036F TOBACCO NON-USER: CPT | Performed by: INTERNAL MEDICINE

## 2020-06-01 PROCEDURE — 1090F PRES/ABSN URINE INCON ASSESS: CPT | Performed by: INTERNAL MEDICINE

## 2020-06-01 PROCEDURE — G8400 PT W/DXA NO RESULTS DOC: HCPCS | Performed by: INTERNAL MEDICINE

## 2020-06-01 PROCEDURE — G9899 SCRN MAM PERF RSLTS DOC: HCPCS | Performed by: INTERNAL MEDICINE

## 2020-06-01 PROCEDURE — G8427 DOCREV CUR MEDS BY ELIG CLIN: HCPCS | Performed by: INTERNAL MEDICINE

## 2020-06-01 PROCEDURE — 4040F PNEUMOC VAC/ADMIN/RCVD: CPT | Performed by: INTERNAL MEDICINE

## 2020-06-01 RX ORDER — ATORVASTATIN CALCIUM 40 MG/1
TABLET, FILM COATED ORAL
Qty: 90 TABLET | Refills: 4 | Status: SHIPPED | OUTPATIENT
Start: 2020-06-01 | End: 2020-12-16

## 2020-06-02 ENCOUNTER — TELEPHONE (OUTPATIENT)
Dept: CARDIOLOGY CLINIC | Age: 75
End: 2020-06-02

## 2020-06-02 ENCOUNTER — HOSPITAL ENCOUNTER (OUTPATIENT)
Dept: NON INVASIVE DIAGNOSTICS | Age: 75
Discharge: HOME | End: 2020-06-02
Payer: MEDICARE

## 2020-06-02 ENCOUNTER — OFFICE VISIT (OUTPATIENT)
Dept: PRIMARY CARE CLINIC | Age: 75
End: 2020-06-02

## 2020-06-02 LAB
LV EF: 50 %
LVEF MODALITY: NORMAL

## 2020-06-02 PROCEDURE — 78452 HT MUSCLE IMAGE SPECT MULT: CPT | Performed by: INTERNAL MEDICINE

## 2020-06-02 PROCEDURE — 6360000002 HC RX W HCPCS: Performed by: INTERNAL MEDICINE

## 2020-06-02 PROCEDURE — 93017 CV STRESS TEST TRACING ONLY: CPT | Performed by: INTERNAL MEDICINE

## 2020-06-02 PROCEDURE — 3430000000 HC RX DIAGNOSTIC RADIOPHARMACEUTICAL: Performed by: INTERNAL MEDICINE

## 2020-06-02 PROCEDURE — A9502 TC99M TETROFOSMIN: HCPCS | Performed by: INTERNAL MEDICINE

## 2020-06-02 RX ORDER — AMINOPHYLLINE DIHYDRATE 25 MG/ML
100 INJECTION, SOLUTION INTRAVENOUS ONCE
Status: COMPLETED | OUTPATIENT
Start: 2020-06-02 | End: 2020-06-02

## 2020-06-02 RX ADMIN — REGADENOSON 0.4 MG: 0.08 INJECTION, SOLUTION INTRAVENOUS at 09:00

## 2020-06-02 RX ADMIN — TETROFOSMIN 30 MILLICURIE: 1.38 INJECTION, POWDER, LYOPHILIZED, FOR SOLUTION INTRAVENOUS at 08:59

## 2020-06-02 RX ADMIN — TETROFOSMIN 10 MILLICURIE: 1.38 INJECTION, POWDER, LYOPHILIZED, FOR SOLUTION INTRAVENOUS at 07:59

## 2020-06-02 RX ADMIN — AMINOPHYLLINE DIHYDRATE 100 MG: 25 INJECTION, SOLUTION INTRAVENOUS at 09:01

## 2020-06-02 NOTE — PATIENT INSTRUCTIONS
Steps to help prevent the spread of COVID-19 if you are sick  SOURCE - https://stallings-combs.info/. html     Stay home except to get medical care   ; Stay home: People who are mildly ill with COVID-19 are able to isolate at home during their illness. You should restrict activities outside your home, except for getting medical care.   ; Avoid public areas: Do not go to work, school, or public areas.   ; Avoid public transportation: Avoid using public transportation, ride-sharing, or taxis.  ; Separate yourself from other people and animals in your home   ; Stay away from others: As much as possible, you should stay in a specific room and away from other people in your home. Also, you should use a separate bathroom, if available.   ; Limit contact with pets & animals: You should restrict contact with pets and other animals while you are sick with COVID-19, just like you would around other people. Although there have not been reports of pets or other animals becoming sick with COVID-19, it is still recommended that people sick with COVID-19 limit contact with animals until more information is known about the virus. ; When possible, have another member of your household care for your animals while you are sick. If you are sick with COVID-19, avoid contact with your pet, including petting, snuggling, being kissed or licked, and sharing food. If you must care for your pet or be around animals while you are sick, wash your hands before and after you interact with pets and wear a facemask. See COVID-19 and Animals for more information. Other considerations   The ill person should eat/be fed in their room if possible. Non-disposable  items used should be handled with gloves and washed with hot water or in a . Clean hands after handling used  items.  If possible, dedicate a lined trash can for the ill person.  Use gloves when removing garbage bags, handling, and disposing of trash. Wash hands after handling or disposing of trash.  Consider consulting with your local health department about trash disposal guidance if available. Information for Household Members and Caregivers of Someone who is Sick   Call ahead before visiting your doctor   Call ahead: If you have a medical appointment, call the healthcare provider and tell them that you have or may have COVID-19. This will help the healthcare provider's office take steps to keep other people from getting infected or exposed. Wear a facemask if you are sick   ; If you are sick: You should wear a facemask when you are around other people (e.g., sharing a room or vehicle) or pets and before you enter a healthcare provider's office. ; If you are caring for others: If the person who is sick is not able to wear a facemask (for example, because it causes trouble breathing), then people who live with the person who is sick should not stay in the same room with them, or they should wear a facemask if they enter a room with the person who is sick. Cover your coughs and sneezes   ; Cover: Cover your mouth and nose with a tissue when you cough or sneeze.   ; Dispose: Throw used tissues in a lined trash can.   ; Wash hands: Immediately wash your hands with soap and water for at least 20 seconds or, if soap and water are not available, clean your hands with an alcohol-based hand  that contains at least 60% alcohol. Clean your hands often   ;  Wash hands: Wash your hands often with soap and water for at least 20 seconds, especially after blowing your nose, coughing, or sneezing; going to the bathroom; and before eating or preparing food.   ; Hand : If soap and water are not readily available, use an alcohol-based hand  with at least 60% alcohol, covering all surfaces of your hands and rubbing them together until they feel dry.   ; Soap and water: Soap and water are the best option if hands are visibly dirty.   ; Avoid touching: Avoid touching your eyes, nose, and mouth with unwashed hands. Handwashing Tips   ; Wet your hands with clean, running water (warm or cold), turn off the tap, and apply soap.  ; Lather your hands by rubbing them together with the soap. Lather the backs of your hands, between your fingers, and under your nails. ; Scrub your hands for at least 20 seconds. Need a timer? Hum the [place] from beginning to end twice.  ; Rinse your hands well under clean, running water.  ; Dry your hands using a clean towel or air dry them. Avoid sharing personal household items   ; Do not share: You should not share dishes, drinking glasses, cups, eating utensils, towels, or bedding with other people or pets in your home.   ; Wash thoroughly after use: After using these items, they should be washed thoroughly with soap and water. Clean all high-touch surfaces everyday   ; Clean and disinfect: Practice routine cleaning of high touch surfaces.  ; High touch surfaces include counters, tabletops, doorknobs, bathroom fixtures, toilets, phones, keyboards, tablets, and bedside tables.  ; Disinfect areas with bodily fluids: Also, clean any surfaces that may have blood, stool, or body fluids on them.   ; Household : Use a household cleaning spray or wipe, according to the label instructions. Labels contain instructions for safe and effective use of the cleaning product including precautions you should take when applying the product, such as wearing gloves and making sure you have good ventilation during use of the product.     Monitor your symptoms   Seek medical attention: Seek prompt medical attention if your illness is worsening     (e.g., difficulty breathing).   ; Call your doctor: Before seeking care, call your healthcare provider and tell them that you have, or are being evaluated for, COVID-19.   ; Wear a facemask when sick: Put on a facemask before you enter the facility. These steps will help the healthcare provider's office to keep other people in the office or waiting room from getting infected or exposed. ; Alert health department: Ask your healthcare provider to call the local or state health department. Persons who are placed under active monitoring or facilitated self-monitoring should follow instructions provided by their local health department or occupational health professionals, as appropriate.  ; Call 911 if you have a medical emergency: If you have a medical emergency and need to call 911, notify the dispatch personnel that you have, or are being evaluated for COVID-19. If possible, put on a facemask before emergency medical services arrive. Thank you for enrolling in [address] [facility]. Please follow the instructions below to securely access your online medical record. [employer] allows you to send messages to your doctor, view your test results, renew your prescriptions, schedule appointments, and more. How Do I Sign Up? 1. In your Internet browser, go to [email or web address]  2. Click on the Sign Up Now link in the Sign In box. You will see the New Member Sign Up page. 3. Enter your [employer] Access Code exactly as it appears below. You will not need to use this code after youve completed the sign-up process. If you do not sign up before the expiration date, you must request a new code. MyChart Access Code: Activation code not generated  Current [employer] Status: Patient Declined    4. Enter your Social Security Number ([id number]) and Date of Birth (mm/dd/yyyy) as indicated and click Submit. You will be taken to the next sign-up page. 5. Create a Tech[employer] ID. This will be your [employer] login ID and cannot be changed, so think of one that is secure and easy to remember. 6. Create a [employer] password. You can change your password at any time. 7. Enter your Password Reset Question and Answer.  This can be used at a later time if you forget your password. 8. Enter your e-mail address. You will receive e-mail notification when new information is available in [address] 9. Click Sign Up. You can now view your medical record. Additional Information  If you have questions, please contact your physician practice where you receive care. Remember, MyChart is NOT to be used for urgent needs. For medical emergencies, dial 911.

## 2020-06-04 LAB
SARS-COV-2: NOT DETECTED
SOURCE: NORMAL

## 2020-06-08 ENCOUNTER — OFFICE VISIT (OUTPATIENT)
Dept: PULMONOLOGY | Age: 75
End: 2020-06-08
Payer: MEDICARE

## 2020-06-08 VITALS
TEMPERATURE: 97.6 F | DIASTOLIC BLOOD PRESSURE: 80 MMHG | BODY MASS INDEX: 26.16 KG/M2 | WEIGHT: 157 LBS | HEIGHT: 65 IN | OXYGEN SATURATION: 99 % | HEART RATE: 57 BPM | SYSTOLIC BLOOD PRESSURE: 122 MMHG | RESPIRATION RATE: 16 BRPM

## 2020-06-08 PROBLEM — J18.9 CAP (COMMUNITY ACQUIRED PNEUMONIA): Status: RESOLVED | Noted: 2019-05-15 | Resolved: 2020-06-08

## 2020-06-08 PROBLEM — J16.0 CAP (COMMUNITY ACQUIRED PNEUMONIA) DUE TO CHLAMYDIA SPECIES: Status: RESOLVED | Noted: 2019-05-15 | Resolved: 2020-06-08

## 2020-06-08 PROCEDURE — G8417 CALC BMI ABV UP PARAM F/U: HCPCS | Performed by: INTERNAL MEDICINE

## 2020-06-08 PROCEDURE — G9899 SCRN MAM PERF RSLTS DOC: HCPCS | Performed by: INTERNAL MEDICINE

## 2020-06-08 PROCEDURE — 4040F PNEUMOC VAC/ADMIN/RCVD: CPT | Performed by: INTERNAL MEDICINE

## 2020-06-08 PROCEDURE — 1090F PRES/ABSN URINE INCON ASSESS: CPT | Performed by: INTERNAL MEDICINE

## 2020-06-08 PROCEDURE — 3017F COLORECTAL CA SCREEN DOC REV: CPT | Performed by: INTERNAL MEDICINE

## 2020-06-08 PROCEDURE — G8926 SPIRO NO PERF OR DOC: HCPCS | Performed by: INTERNAL MEDICINE

## 2020-06-08 PROCEDURE — 1036F TOBACCO NON-USER: CPT | Performed by: INTERNAL MEDICINE

## 2020-06-08 PROCEDURE — 3023F SPIROM DOC REV: CPT | Performed by: INTERNAL MEDICINE

## 2020-06-08 PROCEDURE — 1123F ACP DISCUSS/DSCN MKR DOCD: CPT | Performed by: INTERNAL MEDICINE

## 2020-06-08 PROCEDURE — G8427 DOCREV CUR MEDS BY ELIG CLIN: HCPCS | Performed by: INTERNAL MEDICINE

## 2020-06-08 PROCEDURE — 99213 OFFICE O/P EST LOW 20 MIN: CPT | Performed by: INTERNAL MEDICINE

## 2020-06-08 PROCEDURE — G8400 PT W/DXA NO RESULTS DOC: HCPCS | Performed by: INTERNAL MEDICINE

## 2020-06-08 NOTE — ASSESSMENT & PLAN NOTE
-Symptoms well controlled on daily Anoro, but cost will become prohibitively expensive when she hits her gap.  -We will trial wixela following this month of Anoro and see if symptoms remain stable.

## 2020-06-08 NOTE — PROGRESS NOTES
REASON FOR CONSULTATION:  Chief Complaint   Patient presents with    Follow-up     copd         Consult at request of Mason Kidd MD     PCP: Mason Kidd MD    HISTORY OF PRESENT ILLNESS: Luis A Elias is a 69y.o. year old female with a history of moderate COPD, FEV1 58%, here for f/u. Abnormal stress test, plan for left heart cath tomorrow. Moderate COPD/Centrilobular emphysema - on Anoro Daily, but is very expensive. Mild CONNOR, has previously not tolerated ICS due to easy bruising she states. One exacerbation in 2019 due to influenza. Had pneumovax x1. Tobacco abuse former smoking. Quit ~25ya. No nodules on LDCT 4/2019 no longer getting lung cancer screening. Past Medical History:   Diagnosis Date    Bruising     CAD (coronary artery disease)     Cardiomyopathy ([address]     Emphysema     Hyperlipidemia     Hypertension     Myocardial infarct [facility])        Past Surgical History:   Procedure Laterality Date    APPENDECTOMY      BREAST BIOPSY      CARDIAC SURGERY  2009    RCA    CHOLECYSTECTOMY      COLONOSCOPY      HYSTEROSCOPY  09/09/2016    HYSTEROSCOPY DILATATION AND CURETTAGE WITH MYOSURE    SUBCLAVIAN BYPASS GRAFT  1989    chest, 1989    TUBAL LIGATION         family history includes Cancer in her father; Emphysema in her mother; Heart Disease in her mother; Heart Failure in her mother; Liver Disease in her father. SOCIAL HISTORY:   reports that she quit smoking about 25 years ago. She has a 37.00 pack-year smoking history. She has never used smokeless tobacco.      ALLERGIES:  Patient is allergic to darvon [propoxyphene hcl]; minocin [minocycline hcl]; minocycline; propoxyphene; oseltamivir; and tamiflu [oseltamivir phosphate].     REVIEW OF SYSTEMS:  Constitutional: Negative for fever   HENT: Negative for sore throat  Eyes: Negative for redness   Respiratory: +dyspnea on exertion, -cough  Cardiovascular: Negative for chest pain  Gastrointestinal:

## 2020-06-09 ENCOUNTER — APPOINTMENT (OUTPATIENT)
Dept: CT IMAGING | Age: 75
End: 2020-06-09
Attending: INTERNAL MEDICINE
Payer: MEDICARE

## 2020-06-09 ENCOUNTER — HOSPITAL ENCOUNTER (OUTPATIENT)
Dept: CARDIAC CATH/INVASIVE PROCEDURES | Age: 75
Setting detail: OBSERVATION
Discharge: HOME | End: 2020-06-10
Attending: INTERNAL MEDICINE | Admitting: INTERNAL MEDICINE
Payer: MEDICARE

## 2020-06-09 LAB
ANION GAP SERPL CALCULATED.3IONS-SCNC: 8 MMOL/L (ref 3–16)
BUN BLDV-MCNC: 15 MG/DL (ref 7–20)
CALCIUM SERPL-MCNC: 10 MG/DL (ref 8.3–10.6)
CHLORIDE BLD-SCNC: 99 MMOL/L (ref 99–110)
CO2: 28 MMOL/L (ref 21–32)
CREAT SERPL-MCNC: 0.6 MG/DL (ref 0.6–1.2)
GFR AFRICAN AMERICAN: >60
GFR NON-AFRICAN AMERICAN: >60
GLUCOSE BLD-MCNC: 105 MG/DL (ref 70–99)
HCT VFR BLD CALC: 38.3 % (ref 36–48)
HCT VFR BLD CALC: 39.8 % (ref 36–48)
HCT VFR BLD CALC: 42.2 % (ref 36–48)
HEMOGLOBIN: 13.1 G/DL (ref 12–16)
HEMOGLOBIN: 13.4 G/DL (ref 12–16)
HEMOGLOBIN: 14.4 G/DL (ref 12–16)
MCH RBC QN AUTO: 31.1 PG (ref 26–34)
MCH RBC QN AUTO: 31.1 PG (ref 26–34)
MCHC RBC AUTO-ENTMCNC: 34.2 G/DL (ref 31–36)
MCHC RBC AUTO-ENTMCNC: 34.2 G/DL (ref 31–36)
MCV RBC AUTO: 90.7 FL (ref 80–100)
MCV RBC AUTO: 90.9 FL (ref 80–100)
PDW BLD-RTO: 12.6 % (ref 12.4–15.4)
PDW BLD-RTO: 12.7 % (ref 12.4–15.4)
PLATELET # BLD: 228 K/UL (ref 135–450)
PLATELET # BLD: 229 K/UL (ref 135–450)
PMV BLD AUTO: 7 FL (ref 5–10.5)
PMV BLD AUTO: 7.3 FL (ref 5–10.5)
POC ACT LR: 213 SEC
POC ACT LR: 242 SEC
POC ACT LR: 260 SEC
POC ACT LR: 305 SEC
POC ACT LR: 374 SEC
POTASSIUM SERPL-SCNC: 4.4 MMOL/L (ref 3.5–5.1)
RBC # BLD: 4.21 M/UL (ref 4–5.2)
RBC # BLD: 4.65 M/UL (ref 4–5.2)
SODIUM BLD-SCNC: 135 MMOL/L (ref 136–145)
WBC # BLD: 5.5 K/UL (ref 4–11)
WBC # BLD: 7 K/UL (ref 4–11)

## 2020-06-09 PROCEDURE — 74176 CT ABD & PELVIS W/O CONTRAST: CPT

## 2020-06-09 PROCEDURE — C1874 STENT, COATED/COV W/DEL SYS: HCPCS

## 2020-06-09 PROCEDURE — 93005 ELECTROCARDIOGRAM TRACING: CPT | Performed by: INTERNAL MEDICINE

## 2020-06-09 PROCEDURE — 6370000000 HC RX 637 (ALT 250 FOR IP): Performed by: INTERNAL MEDICINE

## 2020-06-09 PROCEDURE — 85347 COAGULATION TIME ACTIVATED: CPT

## 2020-06-09 PROCEDURE — C1725 CATH, TRANSLUMIN NON-LASER: HCPCS

## 2020-06-09 PROCEDURE — 6360000002 HC RX W HCPCS: Performed by: INTERNAL MEDICINE

## 2020-06-09 PROCEDURE — G0378 HOSPITAL OBSERVATION PER HR: HCPCS

## 2020-06-09 PROCEDURE — 99152 MOD SED SAME PHYS/QHP 5/>YRS: CPT

## 2020-06-09 PROCEDURE — 99153 MOD SED SAME PHYS/QHP EA: CPT

## 2020-06-09 PROCEDURE — 6370000000 HC RX 637 (ALT 250 FOR IP)

## 2020-06-09 PROCEDURE — 73700 CT LOWER EXTREMITY W/O DYE: CPT

## 2020-06-09 PROCEDURE — 2580000003 HC RX 258: Performed by: INTERNAL MEDICINE

## 2020-06-09 PROCEDURE — 6360000002 HC RX W HCPCS

## 2020-06-09 PROCEDURE — 92928 PRQ TCAT PLMT NTRAC ST 1 LES: CPT | Performed by: INTERNAL MEDICINE

## 2020-06-09 PROCEDURE — 85014 HEMATOCRIT: CPT

## 2020-06-09 PROCEDURE — 99220 PR INITIAL OBSERVATION CARE/DAY 70 MINUTES: CPT | Performed by: INTERNAL MEDICINE

## 2020-06-09 PROCEDURE — 2709999900 HC NON-CHARGEABLE SUPPLY

## 2020-06-09 PROCEDURE — C1769 GUIDE WIRE: HCPCS

## 2020-06-09 PROCEDURE — 99152 MOD SED SAME PHYS/QHP 5/>YRS: CPT | Performed by: INTERNAL MEDICINE

## 2020-06-09 PROCEDURE — 36415 COLL VENOUS BLD VENIPUNCTURE: CPT

## 2020-06-09 PROCEDURE — 6360000004 HC RX CONTRAST MEDICATION: Performed by: INTERNAL MEDICINE

## 2020-06-09 PROCEDURE — 85027 COMPLETE CBC AUTOMATED: CPT

## 2020-06-09 PROCEDURE — 80048 BASIC METABOLIC PNL TOTAL CA: CPT

## 2020-06-09 PROCEDURE — C1894 INTRO/SHEATH, NON-LASER: HCPCS

## 2020-06-09 PROCEDURE — C1887 CATHETER, GUIDING: HCPCS

## 2020-06-09 PROCEDURE — 93454 CORONARY ARTERY ANGIO S&I: CPT

## 2020-06-09 PROCEDURE — 93454 CORONARY ARTERY ANGIO S&I: CPT | Performed by: INTERNAL MEDICINE

## 2020-06-09 PROCEDURE — 2500000003 HC RX 250 WO HCPCS

## 2020-06-09 PROCEDURE — 85018 HEMOGLOBIN: CPT

## 2020-06-09 PROCEDURE — C9600 PERC DRUG-EL COR STENT SING: HCPCS

## 2020-06-09 RX ORDER — M-VIT,TX,IRON,MINS/CALC/FOLIC 27MG-0.4MG
1 TABLET ORAL DAILY
Status: DISCONTINUED | OUTPATIENT
Start: 2020-06-10 | End: 2020-06-10 | Stop reason: HOSPADM

## 2020-06-09 RX ORDER — MORPHINE SULFATE 2 MG/ML
INJECTION, SOLUTION INTRAMUSCULAR; INTRAVENOUS
Status: COMPLETED
Start: 2020-06-09 | End: 2020-06-09

## 2020-06-09 RX ORDER — LISINOPRIL 20 MG/1
20 TABLET ORAL DAILY
Status: DISCONTINUED | OUTPATIENT
Start: 2020-06-10 | End: 2020-06-10 | Stop reason: HOSPADM

## 2020-06-09 RX ORDER — ATORVASTATIN CALCIUM 40 MG/1
40 TABLET, FILM COATED ORAL DAILY
Status: DISCONTINUED | OUTPATIENT
Start: 2020-06-09 | End: 2020-06-10 | Stop reason: HOSPADM

## 2020-06-09 RX ORDER — ATROPINE SULFATE 0.1 MG/ML
INJECTION INTRAVENOUS
Status: COMPLETED
Start: 2020-06-09 | End: 2020-06-09

## 2020-06-09 RX ORDER — ONDANSETRON 2 MG/ML
4 INJECTION INTRAMUSCULAR; INTRAVENOUS EVERY 6 HOURS PRN
Status: DISCONTINUED | OUTPATIENT
Start: 2020-06-09 | End: 2020-06-10 | Stop reason: HOSPADM

## 2020-06-09 RX ORDER — ALBUTEROL SULFATE 90 UG/1
2 AEROSOL, METERED RESPIRATORY (INHALATION) EVERY 6 HOURS PRN
Status: DISCONTINUED | OUTPATIENT
Start: 2020-06-09 | End: 2020-06-10 | Stop reason: HOSPADM

## 2020-06-09 RX ORDER — PROMETHAZINE HYDROCHLORIDE 25 MG/ML
12.5 INJECTION, SOLUTION INTRAMUSCULAR; INTRAVENOUS EVERY 6 HOURS PRN
Status: DISCONTINUED | OUTPATIENT
Start: 2020-06-09 | End: 2020-06-10 | Stop reason: HOSPADM

## 2020-06-09 RX ORDER — MONTELUKAST SODIUM 10 MG/1
10 TABLET ORAL NIGHTLY
Status: DISCONTINUED | OUTPATIENT
Start: 2020-06-09 | End: 2020-06-10 | Stop reason: HOSPADM

## 2020-06-09 RX ORDER — SODIUM CHLORIDE 0.9 % (FLUSH) 0.9 %
10 SYRINGE (ML) INJECTION EVERY 12 HOURS SCHEDULED
Status: DISCONTINUED | OUTPATIENT
Start: 2020-06-09 | End: 2020-06-10 | Stop reason: HOSPADM

## 2020-06-09 RX ORDER — CARVEDILOL 6.25 MG/1
6.25 TABLET ORAL 2 TIMES DAILY
Status: DISCONTINUED | OUTPATIENT
Start: 2020-06-09 | End: 2020-06-10 | Stop reason: HOSPADM

## 2020-06-09 RX ORDER — MORPHINE SULFATE 2 MG/ML
2 INJECTION, SOLUTION INTRAMUSCULAR; INTRAVENOUS
Status: ACTIVE | OUTPATIENT
Start: 2020-06-09 | End: 2020-06-09

## 2020-06-09 RX ORDER — ACETAMINOPHEN 325 MG/1
650 TABLET ORAL EVERY 4 HOURS PRN
Status: DISCONTINUED | OUTPATIENT
Start: 2020-06-09 | End: 2020-06-10 | Stop reason: HOSPADM

## 2020-06-09 RX ORDER — CLOPIDOGREL BISULFATE 75 MG/1
75 TABLET ORAL DAILY
Status: DISCONTINUED | OUTPATIENT
Start: 2020-06-10 | End: 2020-06-10 | Stop reason: HOSPADM

## 2020-06-09 RX ORDER — ASPIRIN 81 MG/1
81 TABLET ORAL DAILY
Status: DISCONTINUED | OUTPATIENT
Start: 2020-06-09 | End: 2020-06-10 | Stop reason: HOSPADM

## 2020-06-09 RX ORDER — ASPIRIN 81 MG/1
81 TABLET ORAL DAILY
COMMUNITY

## 2020-06-09 RX ORDER — ONDANSETRON 2 MG/ML
INJECTION INTRAMUSCULAR; INTRAVENOUS
Status: COMPLETED
Start: 2020-06-09 | End: 2020-06-09

## 2020-06-09 RX ORDER — KETOROLAC TROMETHAMINE 30 MG/ML
15 INJECTION, SOLUTION INTRAMUSCULAR; INTRAVENOUS EVERY 6 HOURS PRN
Status: DISCONTINUED | OUTPATIENT
Start: 2020-06-09 | End: 2020-06-10 | Stop reason: HOSPADM

## 2020-06-09 RX ORDER — MORPHINE SULFATE 2 MG/ML
INJECTION, SOLUTION INTRAMUSCULAR; INTRAVENOUS
Status: DISCONTINUED
Start: 2020-06-09 | End: 2020-06-09 | Stop reason: WASHOUT

## 2020-06-09 RX ORDER — SODIUM CHLORIDE 0.9 % (FLUSH) 0.9 %
10 SYRINGE (ML) INJECTION PRN
Status: DISCONTINUED | OUTPATIENT
Start: 2020-06-09 | End: 2020-06-10 | Stop reason: HOSPADM

## 2020-06-09 RX ADMIN — ONDANSETRON 4 MG: 2 INJECTION INTRAMUSCULAR; INTRAVENOUS at 19:00

## 2020-06-09 RX ADMIN — PROMETHAZINE HYDROCHLORIDE 12.5 MG: 25 INJECTION INTRAMUSCULAR; INTRAVENOUS at 19:36

## 2020-06-09 RX ADMIN — KETOROLAC TROMETHAMINE 15 MG: 30 INJECTION, SOLUTION INTRAMUSCULAR at 19:51

## 2020-06-09 RX ADMIN — Medication 10 ML: at 19:37

## 2020-06-09 RX ADMIN — IOPAMIDOL 242 ML: 755 INJECTION, SOLUTION INTRAVENOUS at 11:33

## 2020-06-09 RX ADMIN — MORPHINE SULFATE 2 MG: 2 INJECTION, SOLUTION INTRAMUSCULAR; INTRAVENOUS at 16:44

## 2020-06-09 RX ADMIN — ATROPINE SULFATE 0.5 MG: 0.1 INJECTION PARENTERAL at 16:51

## 2020-06-09 ASSESSMENT — PAIN SCALES - GENERAL
PAINLEVEL_OUTOF10: 9
PAINLEVEL_OUTOF10: 8
PAINLEVEL_OUTOF10: 0
PAINLEVEL_OUTOF10: 10
PAINLEVEL_OUTOF10: 10
PAINLEVEL_OUTOF10: 9
PAINLEVEL_OUTOF10: 10
PAINLEVEL_OUTOF10: 9
PAINLEVEL_OUTOF10: 9
PAINLEVEL_OUTOF10: 3
PAINLEVEL_OUTOF10: 10
PAINLEVEL_OUTOF10: 9
PAINLEVEL_OUTOF10: 9
PAINLEVEL_OUTOF10: 10
PAINLEVEL_OUTOF10: 7
PAINLEVEL_OUTOF10: 9

## 2020-06-09 NOTE — PROGRESS NOTES
Called for bleeding after sheath pull. Pressure held for 30 minutes but bleeding persisted afterwards and hematoma developed. Pressure held another 40 minutes. Pt given morphine for pain. BP dropped to 70/40 with HR of 40. Likely vagal. Improved with atropine and IVF bolus. Fem stop placed, no further bleeding noted. Doppler of DP/TP confirm adequate distal LE perfusion. Check CBC and if BP still low consider CT scan abd/pelvis.

## 2020-06-09 NOTE — H&P
hours as needed for Wheezing 11/12/18  Yes Keli Porter MD   Multiple Vitamins-Minerals (THERAPEUTIC MULTIVITAMIN-MINERALS) tablet Take 1 tablet by mouth daily   Yes Historical Provider, MD   montelukast (SINGULAIR) 10 MG tablet Take 10 mg by mouth nightly. Yes Historical Provider, MD   UNABLE TO FIND 600 mg Immune- Assist    Historical Provider, MD       Allergies:  Darvon [propoxyphene hcl]; Minocin [minocycline hcl];  Minocycline; Propoxyphene; Oseltamivir; and Tamiflu [oseltamivir phosphate]     Review of Systems:   ?Full ROS obtained and negative except as mentioned in HPI    Physical Examination:    BP (!) 136/58   Pulse 57   Temp 97.6 °F (36.4 °C)   Resp 16   Ht 5' 5\" (1.651 m)   Wt 157 lb (71.2 kg)   SpO2 99%   BMI 26.13 kg/m²   Wt Readings from Last 3 Encounters:   06/09/20 157 lb (71.2 kg)   06/08/20 157 lb (71.2 kg)   06/01/20 159 lb 1.6 oz (72.2 kg)       GENERAL: Well developed, well nourished, no acute distress  NEUROLOGICAL: Alert and oriented x3  PSYCH: Normal mood and affect   SKIN: Warm and dry, without lesions  HEENT: Normocephalic, atraumatic, Sclera non-icteric, mucous membranes moist  NECK: supple, JVP normal, thyroid not enlarged   CAROTID: Normal upstroke, no bruits  CARDIAC: Normal PMI, regular rate and rhythm, normal S1S2, no murmur, rub  RESPIRATORY: Normal respiratory effort, clear to auscultation bilaterally  EXTREMITIES: No cyanosis, clubbing or edema, palpable pulses bilaterally   MUSCULOSKELETAL: No joint swelling or tenderness, no chest wall tenderness  GASTROINTESTINAL:  soft, non-tender, no bruit    Labs:  Lab Review   Lab Results   Component Value Date     06/09/2020    K 4.4 06/09/2020    K 3.9 05/16/2019    CL 99 06/09/2020    CO2 28 06/09/2020    BUN 15 06/09/2020    CREATININE 0.6 06/09/2020    GLUCOSE 105 06/09/2020    CALCIUM 10.0 06/09/2020     Lab Results   Component Value Date    TROPONINI <0.01 05/15/2019     Lab Results   Component Value Date    WBC 5.5

## 2020-06-09 NOTE — PROGRESS NOTES
Clinical Pharmacy Note    Patient is on Anoro Ellipta 62.5-25 mcg/inh at home. Changed to Bevespi per formulary change protocol.     Adam Montalvo, PharmD, [facility]  Clinical Pharmacist  [id number]

## 2020-06-10 VITALS
WEIGHT: 153.66 LBS | BODY MASS INDEX: 25.6 KG/M2 | OXYGEN SATURATION: 95 % | RESPIRATION RATE: 18 BRPM | HEART RATE: 65 BPM | SYSTOLIC BLOOD PRESSURE: 107 MMHG | HEIGHT: 65 IN | TEMPERATURE: 98 F | DIASTOLIC BLOOD PRESSURE: 74 MMHG

## 2020-06-10 LAB
ANION GAP SERPL CALCULATED.3IONS-SCNC: 8 MMOL/L (ref 3–16)
BUN BLDV-MCNC: 16 MG/DL (ref 7–20)
CALCIUM SERPL-MCNC: 9.5 MG/DL (ref 8.3–10.6)
CHLORIDE BLD-SCNC: 101 MMOL/L (ref 99–110)
CHOLESTEROL, TOTAL: 105 MG/DL (ref 0–199)
CO2: 24 MMOL/L (ref 21–32)
CREAT SERPL-MCNC: 0.6 MG/DL (ref 0.6–1.2)
EKG ATRIAL RATE: 53 BPM
EKG ATRIAL RATE: 54 BPM
EKG DIAGNOSIS: NORMAL
EKG DIAGNOSIS: NORMAL
EKG P AXIS: 61 DEGREES
EKG P AXIS: 70 DEGREES
EKG P-R INTERVAL: 154 MS
EKG P-R INTERVAL: 170 MS
EKG Q-T INTERVAL: 472 MS
EKG Q-T INTERVAL: 478 MS
EKG QRS DURATION: 128 MS
EKG QRS DURATION: 138 MS
EKG QTC CALCULATION (BAZETT): 442 MS
EKG QTC CALCULATION (BAZETT): 453 MS
EKG R AXIS: 62 DEGREES
EKG R AXIS: 71 DEGREES
EKG T AXIS: 83 DEGREES
EKG T AXIS: 92 DEGREES
EKG VENTRICULAR RATE: 53 BPM
EKG VENTRICULAR RATE: 54 BPM
GFR AFRICAN AMERICAN: >60
GFR NON-AFRICAN AMERICAN: >60
GLUCOSE BLD-MCNC: 93 MG/DL (ref 70–99)
HCT VFR BLD CALC: 34.9 % (ref 36–48)
HDLC SERPL-MCNC: 45 MG/DL (ref 40–60)
HEMOGLOBIN: 11.9 G/DL (ref 12–16)
LDL CHOLESTEROL CALCULATED: 49 MG/DL
MCH RBC QN AUTO: 30.8 PG (ref 26–34)
MCHC RBC AUTO-ENTMCNC: 34.1 G/DL (ref 31–36)
MCV RBC AUTO: 90.3 FL (ref 80–100)
PDW BLD-RTO: 12.7 % (ref 12.4–15.4)
PLATELET # BLD: 202 K/UL (ref 135–450)
PMV BLD AUTO: 7 FL (ref 5–10.5)
POTASSIUM SERPL-SCNC: 4.1 MMOL/L (ref 3.5–5.1)
RBC # BLD: 3.86 M/UL (ref 4–5.2)
SODIUM BLD-SCNC: 133 MMOL/L (ref 136–145)
TRIGL SERPL-MCNC: 55 MG/DL (ref 0–150)
VLDLC SERPL CALC-MCNC: 11 MG/DL
WBC # BLD: 5.8 K/UL (ref 4–11)

## 2020-06-10 PROCEDURE — 80048 BASIC METABOLIC PNL TOTAL CA: CPT

## 2020-06-10 PROCEDURE — G0378 HOSPITAL OBSERVATION PER HR: HCPCS

## 2020-06-10 PROCEDURE — 99217 PR OBSERVATION CARE DISCHARGE MANAGEMENT: CPT | Performed by: INTERNAL MEDICINE

## 2020-06-10 PROCEDURE — 94640 AIRWAY INHALATION TREATMENT: CPT

## 2020-06-10 PROCEDURE — 6370000000 HC RX 637 (ALT 250 FOR IP): Performed by: INTERNAL MEDICINE

## 2020-06-10 PROCEDURE — 85027 COMPLETE CBC AUTOMATED: CPT

## 2020-06-10 PROCEDURE — 2580000003 HC RX 258: Performed by: INTERNAL MEDICINE

## 2020-06-10 PROCEDURE — 94760 N-INVAS EAR/PLS OXIMETRY 1: CPT

## 2020-06-10 PROCEDURE — 93010 ELECTROCARDIOGRAM REPORT: CPT | Performed by: INTERNAL MEDICINE

## 2020-06-10 PROCEDURE — 80061 LIPID PANEL: CPT

## 2020-06-10 RX ORDER — CLOPIDOGREL BISULFATE 75 MG/1
75 TABLET ORAL DAILY
Qty: 30 TABLET | Refills: 3 | Status: SHIPPED | OUTPATIENT
Start: 2020-06-10 | End: 2020-09-29 | Stop reason: SDUPTHER

## 2020-06-10 RX ADMIN — LISINOPRIL 20 MG: 20 TABLET ORAL at 07:56

## 2020-06-10 RX ADMIN — MULTIPLE VITAMINS W/ MINERALS TAB 1 TABLET: TAB at 07:56

## 2020-06-10 RX ADMIN — ASPIRIN 81 MG: 81 TABLET, COATED ORAL at 07:56

## 2020-06-10 RX ADMIN — DESMOPRESSIN ACETATE 40 MG: 0.2 TABLET ORAL at 07:56

## 2020-06-10 RX ADMIN — Medication 10 ML: at 13:50

## 2020-06-10 RX ADMIN — GLYCOPYRROLATE AND FORMOTEROL FUMARATE 2 PUFF: 9; 4.8 AEROSOL, METERED RESPIRATORY (INHALATION) at 09:12

## 2020-06-10 RX ADMIN — CLOPIDOGREL 75 MG: 75 TABLET, FILM COATED ORAL at 07:56

## 2020-06-10 ASSESSMENT — PAIN SCALES - GENERAL
PAINLEVEL_OUTOF10: 0

## 2020-06-10 NOTE — PLAN OF CARE
Problem: Pain:  Goal: Pain level will decrease  Description: Pain level will decrease  Outcome: Ongoing  Goal: Control of acute pain  Description: Control of acute pain  6/10/2020 0806 by Luis Atkins RN  Outcome: Ongoing  6/9/2020 2123 by Nataliya Arnold RN  Outcome: Ongoing  Note: Pt alert and oriented. Pt able to communicate present pain and use the pain scale appropriately. Nonpharmacological pain reducers and pain medication offered as needed. Will cont to monitor.    Goal: Control of chronic pain  Description: Control of chronic pain  Outcome: Ongoing

## 2020-06-10 NOTE — PROGRESS NOTES
Cardiovascular Progress Note      Chief Complaint:   Abnormal nuclear stress test  Impression/Recommendations:    Ms. Deidra Kurtz is a 69 y.o. female patient of Dr. Dudley Turcios :    CAD: KYM- severe proximal LAD 6/9/20 (2009 proximal RCA stent with mild ISR; moderate nonobstructive midRCA and midLAD)  CMP, recovered   Chronic LBBB  PVD with history of subclavian grafting   Hypertension  Hyperlipidemia  COPD  Former tobacco abuse       ASA long term  Clopidogrel for 12 months  No change to home GDMT per Dr. Dudley Turcios.   OK to DC home today. FU arranged for 7-10 days. Interval History:  Pt.S/E. Called by bedside RN after partner evaluated left groin hematoma following manual sheath removal in CVU yesterday. Personally evaluated bedside last night. CT ordered and reviewed-in line with subcutaneous blood collection. No RPB or vascular malformation. Left groin this morning soft, ecchymotic, minimally tender. \"Chest heaviness is gone\". Denies chest pain, palpitations, SOB. BP historically labile: 120-133/46 this morning. 6/9/20 CT   1. Extensive subcutaneous stranding at the left groin and extending along the   long left pelvic sidewall consistent with blood products given recent   intervention at this site and overlying compression device.  Prominent   varicosities are identified extending from the left groin and along the left   thigh with large varicosity identified just anterior to the femoral vessels.          Medications:  montelukast (SINGULAIR) tablet 10 mg, Nightly  therapeutic multivitamin-minerals 1 tablet, Daily  albuterol sulfate  (90 Base) MCG/ACT inhaler 2 puff, Q6H PRN  carvedilol (COREG) tablet 6.25 mg, BID  lisinopril (PRINIVIL;ZESTRIL) tablet 20 mg, Daily  atorvastatin (LIPITOR) tablet 40 mg, Daily  aspirin EC tablet 81 mg, Daily  sodium chloride flush 0.9 % injection 10 mL, 2 times per day  sodium chloride flush 0.9 % injection 10 mL, PRN  acetaminophen (TYLENOL) tablet 650 mg,

## 2020-06-10 NOTE — PLAN OF CARE
Problem: Pain:  Goal: Control of acute pain  Description: Control of acute pain  Outcome: Ongoing  Note: Pt alert and oriented. Pt able to communicate present pain and use the pain scale appropriately. Nonpharmacological pain reducers and pain medication offered as needed. Will cont to monitor.

## 2020-06-10 NOTE — DISCHARGE SUMMARY
INHUB) 250-50 MCG/DOSE AEPB Comments:   Reason for Stopping:               Condition on Discharge: Good  Activity: as tolerated  Diet: Cardiac     Follow-up within 1-2 weeks with the Juan Jose Kapadia as well as PCP.       Homer Green D.O., [facility]  Interventional Cardiology     o: [phone]  [address] [facility], [address] [place] [address]

## 2020-06-10 NOTE — FLOWSHEET NOTE
Discharge note: Patient has been seen by doctor. Discharge order obtained, and discharge instructions reviewed. Patient educated, using the teach back method, about follow up instructions and discharge instructions. A completed copy of the AVS instructions given to patient and all questions answered. IV catheter removed without complaints, catheter intact, site WNL. Discharged to [facility] via wheel chair per RN.  Electronically signed by Ale Ansari RN on 6/10/2020 at 5:17 PM

## 2020-06-10 NOTE — DISCHARGE INSTR - COC
Continuity of Care Form    Patient Name: Olga Etienne   :  1945  MRN:  [id number]    Admit date:  2020  Discharge date:  ***    Code Status Order: Full Code   Advance Directives:   Advance Care Flowsheet Documentation     Date/Time Healthcare Directive Type of Healthcare Directive Copy in [address] Agent's Name Healthcare Agent's Phone Number    20 1920  Yes, patient has an advance directive for healthcare treatment  Durable power of  for health care  No, copy requested from family  --  --  --          Admitting Physician:  Chaz Linares DO  PCP: Hilario Michelle MD    Discharging Nurse: [facility] Unit/Room#: CVU-2911/2911-01  Discharging Unit Phone Number: ***    Emergency Contact:   Extended Emergency Contact Information  Primary Emergency Contact: [address] [facility] Phone: [phone]  Relation: Child  Secondary Emergency Contact: Steven Murillo Phone: [phone]  Relation: Child    Past Surgical History:  Past Surgical History:   Procedure Laterality Date    APPENDECTOMY      BREAST BIOPSY      CARDIAC SURGERY      RCA    CHOLECYSTECTOMY      COLONOSCOPY      HYSTEROSCOPY  2016    HYSTEROSCOPY DILATATION AND CURETTAGE WITH [facility],     TUBAL LIGATION         Immunization History:   Immunization History   Administered Date(s) Administered    Influenza Vaccine, unspecified formulation 10/01/2016    Influenza, High Dose (Fluzone 65 yrs and older) 10/23/2017, 10/01/2018    Pneumococcal Conjugate 13-valent (Zuleyka Mutters) 2017    Pneumococcal Polysaccharide (Diucosgtj39) 2011       Active Problems:  Patient Active Problem List   Diagnosis Code    Hyperlipidemia E78.5    Coronary artery disease I25.10    Cardiomyopathy ([facility] [address] I42.9    Postmenopausal bleeding N95.0    Hypoxia R09.02    Centrilobular emphysema ([facility] [address] [facility] J43.2    Mallet

## 2020-06-11 LAB — POC ACT LR: 168 SEC

## 2020-06-19 ENCOUNTER — OFFICE VISIT (OUTPATIENT)
Dept: CARDIOLOGY CLINIC | Age: 75
End: 2020-06-19
Payer: MEDICARE

## 2020-06-19 VITALS
HEIGHT: 65 IN | DIASTOLIC BLOOD PRESSURE: 80 MMHG | WEIGHT: 156 LBS | SYSTOLIC BLOOD PRESSURE: 122 MMHG | OXYGEN SATURATION: 58 % | BODY MASS INDEX: 25.99 KG/M2 | HEART RATE: 98 BPM

## 2020-06-19 PROCEDURE — G8400 PT W/DXA NO RESULTS DOC: HCPCS | Performed by: NURSE PRACTITIONER

## 2020-06-19 PROCEDURE — 1123F ACP DISCUSS/DSCN MKR DOCD: CPT | Performed by: NURSE PRACTITIONER

## 2020-06-19 PROCEDURE — 1036F TOBACCO NON-USER: CPT | Performed by: NURSE PRACTITIONER

## 2020-06-19 PROCEDURE — 99214 OFFICE O/P EST MOD 30 MIN: CPT | Performed by: NURSE PRACTITIONER

## 2020-06-19 PROCEDURE — G8417 CALC BMI ABV UP PARAM F/U: HCPCS | Performed by: NURSE PRACTITIONER

## 2020-06-19 PROCEDURE — 3017F COLORECTAL CA SCREEN DOC REV: CPT | Performed by: NURSE PRACTITIONER

## 2020-06-19 PROCEDURE — 1090F PRES/ABSN URINE INCON ASSESS: CPT | Performed by: NURSE PRACTITIONER

## 2020-06-19 PROCEDURE — G9899 SCRN MAM PERF RSLTS DOC: HCPCS | Performed by: NURSE PRACTITIONER

## 2020-06-19 PROCEDURE — G8427 DOCREV CUR MEDS BY ELIG CLIN: HCPCS | Performed by: NURSE PRACTITIONER

## 2020-06-19 PROCEDURE — 4040F PNEUMOC VAC/ADMIN/RCVD: CPT | Performed by: NURSE PRACTITIONER

## 2020-06-19 NOTE — PROGRESS NOTES
She can feel bumps in her Lt groin with a large amt bruising. These symptoms are improving over the last many days. With regard to medication therapy the patient has been compliant with prescribed regimen. They have tolerated therapy to date. Past Medical History:   Diagnosis Date    Bruising     CAD (coronary artery disease)     Cardiomyopathy ([facility] [address]     Emphysema     Hyperlipidemia     Hypertension     [facility]      Social History:    Social History     Tobacco Use   Smoking Status Former Smoker    Packs/day: 1.00    Years: 37.00    Pack years: 37.00    Last attempt to quit: 1994    Years since quittin.8   Smokeless Tobacco Never Used   Tobacco Comment    started at age 16 / smoked up to 1 p.p.d / smoked for 37 yrs     Current Medications:  Current Outpatient Medications   Medication Sig Dispense Refill    clopidogrel (PLAVIX) 75 MG tablet Take 1 tablet by mouth daily 30 tablet 3    aspirin 81 MG EC tablet Take 81 mg by mouth daily      umeclidinium-vilanterol (ANORO ELLIPTA) 62.5-25 MCG/INH AEPB inhaler Inhale 1 puff into the lungs daily 2 each 0    UNABLE TO FIND 600 mg Immune- Assist      atorvastatin (LIPITOR) 40 MG tablet TAKE ONE TABLET BY MOUTH DAILY 90 tablet 4    lisinopril (PRINIVIL;ZESTRIL) 20 MG tablet Take 1 tablet by mouth daily 90 tablet 4    carvedilol (COREG) 12.5 MG tablet Take 1 tablet by mouth 2 times daily 180 tablet 3    albuterol sulfate  (90 Base) MCG/ACT inhaler Inhale 2 puffs into the lungs every 6 hours as needed for Wheezing 1 Inhaler 2    Multiple Vitamins-Minerals (THERAPEUTIC MULTIVITAMIN-MINERALS) tablet Take 1 tablet by mouth daily      montelukast (SINGULAIR) 10 MG tablet Take 10 mg by mouth nightly. No current facility-administered medications for this visit. REVIEW OF SYSTEMS:   CONSTITUTIONAL: No major weight gain or loss, fatigue, weakness, night sweats or fever.  There's been no change in energy level, sleep pattern, or activity level. HEENT: No new vision difficulties or ringing in the ears. RESPIRATORY: No new SOB, PND, orthopnea or cough. CARDIOVASCULAR: See HPI  GI: No nausea, vomiting, diarrhea, constipation, abdominal pain or changes in bowel habits. : No urinary frequency, urgency, incontinence hematuria or dysuria. SKIN: No cyanosis or skin lesions. MUSCULOSKELETAL: No new muscle or joint pain. NEUROLOGICAL: No syncope or TIA-like symptoms. PSYCHIATRIC: No anxiety, pain, insomnia or depression    Objective:   PHYSICAL EXAM:       Vitals:    06/19/20 1014 06/19/20 1045   BP: 120/72 122/80   Site: Right Upper Arm Right Upper Arm   Position: Sitting    Cuff Size: Large Adult Medium Adult   Pulse: 98    SpO2: (!) 58%    Weight: 156 lb (70.8 kg)    Height: 5' 5\" (1.651 m)         VITALS:  /72 (Site: Right Upper Arm, Position: Sitting, Cuff Size: Large Adult)   Pulse 98   Ht 5' 5\" (1.651 m)   Wt 156 lb (70.8 kg)   SpO2 (!) 58%   BMI 25.96 kg/m²     CONSTITUTIONAL: Cooperative, no apparent distress, and appears well nourished / developed  NEUROLOGIC:  Awake and orientated to person, place and time. PSYCH: Calm affect. SKIN: Warm and dry: small firm-old hematoma; large ecchymosis Lt groin extending to pubis and anterior-medial thigh. HEENT: Sclera non-icteric, normocephalic, neck supple, no elevation of JVP, normal carotid pulses with no bruits and thyroid normal size. LUNGS:  No increased work of breathing and clear to auscultation, no crackles or wheezing. CARDIOVASCULAR:  Regular rate 68 and rhythm with no murmurs, gallops, rubs, or abnormal heart sounds, normal PMI. The apical impulses not displaced.                                Heart tones are crisp and normal                                                                                            Cervical veins are not engorged                 JVP less than 8 cm H2O Angiogram: 6/9/20:  Dominance : Right      LM: normal   LAD: tandem 80% mildly calcified proximal stenoses; 50% mid plaque disease  LCx: luminal irregularities  RCA:  Proximal stent with 30-40% in stent restenosis; 50% mid lesion     6Fr XB 3.5 Guide  Runthrough wire to LCx  BMW wire to distal LAD     2.5 x 8 mm Trek predil  2.75 x 20 mm Trek predil      3.0 x 23 mm Xience KYM near ostial LAD     3.5 x 12 mm NC Trek ultimately to 14 SCOOTER at distal edge and 20 SCOOTER at proximal edge  20% proximal residual      Impression/Recommendations:  KYM-proximal LAD  ASA long term  Clopidogrel loaded in lab and for 12 months    CT abd / pelvis: 6/9/20   Impression   1. Extensive subcutaneous stranding at the left groin and extending along the   long left pelvic sidewall consistent with blood products given recent   intervention at this site and overlying compression device.  Prominent   varicosities are identified extending from the left groin and along the left   thigh with large varicosity identified just anterior to the femoral vessels.           Assessment:      Diagnosis Orders   1. Atherosclerosis of native coronary artery of native heart without angina pectoris   ~stable   ~s/p PTCA LAD Esteban '20   ~large amt ecchymosis post procedure   ~atypical discomfort of heaviness and burning relieved leaning forward and using inhalers  ~hx s/p PTCA RCA '09  ~DAPT / statin / BB    2. Essential hypertension   ~controlled     3. Mixed hyperlipidemia   ~controlled on atorvastatin   ~consider changing to crestor for plaque regression         Patient  is stable since hospital discharge. Plan:  OTC H2B for burning discomfort   appt in 4 weeks : reassess procedure site    I have addressed the patient's cardiac risk factors and adjusted pharmacologic treatment as needed. In addition, I have reinforced the need for patient directed risk factor modification.     Further evaluation will be based upon the patient's clinical course and testing

## 2020-06-26 ENCOUNTER — TELEPHONE (OUTPATIENT)
Dept: PULMONOLOGY | Age: 75
End: 2020-06-26

## 2020-07-16 RX ORDER — UMECLIDINIUM BROMIDE AND VILANTEROL TRIFENATATE 62.5; 25 UG/1; UG/1
1 POWDER RESPIRATORY (INHALATION) DAILY
Qty: 2 EACH | Refills: 0 | COMMUNITY
Start: 2020-07-16 | End: 2020-07-22 | Stop reason: SDUPTHER

## 2020-07-16 NOTE — TELEPHONE ENCOUNTER
Pt calls in requesting sample of Anoro 62.5-25. In \"donut hole with Medicare. \"  LOV 6/8/20. NOV 8/25/20. Given sample last month. If sample approved can  next week, 7/22.

## 2020-07-16 NOTE — TELEPHONE ENCOUNTER
Called Rosa Isela to let her know the Anoro samples will be at the   She said she will probably be by on Wednesday

## 2020-07-21 NOTE — PROGRESS NOTES
[address]  Cardiology Consult/follow up    Miquel Kee  1945 July 21, 2020    Referring Physician: Dr. Charley Donohue  Reason for Referral: off/on SOB and dizziness     CC: \"I have not felt good at all since my left heart cath. \"     HPI:  The patient is 69 y.o. female with a past medical history significant for COPD/emphysema, CAD, CMP, HTN, HLD presents per Dr. Charley Donohue as a second opinion for ongoing off/on SOB and dizziness. Primary Cardiologist Dr. Gabriela Morgan and seen in the [facility] 6/1/20 for left sided chest pain x2 weeks, most notiable in bed with some exertional component. As reported, she has chronic mild CONNOR and was unchanged at that time. She had also reported increase in life stressors with a recent move. Stress study was completed on 6/2/20 which was abnormal. She was set up for a [facility] 6/9/20 revealing 80% proximal LAD stenosis s/p Xience KYM near ostial LAD with residual disease per Dr Anya Chin. She presents today per Dr. Charley Donohue for further assessment of SOB and dizziness that occurs off/on. Today, she reports that she has not \"felt good since my left heart cath. \" She reports worsening  positional dizziness from getting up from a seated position, weakness and unsteadiness walking, left arm numbness and tingling down to fingers since her [facility], off/on, random. She admits that she continues with chronic SOB at rest, not with exertion, she feels is due to her COPD/former smoker-quit 25 years ago and has been unchanged since her [facility] and continues to worry that it is her heart. Denies PND, orthopnea or sleeping issues. No BLE edema. She denies heart racing, fluttering, palpitations. She feels that she is tolerating her medical therapy and reports compliance. She also denies any abnormal bruising or bleeding on DAPT. Reviewed allergies.      Past Medical History:   Diagnosis Date    Bruising     CAD (coronary artery disease)     Cardiomyopathy ([facility] [address]     Emphysema  Hyperlipidemia     Hypertension     Myocardial infarct [facility])      Past Surgical History:   Procedure Laterality Date    APPENDECTOMY      BREAST BIOPSY      CARDIAC SURGERY  2009    RCA    CHOLECYSTECTOMY      COLONOSCOPY      HYSTEROSCOPY  2016    HYSTEROSCOPY DILATATION AND CURETTAGE WITH MYOSURE    SUBCLAVIAN BYPASS GRAFT      chest,     TUBAL LIGATION       Family History   Problem Relation Age of Onset    Heart Failure Mother     Emphysema Mother     Heart Disease Mother     Liver Disease Father     Cancer Father      Social History     Tobacco Use    Smoking status: Former Smoker     Packs/day: 1.00     Years: 37.00     Pack years: 37.00     Last attempt to quit: 1994     Years since quittin.8    Smokeless tobacco: Never Used    Tobacco comment: started at age 16 / smoked up to 1 p.p.d / smoked for 37 yrs   Substance Use Topics    Alcohol use:  Yes     Alcohol/week: 2.0 standard drinks     Types: 2 Glasses of wine per week     Comment: OCCAS    Drug use: No       Allergies   Allergen Reactions    Darvon [Propoxyphene Hcl]      \"OUT OF BODY EXPERIENCE\"    Minocin [Minocycline Hcl]      BLADDER INFECTION    Minocycline     Propoxyphene     Oseltamivir Nausea And Vomiting    Tamiflu [Oseltamivir Phosphate] Nausea And Vomiting     Current Outpatient Medications   Medication Sig Dispense Refill    umeclidinium-vilanterol (ANORO ELLIPTA) 62.5-25 MCG/INH AEPB inhaler Inhale 1 puff into the lungs daily 2 each 0    clopidogrel (PLAVIX) 75 MG tablet Take 1 tablet by mouth daily 30 tablet 3    aspirin 81 MG EC tablet Take 81 mg by mouth daily      umeclidinium-vilanterol (ANORO ELLIPTA) 62.5-25 MCG/INH AEPB inhaler Inhale 1 puff into the lungs daily 2 each 0    UNABLE TO FIND 600 mg Immune- Assist      atorvastatin (LIPITOR) 40 MG tablet TAKE ONE TABLET BY MOUTH DAILY 90 tablet 4    lisinopril (PRINIVIL;ZESTRIL) 20 MG tablet Take 1 tablet by mouth daily 90 tablet 4    carvedilol (COREG) 12.5 MG tablet Take 1 tablet by mouth 2 times daily 180 tablet 3    albuterol sulfate  (90 Base) MCG/ACT inhaler Inhale 2 puffs into the lungs every 6 hours as needed for Wheezing 1 Inhaler 2    Multiple Vitamins-Minerals (THERAPEUTIC MULTIVITAMIN-MINERALS) tablet Take 1 tablet by mouth daily      montelukast (SINGULAIR) 10 MG tablet Take 10 mg by mouth nightly. No current facility-administered medications for this visit. Review of Systems:  · Constitutional: no unanticipated weight loss. There's been no change in energy level, sleep pattern, or activity level. No fevers, chills. · Eyes: No visual changes or diplopia. No scleral icterus. · ENT: No Headaches, hearing loss or vertigo. No mouth sores or sore throat. · Cardiovascular: as reviewed in HPI  · Respiratory: No cough or wheezing, no sputum production. No hematemesis. · Gastrointestinal: No abdominal pain, appetite loss, blood in stools. No change in bowel or bladder habits. · Genitourinary: No dysuria, trouble voiding, or hematuria. · Musculoskeletal:  No gait disturbance, no joint complaints. · Integumentary: No rash or pruritis. · Neurological: No headache, diplopia, change in muscle strength, numbness or tingling. · Psychiatric: No anxiety or depression. · Endocrine: No temperature intolerance. No excessive thirst, fluid intake, or urination. No tremor. · Hematologic/Lymphatic: No abnormal bruising or bleeding, blood clots or swollen lymph nodes. · Allergic/Immunologic: No nasal congestion or hives.     Physical Exam:   Vitals:    07/22/20 1232 07/22/20 1244   BP: (!) 184/80 (!) 180/80   Site: Left Upper Arm Left Upper Arm   Position: Sitting Sitting   Cuff Size: Medium Adult Medium Adult   Pulse: 56    Temp: 97.9 °F (36.6 °C)    SpO2: 98%    Weight: 155 lb (70.3 kg)    Height: 5' 5\" (1.651 m)      Wt Readings from Last 3 Encounters:   06/19/20 156 lb (70.8 kg)   06/10/20 153 lb 10.6 oz (69.7 kg)   06/08/20 157 lb (71.2 kg)     Constitutional: She is oriented to person, place, and time. She appears well-developed and well-nourished. In no acute distress. Head: Normocephalic and atraumatic. Pupils equal and round. Neck: Neck supple. No JVP. Bilateral + carotid bruit appreciated. No mass and no thyromegaly present. No lymphadenopathy present. Cardiovascular: Normal rate. Normal heart sounds patient has split second heart sound. Exam reveals no gallop and no friction rub. No murmur heard. Pulmonary/Chest: Effort normal and breath sounds normal. No respiratory distress. She has no wheezes, rhonchi or rales. Abdominal: Soft, non-tender. Bowel sounds are normal. She exhibits no organomegaly, mass or bruit. Extremities: No edema, cyanosis, or clubbing. Pulses are 2+ radial/dorsalis pedis/posterior tibial/carotid bilaterally. Neurological: No gross cranial nerve deficit. Coordination normal.   Skin: Skin is warm and dry. There is no rash or diaphoresis. Psychiatric: She has a normal mood and affect. Her speech is normal and behavior is normal.     Lab Review:     Lab Results   Component Value Date    TRIG 55 06/10/2020    HDL 45 06/10/2020    HDL 56 04/23/2012    LDLCALC 49 06/10/2020    LABVLDL 11 06/10/2020      Lab Results   Component Value Date     06/10/2020     Lab Results   Component Value Date    HGB 11.9 06/10/2020    HCT 34.9 06/10/2020     Lab Results   Component Value Date     06/10/2020     Lab Results   Component Value Date    K 4.1 06/10/2020    K 3.9 05/16/2019       Lab Results   Component Value Date    BUN 16 06/10/2020    CREATININE 0.6 06/10/2020       EKG Interpretation: 7/22/20 Sinus  Bradycardia with Left bundle branch block. ~55 bpm    Image Review:    Echo: 1/25/17   Normal left ventricular size and wall thickness. Low limits of normal left ventricular systolic function with abnormal septal   motion related to IVCD. Estimated EF 50%. E/e'=21.    Trivial mitral regurgitation. 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 Mild tricuspid regurgitation with RVSP estimated at 27 mmHg. Full PFT's:04/05/2019   INTERPRETATION:  Spirometry reveals decreased FVC at 2.1 liters, which  is 69% predicted. FEV1 is decreased at 1.29 liters, which is 56%  predicted. FEV1/FVC ratio is reduced at 61%. Lung volumes reveal  normal total lung capacity, vital capacity and residual volume. Diffusion capacity is reduced at 55% predicted. IMPRESSION:  Moderate restrictive lung disease. Stress study:6/2/20  Summary     Mild anterior hypokinesis with EF 50%     Small-medium sized anterior completely reversible defect of moderate     intensity consistent with ischemia in the territory of the proximal LAD .          Recommendation     Covid testing will be done for urgent cardiac cath which will be schedule     within next week           LHC:6/9/20  Left Heart Cath  Dominance : Right   LM: normal   LAD: tandem 80% mildly calcified proximal stenoses; 50% mid plaque disease  LCx: luminal irregularities  RCA:  Proximal stent with 30-40% in stent restenosis; 50% mid lesion     6Fr XB 3.5 Guide  Runthrough wire to LCx  BMW wire to distal LAD     2.5 x 8 mm Trek predil  2.75 x 20 mm Trek predil      3.0 x 23 mm Xience KYM near ostial LAD     3.5 x 12 mm NC Trek ultimately to 14 SCOOTER at distal edge and 20 SCOOTER at proximal edge  20% proximal residual      Impression/Recommendations:  KYM-proximal LAD  ASA long term  Clopidogrel loaded in lab and for 12 months  Aggrastat for 6 hours with sheath removal thereafter  Continue home medications. Observe in CVU overnight.        Assessment/Plan:     SOB at rest, positional dizziness, unsteadiness and weakness on feet, random, off/on  She presents as a new patient with continued symptoms s/p [facility] with PCI KYM. She says her shortness of breath worsens since she had her PCI she denies any chest tightness or pressure.   etiology of her shortness of breath remains unclear we need to rule out LV dysfunction and associated heart failure, no embolism or significant anemia but I would like to complete CBC, BMP, BNP , D-Dimer as well as complete an echocardiogram to further assess her symptoms. I will make further recommendations after reviewing above-mentioned data    Carotid Bruit  Will complete Bilateral Carotid Duplex to assess for carotid stenosis, she denies hx of TIA/CVA. Medical therapy DAPT, statin and ACEI. Bradycardia  She is currently asymptomatic today. Will continue BB for now with her vascular disease. HTN, essential   This is isolated elevation today here in the office compared to her flow-sheet and home BP readings on lisinopril and Coreg. Work on low sodium diet, walk and space out medical therapy. We will continue to monitor closely. I have advised her to continue to monitor at home. CAD  S/p KYM proximal LAD with residual 50% mid plaque disease LAD, proximal RCA stent with 30-40% in stent restenosis, 50% mid RCA lesion. She denies any further chest pain that she reported prior to her [facility]. s/p PCI with KYM. She continues with SOB at rest, improves with exertion but returns after exertion. complex with hx. of lung disease. Her EKG today shows sinus rhythm with left bundle branch block OMT includes DAPT-Plavix/ASA, Lipitor, Lisinopril and Coreg. Further testing pending. Moderate COPD/Centrilobular Emphysema/former smoker   Last Full PFT 4/2019 moderate restrictive lung disease   Medical therapy includes Anora inhaler, singular, albuterol. Managed per Dr. Kandy Burger and her next follow up 8/25/20  Hyperlipidemia, unspecified   On high potency statin therapy Lipitor 40 mg daily     New Anemia 6/10/20 H/H 11.9/34.9 s/p LHC and L groin-pseudoaneurysm. Will complete CBC to further assess. Current denies any abnormal bruising or bleeding.     We will plan to call her with cardiac workup findings and see her back in a few weeks. Complexity of medical decision making-high. Thank you very much for allowing me to participate in the care of your patient. Please do not hesitate to contact me if you have any questions. Complexity of medical decision making-high  Sincerely,  MD KEVIN [address]ark [address] [place] [address]  Ph: [phone]  Fax: [phone]    This note was scribed in the presence of Dr. Luann Roblero MD by Ludivina Sun RN. Physician Attestation:  The scribes documentation has been prepared under my direction and personally reviewed by me in its entirety. I confirm that the note above accurately reflects all work, treatment, procedures, and medical decision making performed by me.

## 2020-07-22 ENCOUNTER — HOSPITAL ENCOUNTER (OUTPATIENT)
Dept: VASCULAR LAB | Age: 75
Discharge: HOME | End: 2020-07-22
Payer: MEDICARE

## 2020-07-22 ENCOUNTER — OFFICE VISIT (OUTPATIENT)
Dept: CARDIOLOGY CLINIC | Age: 75
End: 2020-07-22
Payer: MEDICARE

## 2020-07-22 ENCOUNTER — HOSPITAL ENCOUNTER (OUTPATIENT)
Dept: NON INVASIVE DIAGNOSTICS | Age: 75
Discharge: HOME | End: 2020-07-22
Payer: MEDICARE

## 2020-07-22 VITALS
TEMPERATURE: 97.9 F | SYSTOLIC BLOOD PRESSURE: 180 MMHG | BODY MASS INDEX: 25.83 KG/M2 | WEIGHT: 155 LBS | HEART RATE: 56 BPM | OXYGEN SATURATION: 98 % | HEIGHT: 65 IN | DIASTOLIC BLOOD PRESSURE: 80 MMHG

## 2020-07-22 DIAGNOSIS — R53.1 WEAKNESS: ICD-10-CM

## 2020-07-22 DIAGNOSIS — R26.81 UNSTEADINESS ON FEET: ICD-10-CM

## 2020-07-22 DIAGNOSIS — R06.02 SOB (SHORTNESS OF BREATH): ICD-10-CM

## 2020-07-22 DIAGNOSIS — R42 DIZZINESS: ICD-10-CM

## 2020-07-22 LAB
ANION GAP SERPL CALCULATED.3IONS-SCNC: 11 MMOL/L (ref 3–16)
BUN BLDV-MCNC: 11 MG/DL (ref 7–20)
CALCIUM SERPL-MCNC: 9.7 MG/DL (ref 8.3–10.6)
CHLORIDE BLD-SCNC: 100 MMOL/L (ref 99–110)
CO2: 27 MMOL/L (ref 21–32)
CREAT SERPL-MCNC: 0.5 MG/DL (ref 0.6–1.2)
D DIMER: <200 NG/ML DDU (ref 0–229)
GFR AFRICAN AMERICAN: >60
GFR NON-AFRICAN AMERICAN: >60
GLUCOSE BLD-MCNC: 96 MG/DL (ref 70–99)
HCT VFR BLD CALC: 41.5 % (ref 36–48)
HEMOGLOBIN: 14.2 G/DL (ref 12–16)
LV EF: 50 %
LVEF MODALITY: NORMAL
MCH RBC QN AUTO: 31.2 PG (ref 26–34)
MCHC RBC AUTO-ENTMCNC: 34.3 G/DL (ref 31–36)
MCV RBC AUTO: 91.1 FL (ref 80–100)
PDW BLD-RTO: 13.3 % (ref 12.4–15.4)
PLATELET # BLD: 259 K/UL (ref 135–450)
PMV BLD AUTO: 7.4 FL (ref 5–10.5)
POTASSIUM SERPL-SCNC: 4.6 MMOL/L (ref 3.5–5.1)
PRO-BNP: 490 PG/ML (ref 0–124)
RBC # BLD: 4.55 M/UL (ref 4–5.2)
SODIUM BLD-SCNC: 138 MMOL/L (ref 136–145)
WBC # BLD: 5.7 K/UL (ref 4–11)

## 2020-07-22 PROCEDURE — G8400 PT W/DXA NO RESULTS DOC: HCPCS | Performed by: INTERNAL MEDICINE

## 2020-07-22 PROCEDURE — 93306 TTE W/DOPPLER COMPLETE: CPT

## 2020-07-22 PROCEDURE — 3017F COLORECTAL CA SCREEN DOC REV: CPT | Performed by: INTERNAL MEDICINE

## 2020-07-22 PROCEDURE — G8417 CALC BMI ABV UP PARAM F/U: HCPCS | Performed by: INTERNAL MEDICINE

## 2020-07-22 PROCEDURE — G9899 SCRN MAM PERF RSLTS DOC: HCPCS | Performed by: INTERNAL MEDICINE

## 2020-07-22 PROCEDURE — 93880 EXTRACRANIAL BILAT STUDY: CPT

## 2020-07-22 PROCEDURE — 93000 ELECTROCARDIOGRAM COMPLETE: CPT | Performed by: INTERNAL MEDICINE

## 2020-07-22 PROCEDURE — 4040F PNEUMOC VAC/ADMIN/RCVD: CPT | Performed by: INTERNAL MEDICINE

## 2020-07-22 PROCEDURE — 1036F TOBACCO NON-USER: CPT | Performed by: INTERNAL MEDICINE

## 2020-07-22 PROCEDURE — G8427 DOCREV CUR MEDS BY ELIG CLIN: HCPCS | Performed by: INTERNAL MEDICINE

## 2020-07-22 PROCEDURE — 99214 OFFICE O/P EST MOD 30 MIN: CPT | Performed by: INTERNAL MEDICINE

## 2020-07-22 PROCEDURE — 1123F ACP DISCUSS/DSCN MKR DOCD: CPT | Performed by: INTERNAL MEDICINE

## 2020-07-22 PROCEDURE — 1090F PRES/ABSN URINE INCON ASSESS: CPT | Performed by: INTERNAL MEDICINE

## 2020-07-22 NOTE — PATIENT INSTRUCTIONS
passes through your body during the test. There is no danger of getting an electrical shock. · You do not receive any radiation. What happens after the test?  · You will probably be able to go home right away. It depends on the reason for the test.  · You can go back to your usual activities right away. Follow-up care is a key part of your treatment and safety. Be sure to make and go to all appointments, and call your doctor if you are having problems. It's also a good idea to keep a list of the medicines you take. Ask your doctor when you can expect to have your test results. Where can you learn more? Go to https://Pellet Technology USApepiceweb.iRidge. org and sign in to your [employer] account. Enter E130 in the [employer] box to learn more about \"Transthoracic Echocardiogram: About This Test.\"     If you do not have an account, please click on the \"Sign Up Now\" link. Current as of: December 16, 2019               Content Version: 12.5  © 5226-0223 [employer]. Care instructions adapted under license by [facility] . If you have questions about a medical condition or this instruction, always ask your healthcare professional. [place] [address] any warranty or liability for your use of this information. Patient Education        Doppler Ultrasound: About This Test  What is it? An ultrasound uses reflected sound waves to produce a picture of organs and blood vessels. The sound waves create a picture on a video screen. Doppler ultrasound is a special kind of ultrasound. It can detect movement of blood through arteries and veins. Some ultrasound tests are called \"duplex. \" Duplex means \"two parts. \" A duplex ultrasound combines the Doppler ultrasound with the more common ultrasound. The combination can help the doctor see more clearly what's going on. There are no risks linked to an ultrasound test, and it is safe for pregnant women.  It won't harm the baby (fetus). Why is this test done? You might have a Doppler ultrasound to:  · Look for reduced blood flow in major neck arteries. Low blood flow in these arteries can cause a stroke. · Find a blood clot in leg veins, which could be a deep vein thrombosis. · Check blood flow in a fetus to check the health of the fetus. · Find a blockage or a narrowing in the arteries that go to the kidneys. How do you prepare for the test?  Depending on what the test is for, you may be asked not to eat or drink after midnight before the test. Or you may be asked to drink water right before the test so that your bladder is full. How is the test done? The doctor or ultrasound technologist will have you lie on your back, side, or stomach, depending on which part of your body is being examined. · A gel will be applied to your skin. This helps the passage of sound waves. · A hand-held device called a transducer will be moved along your skin. · You'll need to stay still during the test.  How long does the test take? The test will take 30 to 60 minutes. What happens after the test?  · The scans from the test will be read within a short time, in case a repeat test is needed. · You will probably be able to go home as soon as the test has been read. · You can go back to your usual activities right away. Follow-up care is a key part of your treatment and safety. Be sure to make and go to all appointments, and call your doctor if you are having problems. It's also a good idea to keep a list of the medicines you take. Ask your doctor when you can expect to have your test results. Where can you learn more? Go to https://Office CenterbabakSmartProcure.Medipacs. org and sign in to your [employer] account. Enter O599 in the [employer] box to learn more about \"Doppler Ultrasound: About This Test.\"     If you do not have an account, please click on the \"Sign Up Now\" link.   Current as of: December 9, 2019               Content Version: Incorporated. Care instructions adapted under license by [facility] ([facility]). If you have questions about a medical condition or this instruction, always ask your healthcare professional. [address] any warranty or liability for your use of this information. Patient Education        Dizziness: Care Instructions  Your Care Instructions  Dizziness is the feeling of unsteadiness or fuzziness in your head. It is different than having vertigo, which is a feeling that the room is spinning or that you are moving or falling. It is also different from lightheadedness, which is the feeling that you are about to faint. It can be hard to know what causes dizziness. Some people feel dizzy when they have migraine headaches. Sometimes bouts of flu can make you feel dizzy. Some medical conditions, such as heart problems or high blood pressure, can make you feel dizzy. Many medicines can cause dizziness, including medicines for high blood pressure, pain, or anxiety. If a medicine causes your symptoms, your doctor may recommend that you stop or change the medicine. If it is a problem with your heart, you may need medicine to help your heart work better. If there is no clear reason for your symptoms, your doctor may suggest watching and waiting for a while to see if the dizziness goes away on its own. Follow-up care is a key part of your treatment and safety. Be sure to make and go to all appointments, and call your doctor if you are having problems. It's also a good idea to know your test results and keep a list of the medicines you take. How can you care for yourself at home? · If your doctor recommends or prescribes medicine, take it exactly as directed. Call your doctor if you think you are having a problem with your medicine. · Do not drive while you feel dizzy. · Try to prevent falls. Steps you can take include:  ?  Using nonskid mats, adding grab bars near the tub, and using night-lights. ? Clearing your home so that walkways are free of anything you might trip on.  ? Letting family and friends know that you have been feeling dizzy. This will help them know how to help you. When should you call for help? [id number] anytime you think you may need emergency care. For example, call if:  · You passed out (lost consciousness). · You have dizziness along with symptoms of a heart attack. These may include:  ? Chest pain or pressure, or a strange feeling in the chest.  ? Sweating. ? Shortness of breath. ? Nausea or vomiting. ? Pain, pressure, or a strange feeling in the back, neck, jaw, or upper belly or in one or both shoulders or arms. ? Lightheadedness or sudden weakness. ? A fast or irregular heartbeat. · You have symptoms of a stroke. These may include:  ? Sudden numbness, tingling, weakness, or loss of movement in your face, arm, or leg, especially on only one side of your body. ? Sudden vision changes. ? Sudden trouble speaking. ? Sudden confusion or trouble understanding simple statements. ? Sudden problems with walking or balance. ? A sudden, severe headache that is different from past headaches. Call your doctor now or seek immediate medical care if:  · You feel dizzy and have a fever, headache, or ringing in your ears. · You have new or increased nausea and vomiting. · Your dizziness does not go away or comes back. Watch closely for changes in your health, and be sure to contact your doctor if:  · You do not get better as expected. Where can you learn more? Go to https://shonda.Vungle. org and sign in to your [employer] account. Enter J490 in the ITADSecurity box to learn more about \"Dizziness: Care Instructions. \"     If you do not have an account, please click on the \"Sign Up Now\" link. Current as of: June 26, 2019               Content Version: 12.5  © 1714-3734 Healthwise, Incorporated.    Care instructions adapted under license by [facility]. If you have questions about a medical condition or this instruction, always ask your healthcare professional. [place] [address] any warranty or liability for your use of this information.

## 2020-07-23 ENCOUNTER — HOSPITAL ENCOUNTER (OUTPATIENT)
Dept: GENERAL RADIOLOGY | Age: 75
Discharge: HOME | End: 2020-07-23
Payer: MEDICARE

## 2020-07-23 ENCOUNTER — HOSPITAL ENCOUNTER (OUTPATIENT)
Age: 75
Discharge: HOME | End: 2020-07-23
Payer: MEDICARE

## 2020-07-23 PROCEDURE — 71046 X-RAY EXAM CHEST 2 VIEWS: CPT

## 2020-08-04 RX ORDER — UMECLIDINIUM BROMIDE AND VILANTEROL TRIFENATATE 62.5; 25 UG/1; UG/1
1 POWDER RESPIRATORY (INHALATION) DAILY
Qty: 60 EACH | Refills: 2 | Status: SHIPPED | OUTPATIENT
Start: 2020-08-04 | End: 2020-11-02

## 2020-08-04 NOTE — TELEPHONE ENCOUNTER
Patient called requesting a refill for anoro  Dosage 62.5-25  Quantity 3  [facility]arm[facility]? [facility]  Last OV with provider? 6/8/20  Next scheduled appt?  8/25/20

## 2020-08-14 RX ORDER — LISINOPRIL 20 MG/1
20 TABLET ORAL DAILY
Qty: 90 TABLET | Refills: 3 | Status: SHIPPED | OUTPATIENT
Start: 2020-08-14 | End: 2021-02-18 | Stop reason: SDUPTHER

## 2020-08-26 ENCOUNTER — OFFICE VISIT (OUTPATIENT)
Dept: CARDIOLOGY CLINIC | Age: 75
End: 2020-08-26
Payer: MEDICARE

## 2020-08-26 VITALS
SYSTOLIC BLOOD PRESSURE: 138 MMHG | HEIGHT: 65 IN | HEART RATE: 58 BPM | TEMPERATURE: 97.5 F | BODY MASS INDEX: 25.83 KG/M2 | OXYGEN SATURATION: 98 % | DIASTOLIC BLOOD PRESSURE: 78 MMHG | WEIGHT: 155 LBS

## 2020-08-26 PROCEDURE — 1036F TOBACCO NON-USER: CPT | Performed by: INTERNAL MEDICINE

## 2020-08-26 PROCEDURE — 3017F COLORECTAL CA SCREEN DOC REV: CPT | Performed by: INTERNAL MEDICINE

## 2020-08-26 PROCEDURE — G8400 PT W/DXA NO RESULTS DOC: HCPCS | Performed by: INTERNAL MEDICINE

## 2020-08-26 PROCEDURE — 4040F PNEUMOC VAC/ADMIN/RCVD: CPT | Performed by: INTERNAL MEDICINE

## 2020-08-26 PROCEDURE — G8427 DOCREV CUR MEDS BY ELIG CLIN: HCPCS | Performed by: INTERNAL MEDICINE

## 2020-08-26 PROCEDURE — G8417 CALC BMI ABV UP PARAM F/U: HCPCS | Performed by: INTERNAL MEDICINE

## 2020-08-26 PROCEDURE — 1090F PRES/ABSN URINE INCON ASSESS: CPT | Performed by: INTERNAL MEDICINE

## 2020-08-26 PROCEDURE — 1123F ACP DISCUSS/DSCN MKR DOCD: CPT | Performed by: INTERNAL MEDICINE

## 2020-08-26 PROCEDURE — 93000 ELECTROCARDIOGRAM COMPLETE: CPT | Performed by: INTERNAL MEDICINE

## 2020-08-26 PROCEDURE — G9899 SCRN MAM PERF RSLTS DOC: HCPCS | Performed by: INTERNAL MEDICINE

## 2020-08-26 PROCEDURE — 99214 OFFICE O/P EST MOD 30 MIN: CPT | Performed by: INTERNAL MEDICINE

## 2020-08-26 NOTE — PROGRESS NOTES
[address]  Cardiology Progress Note     Miquel Kee  [id number]    August 26, 2020    CC: \"I am feeling better. \"     HPI:  The patient is 77 y.o. female with a past medical history significant for COPD/emphysema, CAD, CMP, HTN, HLD presents per Dr. Charley Donohue as a second opinion for ongoing off/on SOB and dizziness. Primary Cardiologist Dr. Gabriela Morgan and seen in the [facility] 6/1/20 for left sided chest pain x2 weeks, most notiable in bed with some exertional component. As reported, she has chronic mild CONNOR and was unchanged at that time. She had also reported increase in life stressors with a recent move. Stress study was completed on 6/2/20 which was abnormal. She was set up for a [facility] 6/9/20 revealing 80% proximal LAD stenosis s/p Xience KYM near ostial LAD with residual disease per Dr Anya Chin. She presents today per Dr. Charley Donohue for further assessment of SOB and dizziness that occurs off/on. At her last office visit on 7/22/20, she reported that she had not \"felt good since my left heart cath. \" She reported worsening  positional dizziness from getting up from a seated position, weakness and unsteadiness walking, left arm numbness and tingling down to fingers since her C, off/on, random. She admited that she continues with chronic SOB at rest, not with exertion, she felt is due to her COPD/former smoker-quit 25 years ago and has been unchanged since her [facility] and continues to worry that it is her heart. Denied PND, orthopnea or sleeping issues. No BLE edema. She denied heart racing, fluttering, palpitations. She felt that she is tolerating her medical therapy and reports compliance. She also denies any abnormal bruising or bleeding on DAPT. Reviewed allergies. Today, she reports that on Monday she had 6 bouts of diarrhea and had chest pain around that time. She denies any other cardiac sounding complaints today.  She does offer that her  just past away and she has been extremely stressed. She reports medical therapy compliance and feels she is tolerating. She denies any abnormal bruising or bleeding on DAPT. Patient denies any symptoms of exertional chest pain, pressure, tightness, shortness of breath at rest, CONNOR, nausea, vomiting, near syncope, syncope, heart racing, palpitations, dizziness, lightheaded, PND, orthopnea, wheezing, diaphoresis, BLE edema, bilateral lower extremities pain, cramping or fatigue. Past Medical History:   Diagnosis Date    Bruising     CAD (coronary artery disease)     Cardiomyopathy ([address]     Emphysema     Hyperlipidemia     Hypertension     Myocardial infarct [facility])      Past Surgical History:   Procedure Laterality Date    APPENDECTOMY      BREAST BIOPSY      CARDIAC SURGERY      RCA    CHOLECYSTECTOMY      COLONOSCOPY      HYSTEROSCOPY  2016    HYSTEROSCOPY DILATATION AND CURETTAGE WITH MYOSURE    SUBCLAVIAN BYPASS GRAFT      chest,     TUBAL LIGATION       Family History   Problem Relation Age of Onset    Heart Failure Mother     Emphysema Mother     Heart Disease Mother     Liver Disease Father     Cancer Father      Social History     Tobacco Use    Smoking status: Former Smoker     Packs/day: 1.00     Years: 37.00     Pack years: 37.00     Last attempt to quit: 1994     Years since quittin.9    Smokeless tobacco: Never Used    Tobacco comment: started at age 16 / smoked up to 1 p.p.d / smoked for 37 yrs   Substance Use Topics    Alcohol use:  Yes     Alcohol/week: 2.0 standard drinks     Types: 2 Glasses of wine per week     Comment: OCCAS    Drug use: No       Allergies   Allergen Reactions    Darvon [Propoxyphene Hcl]      \"OUT OF BODY EXPERIENCE\"    Minocin [Minocycline Hcl]      BLADDER INFECTION    Minocycline     Propoxyphene     Oseltamivir Nausea And Vomiting    Tamiflu [Oseltamivir Phosphate] Nausea And Vomiting     Current Outpatient Medications   Medication Sig Dispense Refill    lisinopril (PRINIVIL;ZESTRIL) 20 MG tablet Take 1 tablet by mouth daily 90 tablet 3    umeclidinium-vilanterol (ANORO ELLIPTA) 62.5-25 MCG/INH AEPB inhaler Inhale 1 puff into the lungs daily 60 each 2    clopidogrel (PLAVIX) 75 MG tablet Take 1 tablet by mouth daily 30 tablet 3    aspirin 81 MG EC tablet Take 81 mg by mouth daily      UNABLE TO FIND 600 mg Immune- Assist      atorvastatin (LIPITOR) 40 MG tablet TAKE ONE TABLET BY MOUTH DAILY 90 tablet 4    carvedilol (COREG) 12.5 MG tablet Take 1 tablet by mouth 2 times daily 180 tablet 3    albuterol sulfate  (90 Base) MCG/ACT inhaler Inhale 2 puffs into the lungs every 6 hours as needed for Wheezing 1 Inhaler 2    montelukast (SINGULAIR) 10 MG tablet Take 10 mg by mouth nightly. No current facility-administered medications for this visit. Review of Systems:  · Constitutional: no unanticipated weight loss. There's been no change in energy level, sleep pattern, or activity level. No fevers, chills. · Eyes: No visual changes or diplopia. No scleral icterus. · ENT: No Headaches, hearing loss or vertigo. No mouth sores or sore throat. · Cardiovascular: as reviewed in HPI  · Respiratory: No cough or wheezing, no sputum production. No hematemesis. · Gastrointestinal: No abdominal pain, appetite loss, blood in stools. No change in bowel or bladder habits. · Genitourinary: No dysuria, trouble voiding, or hematuria. · Musculoskeletal:  No gait disturbance, no joint complaints. · Integumentary: No rash or pruritis. · Neurological: No headache, diplopia, change in muscle strength, numbness or tingling. · Psychiatric: No anxiety or depression. · Endocrine: No temperature intolerance. No excessive thirst, fluid intake, or urination. No tremor. · Hematologic/Lymphatic: No abnormal bruising or bleeding, blood clots or swollen lymph nodes. · Allergic/Immunologic: No nasal congestion or hives.     Physical Exam:   Vitals: 08/26/20 1518 08/26/20 1524   BP: (!) 146/80 138/78   Pulse: 58    Temp: 97.5 °F (36.4 °C)    SpO2: 98%    Weight: 155 lb (70.3 kg)    Height: 5' 5\" (1.651 m)      Wt Readings from Last 3 Encounters:   07/22/20 155 lb (70.3 kg)   06/19/20 156 lb (70.8 kg)   06/10/20 153 lb 10.6 oz (69.7 kg)     Constitutional: She is oriented to person, place, and time. She appears well-developed and well-nourished. In no acute distress. Head: Normocephalic and atraumatic. Pupils equal and round. Neck: Neck supple. No JVP. Bilateral + carotid bruit appreciated. No mass and no thyromegaly present. No lymphadenopathy present. Cardiovascular: Normal rate. Normal heart sounds patient has split second heart sound. Exam reveals no gallop and no friction rub. No murmur heard. Pulmonary/Chest: Effort normal and breath sounds normal. No respiratory distress. She has no wheezes, rhonchi or rales. Abdominal: Soft, non-tender. Bowel sounds are normal. She exhibits no organomegaly, mass or bruit. Extremities: No edema, cyanosis, or clubbing. Pulses are 2+ radial/dorsalis pedis/posterior tibial/carotid bilaterally. Neurological: No gross cranial nerve deficit. Coordination normal.   Skin: Skin is warm and dry. There is no rash or diaphoresis. Psychiatric: She has a normal mood and affect.  Her speech is normal and behavior is normal.     Lab Review:     Lab Results   Component Value Date    TRIG 55 06/10/2020    HDL 45 06/10/2020    HDL 56 04/23/2012    LDLCALC 49 06/10/2020    LABVLDL 11 06/10/2020      Lab Results   Component Value Date     07/22/2020     Lab Results   Component Value Date    HGB 14.2 07/22/2020    HCT 41.5 07/22/2020     Lab Results   Component Value Date     07/22/2020     Lab Results   Component Value Date    K 4.6 07/22/2020    K 3.9 05/16/2019       Lab Results   Component Value Date    BUN 11 07/22/2020    CREATININE 0.5 07/22/2020       EKG Interpretation:   7/22/20 Sinus  Bradycardia with Left CVU overnight.      Carotid Duplex:7/22/20  Right Impression    The right vertebral artery demonstrates abnormal to-fro flow. The right subclavian artery is visualized and demonstrates monophasic flow. possible stenosis of the brachiocephalic artery. Left Impression    The left internal carotid artery demonstrates no significant stenosis. The left vertebral artery demonstrates normal antegrade flow. The left subclavian artery is visualized and demonstrates triphasic flow. Carotid bulb appears to have homogenous plaque formation.           Echo:7/22/20  Summary   There is mild concentric left ventricular hypertrophy. Endocardium is not well visualized, EF may be reduced    50%, however   recommend repeat study with definity   Grade I diastolic dysfunction with normal LV filling pressures. Assessment/Plan:     SOB at rest, positional dizziness, unsteadiness and weakness on feet, random, off/on  Today, she reports that it took about 10 weeks for all of her symptoms to resolve post LHC with PCI. She denies any angina today. We had complete CBC, BMP, BNP , D-Dimer-all wnl 7/22/20 as well as complete an echocardiogram 7/22/20 revealing LVEF 50% and grad I diastolic dysfunction with recs to repeat with definity. She appears euvolemic on clinical examination today    CAD  She currently denies any symptoms of chest tightness pressure with exertion. S/p KYM proximal LAD with residual 50% mid plaque disease LAD, proximal RCA stent with 30-40% in stent restenosis, 50% mid RCA lesion. Her labs and echo 7/22/20 stable with grade I DD and LVEF 50%. She denies any further angina. EKG today SB with LBBB. OMT includes DAPT-Plavix/ASA, Lipitor, Lisinopril and Coreg. HTN, essential   BP today mildly elevated today on lisinopril and Coreg. Work on low sodium diet, walk and space out medical therapy. We will continue to monitor closely. I have advised her to continue to monitor at home. Hyperlipidemia, unspecified   On high potency statin therapy Lipitor 40 mg daily. Last lipid profile 6/10/20 wnl with LDLc at goal 49. Right Brachiocephalic artery stenosis  Per duplex study 7/22/20. No treatment needed at this time. Bilateral Carotid Bruit  7/22/20 Bilateral Carotid Duplex with no carotid stenosis but revealed R brachiocephalic artery stenosis. she denies hx of TIA/CVA. Medical therapy DAPT, statin and ACEI. Bradycardia  She is currently asymptomatic today. Will continue BB for now with her vascular disease. Moderate COPD/Centrilobular Emphysema/former smoker   Last Full PFT 4/2019 moderate restrictive lung disease. Medical therapy includes Anora inhaler, singular, albuterol. Managed per Dr. Carli Calvo and next follow up 8/25/20    New Anemia 6/10/20 H/H 11.9/34.9, repeat CBC 7/22/20 wnl. s/p LHC and L groin-pseudoaneurysm. Current denies any abnormal bruising or bleeding. We will plan 4 month follow up. Thank you very much for allowing me to participate in the care of your patient. Please do not hesitate to contact me if you have any questions. Complexity of medical decision making-high  Sincerely,  Nickie Fraser MD      [facility], [address] [place], [place] [address]way, [facility] [address]  Ph: [phone]  Fax: [phone]    This note was scribed in the presence of Dr. Deion Mohamud MD by Reny Curiel RN.

## 2020-08-26 NOTE — PATIENT INSTRUCTIONS
Patient Education        Learning About Coronary Artery Disease (CAD)  What is coronary artery disease? Coronary artery disease (CAD) occurs when plaque builds up in the arteries that bring oxygen-rich blood to your heart. Plaque is a fatty substance made of cholesterol, calcium, and other substances in the blood. This process is called hardening of the arteries, or atherosclerosis. What happens when you have coronary artery disease? · Plaque may narrow the coronary arteries. Narrowed arteries cause poor blood flow. This can lead to angina symptoms such as chest pain or discomfort. If blood flow is completely blocked, you could have a heart attack. · You can slow CAD and reduce the risk of future problems by making changes in your lifestyle. These include quitting smoking and eating heart-healthy foods. · Treatments for CAD, along with changes in your lifestyle, can help you live a longer and healthier life. How can you prevent coronary artery disease? · Do not smoke. It may be the best thing you can do to prevent heart disease. If you need help quitting, talk to your doctor about stop-smoking programs and medicines. These can increase your chances of quitting for good. · Be active. Get at least 30 minutes of exercise on most days of the week. Walking is a good choice. You also may want to do other activities, such as running, swimming, cycling, or playing tennis or team sports. · Eat heart-healthy foods. Eat more fruits and vegetables and less foods that contain saturated and trans fats. Limit alcohol, sodium, and sweets. · Stay at a healthy weight. Lose weight if you need to. · Manage other health problems such as diabetes, high blood pressure, and high cholesterol. How is coronary artery disease treated? · Your doctor will suggest that you make lifestyle changes. For example, your doctor may ask you to eat healthy foods, quit smoking, lose extra weight, and be more active.   · You will have to take medicines. · Your doctor may suggest a procedure to open narrowed or blocked arteries. This is called angioplasty. Or your doctor may suggest using healthy blood vessels to create detours around narrowed or blocked arteries. This is called bypass surgery. Follow-up care is a key part of your treatment and safety. Be sure to make and go to all appointments, and call your doctor if you are having problems. It's also a good idea to know your test results and keep a list of the medicines you take. Where can you learn more? Go to https://MunchkinpepicewCognitics.[employer]tLESS Community. org and sign in to your [employer]ter[employer] account. Enter [phone] in the [facility] box to learn more about \"Learning About Coronary Artery Disease (CAD). \"     If you do not have an account, please click on the \"Sign Up Now\" link. Current as of: December 16, 2019               Content Version: 12.5  © 7665-5455 [employer]. Care instructions adapted under license by [facility] ([facility]). If you have questions about a medical condition or this instruction, always ask your healthcare professional. [place] [address] any warranty or liability for your use of this information. Patient Education        Heart-Healthy Diet: Care Instructions  Your Care Instructions     A heart-healthy diet has lots of vegetables, fruits, nuts, beans, and whole grains, and is low in salt. It limits foods that are high in saturated fat, such as meats, cheeses, and fried foods. It may be hard to change your diet, but even small changes can lower your risk of heart attack and heart disease. Follow-up care is a key part of your treatment and safety. Be sure to make and go to all appointments, and call your doctor if you are having problems. It's also a good idea to know your test results and keep a list of the medicines you take. How can you care for yourself at home? Watch your portions  · Learn what a serving is.  A \"serving\" and a \"portion\" bread, and brown rice. · Buy whole-grain breads and cereals, instead of white bread or pastries. Limit salt and sodium  · Limit how much salt and sodium you eat to help lower your blood pressure. · Taste food before you salt it. Add only a little salt when you think you need it. With time, your taste buds will adjust to less salt. · Eat fewer snack items, fast foods, and other high-salt, processed foods. Check food labels for the amount of sodium in packaged foods. · Choose low-sodium versions of canned goods (such as soups, vegetables, and beans). Limit sugar  · Limit drinks and foods with added sugar. These include candy, desserts, and soda pop. Limit alcohol  · Limit alcohol to no more than 2 drinks a day for men and 1 drink a day for women. Too much alcohol can cause health problems. When should you call for help? Watch closely for changes in your health, and be sure to contact your doctor if:  · You would like help planning heart-healthy meals. Where can you learn more? Go to https://MostLikely.[employer]. org and sign in to your [employer] account. Enter V137 in the [employer] box to learn more about \"Heart-Healthy Diet: Care Instructions. \"     If you do not have an account, please click on the \"Sign Up Now\" link. Current as of: August 22, 2019               Content Version: 12.5  © 1704-6589 Healthwise, Incorporated. Care instructions adapted under license by [facility] ([facility]). If you have questions about a medical condition or this instruction, always ask your healthcare professional. [place] [address] any warranty or liability for your use of this information. Patient Education        Learning About Low-Sodium Foods  What foods are low in sodium? The foods you eat contain nutrients, such as vitamins and minerals. Sodium is a nutrient. Your body needs the right amount to stay healthy and work as it should.  You can use the list below to help you make choices about which foods to eat. Fruits  · Fresh, frozen, canned, or dried fruit  Vegetables  · Fresh or frozen vegetables, with no added salt  · Canned vegetables, low-sodium or with no added salt  Grains  · Bagels without salted tops  · Cereal with no added salt  · Corn tortillas  · Crackers with no added salt  · Oatmeal, cooked without salt  · Popcorn with no salt  · Pasta and noodles, cooked without salt  · Rice, cooked without salt  · Unsalted pretzels  Dairy and dairy alternatives  · Butter, unsalted  · Cream cheese  · Ice cream  · Milk  · Soy milk  Meats and other protein foods  · Beans and peas, canned with no salt  · Eggs  · Fresh fish (not smoked)  · Fresh meats (not smoked or cured)  · Nuts and nut butter, prepared without salt  · Poultry, not packaged with sodium solution  · Tofu, unseasoned  · Tuna, canned without salt  Seasonings  · Garlic  · Herbs and spices  · Lemon juice  · Mustard  · Olive oil  · Salt-free seasoning mixes  · Vinegar  Work with your doctor to find out how much of this nutrient you need. Depending on your health, you may need more or less of it in your diet. Where can you learn more? Go to https://[employer] Warrior Wellness CollectivepeSnehtaeb.Curves. org and sign in to your [employer] account. Enter C579 in the [facility] box to learn more about \"Learning About Low-Sodium Foods. \"     If you do not have an account, please click on the \"Sign Up Now\" link. Current as of: August 22, 2019               Content Version: 12.5  © 1111-3445 Healthwise, Incorporated. Care instructions adapted under license by [facility] ([facility]). If you have questions about a medical condition or this instruction, always ask your healthcare professional. [place] [address] any warranty or liability for your use of this information.

## 2020-09-08 ENCOUNTER — OFFICE VISIT (OUTPATIENT)
Dept: PULMONOLOGY | Age: 75
End: 2020-09-08
Payer: MEDICARE

## 2020-09-08 VITALS
WEIGHT: 156 LBS | BODY MASS INDEX: 25.99 KG/M2 | SYSTOLIC BLOOD PRESSURE: 137 MMHG | OXYGEN SATURATION: 97 % | HEART RATE: 62 BPM | RESPIRATION RATE: 16 BRPM | HEIGHT: 65 IN | DIASTOLIC BLOOD PRESSURE: 61 MMHG | TEMPERATURE: 98.1 F

## 2020-09-08 PROCEDURE — 4040F PNEUMOC VAC/ADMIN/RCVD: CPT | Performed by: INTERNAL MEDICINE

## 2020-09-08 PROCEDURE — 1090F PRES/ABSN URINE INCON ASSESS: CPT | Performed by: INTERNAL MEDICINE

## 2020-09-08 PROCEDURE — 99213 OFFICE O/P EST LOW 20 MIN: CPT | Performed by: INTERNAL MEDICINE

## 2020-09-08 PROCEDURE — 1036F TOBACCO NON-USER: CPT | Performed by: INTERNAL MEDICINE

## 2020-09-08 PROCEDURE — G8427 DOCREV CUR MEDS BY ELIG CLIN: HCPCS | Performed by: INTERNAL MEDICINE

## 2020-09-08 PROCEDURE — G8417 CALC BMI ABV UP PARAM F/U: HCPCS | Performed by: INTERNAL MEDICINE

## 2020-09-08 PROCEDURE — G8926 SPIRO NO PERF OR DOC: HCPCS | Performed by: INTERNAL MEDICINE

## 2020-09-08 PROCEDURE — 1123F ACP DISCUSS/DSCN MKR DOCD: CPT | Performed by: INTERNAL MEDICINE

## 2020-09-08 PROCEDURE — 3017F COLORECTAL CA SCREEN DOC REV: CPT | Performed by: INTERNAL MEDICINE

## 2020-09-08 PROCEDURE — G9899 SCRN MAM PERF RSLTS DOC: HCPCS | Performed by: INTERNAL MEDICINE

## 2020-09-08 PROCEDURE — G8400 PT W/DXA NO RESULTS DOC: HCPCS | Performed by: INTERNAL MEDICINE

## 2020-09-08 PROCEDURE — 3023F SPIROM DOC REV: CPT | Performed by: INTERNAL MEDICINE

## 2020-09-08 NOTE — ASSESSMENT & PLAN NOTE
-FEV1 50%, no recent exacerbations/hospitalizations  -Continue Anoro daily, albuterol as needed  -Has quit smoking

## 2020-09-08 NOTE — PROGRESS NOTES
medications for this visit. Data Reviewed:   CBC and Renal reviewed  Last CBC  Lab Results   Component Value Date    WBC 5.7 07/22/2020    RBC 4.55 07/22/2020    HGB 14.2 07/22/2020    MCV 91.1 07/22/2020     07/22/2020     Last Renal  Lab Results   Component Value Date     07/22/2020    K 4.6 07/22/2020    K 3.9 05/16/2019     07/22/2020    CO2 27 07/22/2020    CO2 24 06/10/2020    CO2 28 06/09/2020    BUN 11 07/22/2020    CREATININE 0.5 07/22/2020    GLUCOSE 96 07/22/2020    CALCIUM 9.7 07/22/2020       Last ABG  POC Blood Gas: No results found for: POCPH, POCPCO2, POCPO2, POCHCO3, NBEA, ZTTT5LMV  No results for input(s): PH, PCO2, PO2, HCO3, BE, O2SAT in the last 72 hours. Radiology Review:  Pertinent images / reports were reviewed as a part of this visit. CT Chest w/ contrast: No results found for this or any previous visit. CT Chest w/o contrast:   Results for orders placed during the hospital encounter of 05/14/18   CT CHEST WO CONTRAST    Narrative EXAMINATION:  CT OF THE CHEST WITHOUT CONTRAST 5/14/2018 9:48 am    TECHNIQUE:  CT of the chest was performed without the administration of intravenous  contrast. Multiplanar reformatted images are provided for review. Dose  modulation, iterative reconstruction, and/or weight based adjustment of the  mA/kV was utilized to reduce the radiation dose to as low as reasonably  achievable. COMPARISON:  01/25/2017    HISTORY:  ORDERING PHYSICIAN PROVIDED HISTORY: Shortness of breath  TECHNOLOGIST PROVIDED HISTORY:  Technologist Provided Reason for Exam: lll pain, abd. cxr  Acuity: Acute  Type of Encounter: Subsequent/Follow-up  Relevant Medical/Surgical History: no surg, or hx. ca    FINDINGS:  Mediastinum: Thyroid gland appears normal.  Atherosclerotic change is seen in  the aorta. Coronary artery calcification is seen. Small hiatal hernia is  seen. There is nonspecific thickening at the GE junction.   .  Rim Calcified  aneurysm at the thoracic inlet is unchanged posteriorly on the right,  measuring 1.8 cm    Lungs/pleura: Linear and curvilinear opacities are seen in the apices. There  is underlying emphysema. No pneumonia. No edema. No pleural effusions    Calcified granuloma is seen in the right lower lobe. Punctate nodule right lower lobe is unchanged measuring 2-3 mm. Punctate  nodule left upper lobe is also unchanged. .  Tiny punctate nodule right  upper lobe is also unchanged    Trace pleural fluid is seen on the right.  , incidentally noted    Upper Abdomen: Mild clips are seen from prior cholecystectomy. Scattered  hypodense nodules are seen throughout the liver, measuring 1.8 cm in size or  less. , likely cyst.  Mild biliary ductal dilatation is unchanged    Soft Tissues/Bones: Axillary bypass graft is seen. Spurring is seen in the spine      Impression Mild emphysema with a few tiny punctate nodules, similar to prior. No focal  pneumonia. No pulmonary edema         CTPA: No results found for this or any previous visit. CXR PA/LAT:   Results for orders placed during the hospital encounter of 05/31/19   XR CHEST STANDARD (2 VW)    Narrative EXAMINATION:  TWO XRAY VIEWS OF THE CHEST    5/31/2019 8:01 am    COMPARISON:  05/19/2019    HISTORY:  ORDERING SYSTEM PROVIDED HISTORY: Cough  TECHNOLOGIST PROVIDED HISTORY:  Ordering Physician Provided Reason for Exam: pt has a cough since Mother's  Day. pt states she was hospitalized with pneumonia on 05/15  Acuity: Acute  Type of Exam: Subsequent/Follow-up  Relevant Medical/Surgical History: asthma and COPD    FINDINGS:  Cardiac silhouette, mediastinal hilar contours are normal.  There is no focal  airspace disease or pleural effusion. No pneumothorax is identified. Surgical staples project over the infraclavicular regions, consistent with  previous bypass grafts. Impression No acute cardiopulmonary disease.          CXR portable:   Results for orders placed during the hospital encounter of 05/15/19   XR CHEST PORTABLE    Narrative EXAMINATION:  ONE XRAY VIEW OF THE CHEST    5/15/2019 10:10 am    COMPARISON:  12/29/2017    HISTORY:  ORDERING SYSTEM PROVIDED HISTORY: chest tightness  TECHNOLOGIST PROVIDED HISTORY:  Reason for exam:->chest tightness  Ordering Physician Provided Reason for Exam: Influenza (dx yesterday with flu  has been taking tamiflu since yesterday )  Acuity: Acute  Type of Exam: Ongoing    FINDINGS:  Cardiac leads project over the chest.  Hazy opacity seen in the left base  with decreased definition of the lateral left hemidiaphragm. No gross  pneumothorax. Cardiac and mediastinal silhouettes are similar to prior. Right upper quadrant clips. Rim calcification is seen at the soft tissues of  the left lateral chest wall. Clips are seen projecting over the left apex. Impression Minimal left basilar atelectasis or pneumonitis. Pulmonary function testing  Moderate Obstruction with FEV1 58%    Assessment/Plan:       Diagnosis Orders   1. Moderate COPD (chronic obstructive pulmonary disease) ([address]     2. Tobacco abuse           Problem List Items Addressed This Visit        Pulmonary Problems    Moderate COPD (chronic obstructive pulmonary disease) ([address] - Primary     -FEV1 50%, no recent exacerbations/hospitalizations  -Continue Anoro daily, albuterol as needed  -Has quit smoking            Other    Tobacco abuse     Quit greater than 20 years ago                This note was transcribed using [id number]. Please disregard any translational errors.     Shamika Cordero [address] [place] [address] Pulmonary, Sleep and Critical Care

## 2020-09-11 ENCOUNTER — HOSPITAL ENCOUNTER (OUTPATIENT)
Dept: WOMENS IMAGING | Age: 75
Discharge: HOME | End: 2020-09-11
Payer: MEDICARE

## 2020-09-11 PROCEDURE — G0279 TOMOSYNTHESIS, MAMMO: HCPCS

## 2020-09-29 RX ORDER — CLOPIDOGREL BISULFATE 75 MG/1
75 TABLET ORAL DAILY
Qty: 90 TABLET | Refills: 4 | Status: SHIPPED | OUTPATIENT
Start: 2020-09-29 | End: 2021-10-19

## 2020-09-29 NOTE — TELEPHONE ENCOUNTER
Medication Refill    clopidogrel (PLAVIX) 75 MG tablet  Take 1 tablet by mouth daily     30 days    [address] [place] [facility]

## 2020-11-02 RX ORDER — UMECLIDINIUM BROMIDE AND VILANTEROL TRIFENATATE 62.5; 25 UG/1; UG/1
1 POWDER RESPIRATORY (INHALATION) DAILY
Qty: 30 PUFF | Refills: 3 | Status: SHIPPED | OUTPATIENT
Start: 2020-11-02 | End: 2021-02-25

## 2020-12-16 RX ORDER — ATORVASTATIN CALCIUM 40 MG/1
TABLET, FILM COATED ORAL
Qty: 90 TABLET | Refills: 2 | Status: SHIPPED | OUTPATIENT
Start: 2020-12-16 | End: 2020-12-16 | Stop reason: SDUPTHER

## 2020-12-16 RX ORDER — ATORVASTATIN CALCIUM 40 MG/1
40 TABLET, FILM COATED ORAL DAILY
Qty: 90 TABLET | Refills: 3 | Status: SHIPPED | OUTPATIENT
Start: 2020-12-16 | End: 2021-12-15 | Stop reason: SDUPTHER

## 2020-12-16 NOTE — TELEPHONE ENCOUNTER
Medication Refill    atorvastatin (LIPITOR) 40 MG tablet  TAKE ONE TABLET BY MOUTH DAILY    90 days    [place], [place] - [address] [address]   Phone:  [phone]  Fax:  [phone]    *Pt states Dr El Vaz prescribed this for her but she no longer see's him.

## 2020-12-16 NOTE — TELEPHONE ENCOUNTER
Last OV 8/26/20. Next OV 12/21/20. Lipid panel 6/10/20. CMP 9/6/19. Placed order in Epic for fasting lab work  [employer] patient and requested that she have lab work prior to upcoming appointment. Patient verbalized and confirmed understanding.

## 2020-12-18 DIAGNOSIS — I25.10 CORONARY ARTERY DISEASE INVOLVING NATIVE CORONARY ARTERY OF NATIVE HEART WITHOUT ANGINA PECTORIS: Chronic | ICD-10-CM

## 2020-12-18 LAB
A/G RATIO: 2 (ref 1.1–2.2)
ALBUMIN SERPL-MCNC: 4.3 G/DL (ref 3.4–5)
ALP BLD-CCNC: 87 U/L (ref 40–129)
ALT SERPL-CCNC: 15 U/L (ref 10–40)
ANION GAP SERPL CALCULATED.3IONS-SCNC: 8 MMOL/L (ref 3–16)
AST SERPL-CCNC: 18 U/L (ref 15–37)
BILIRUB SERPL-MCNC: 0.9 MG/DL (ref 0–1)
BUN BLDV-MCNC: 9 MG/DL (ref 7–20)
CALCIUM SERPL-MCNC: 9.4 MG/DL (ref 8.3–10.6)
CHLORIDE BLD-SCNC: 102 MMOL/L (ref 99–110)
CHOLESTEROL, FASTING: 141 MG/DL (ref 0–199)
CO2: 29 MMOL/L (ref 21–32)
CREAT SERPL-MCNC: 0.5 MG/DL (ref 0.6–1.2)
GFR AFRICAN AMERICAN: >60
GFR NON-AFRICAN AMERICAN: >60
GLOBULIN: 2.1 G/DL
GLUCOSE FASTING: 104 MG/DL (ref 70–99)
HDLC SERPL-MCNC: 64 MG/DL (ref 40–60)
LDL CHOLESTEROL CALCULATED: 60 MG/DL
POTASSIUM SERPL-SCNC: 4.9 MMOL/L (ref 3.5–5.1)
SODIUM BLD-SCNC: 139 MMOL/L (ref 136–145)
TOTAL PROTEIN: 6.4 G/DL (ref 6.4–8.2)
TRIGLYCERIDE, FASTING: 87 MG/DL (ref 0–150)
VLDLC SERPL CALC-MCNC: 17 MG/DL

## 2020-12-18 NOTE — PROGRESS NOTES
[address]  Cardiology Progress Note     Huan Jolley  1945 December 21, 2020    CC: \"I am doing great. \"     HPI:  The patient is 77 y.o. female with a past medical history significant for COPD/emphysema, CAD, CMP, HTN, HLD presents per Dr. Elaine Cameron as a second opinion for ongoing off/on SOB and dizziness. Primary Cardiologist Dr. Aron Wang and seen in the [facility] 6/1/20 for left sided chest pain x2 weeks, most notiable in bed with some exertional component. As reported, she has chronic mild CONNOR and was unchanged at that time. She had also reported increase in life stressors with a recent move. Stress study was completed on 6/2/20 which was abnormal. She was set up for a [facility] 6/9/20 revealing 80% proximal LAD stenosis s/p Xience KYM near ostial LAD with residual disease per Dr Maxine Abdullahi. She presents today per Dr. Elaine Cameron for further assessment of SOB and dizziness that occurs off/on. Today, she is here for follow up. She says that she is doing great. She did lose her  in August due to Covid. She is coping with the stress well. She has a lot of energy. Patient denies exertional chest pain/pressure, dyspnea at rest, CONNOR, PND, orthopnea, palpitations, lightheadedness, weight changes, changes in LE edema, and syncope. Patient reports compliance to her medications.      Past Medical History:   Diagnosis Date    Bruising     CAD (coronary artery disease)     Cardiomyopathy ([address]     Emphysema     Hyperlipidemia     Hypertension     Myocardial infarct [facility])      Past Surgical History:   Procedure Laterality Date    APPENDECTOMY      BREAST BIOPSY      CARDIAC SURGERY  2009    RCA    CHOLECYSTECTOMY      COLONOSCOPY      HYSTEROSCOPY  09/09/2016    HYSTEROSCOPY DILATATION AND CURETTAGE WITH MYOSURE    SUBCLAVIAN BYPASS GRAFT  1989    chest, 1989    TUBAL LIGATION       Family History   Problem Relation Age of Onset    Heart Failure Mother     Emphysema Mother     Heart Disease Mother     Liver Disease Father     Cancer Father      Social History     Tobacco Use    Smoking status: Former Smoker     Packs/day: 1.00     Years: 37.00     Pack years: 37.00     Quit date: 1994     Years since quittin.3    Smokeless tobacco: Never Used    Tobacco comment: started at age 16 / smoked up to 1 p.p.d / smoked for 37 yrs   Substance Use Topics    Alcohol use: Yes     Alcohol/week: 2.0 standard drinks     Types: 2 Glasses of wine per week     Comment: OCCAS    Drug use: No       Allergies   Allergen Reactions    Darvon [Propoxyphene Hcl]      \"OUT OF BODY EXPERIENCE\"    Minocin [Minocycline Hcl]      BLADDER INFECTION    Minocycline     Propoxyphene     Oseltamivir Nausea And Vomiting    Tamiflu [Oseltamivir Phosphate] Nausea And Vomiting     Current Outpatient Medications   Medication Sig Dispense Refill    Probiotic Product (PROBIOTIC PO) Take by mouth daily      Methylcellulose, Laxative, (CITRUCEL PO) Take by mouth daily      atorvastatin (LIPITOR) 40 MG tablet Take 1 tablet by mouth daily 90 tablet 3    umeclidinium-vilanterol (ANORO ELLIPTA) 62.5-25 MCG/INH AEPB inhaler Inhale 1 puff into the lungs daily 30 puff 3    clopidogrel (PLAVIX) 75 MG tablet Take 1 tablet by mouth daily 90 tablet 4    lisinopril (PRINIVIL;ZESTRIL) 20 MG tablet Take 1 tablet by mouth daily 90 tablet 3    aspirin 81 MG EC tablet Take 81 mg by mouth daily      carvedilol (COREG) 12.5 MG tablet Take 1 tablet by mouth 2 times daily 180 tablet 3    albuterol sulfate  (90 Base) MCG/ACT inhaler Inhale 2 puffs into the lungs every 6 hours as needed for Wheezing 1 Inhaler 2    montelukast (SINGULAIR) 10 MG tablet Take 10 mg by mouth nightly. No current facility-administered medications for this visit. Review of Systems:  · Constitutional: no unanticipated weight loss. There's been no change in energy level, sleep pattern, or activity level.  No fevers, chills. · Eyes: No visual changes or diplopia. No scleral icterus. · ENT: No Headaches, hearing loss or vertigo. No mouth sores or sore throat. · Cardiovascular: as reviewed in HPI  · Respiratory: No cough or wheezing, no sputum production. No hematemesis. · Gastrointestinal: No abdominal pain, appetite loss, blood in stools. No change in bowel or bladder habits. · Genitourinary: No dysuria, trouble voiding, or hematuria. · Musculoskeletal:  No gait disturbance, no joint complaints. · Integumentary: No rash or pruritis. · Neurological: No headache, diplopia, change in muscle strength, numbness or tingling. · Psychiatric: No anxiety or depression. · Endocrine: No temperature intolerance. No excessive thirst, fluid intake, or urination. No tremor. · Hematologic/Lymphatic: No abnormal bruising or bleeding, blood clots or swollen lymph nodes. · Allergic/Immunologic: No nasal congestion or hives. Physical Exam:   Vitals:    12/21/20 1014   BP: 136/80   Pulse: 60   Temp: 97.6 °F (36.4 °C)   SpO2: 98%   Weight: 156 lb (70.8 kg)   Height: 5' 5\" (1.651 m)     Wt Readings from Last 3 Encounters:   12/21/20 156 lb (70.8 kg)   09/08/20 156 lb (70.8 kg)   08/26/20 155 lb (70.3 kg)     Constitutional: She is oriented to person, place, and time. She appears well-developed and well-nourished. In no acute distress. Head: Normocephalic and atraumatic. Pupils equal and round. Neck: Neck supple. No JVP. Bilateral + carotid bruit appreciated. No mass and no thyromegaly present. No lymphadenopathy present. Cardiovascular: Normal rate. Normal heart sounds patient has split second heart sound. Exam reveals no gallop and no friction rub. No murmur heard. Pulmonary/Chest: Effort normal and breath sounds normal. No respiratory distress. She has no wheezes, rhonchi or rales. Abdominal: Soft, non-tender. Bowel sounds are normal. She exhibits no organomegaly, mass or bruit.    Extremities: No edema, cyanosis, or consistent with ischemia in the territory of the proximal LAD .          Recommendation     Covid testing will be done for urgent cardiac cath which will be schedule     within next week           [facility]:6/9/20  Left Heart Cath  Dominance : Right   LM: normal   LAD: tandem 80% mildly calcified proximal stenoses; 50% mid plaque disease  LCx: luminal irregularities  RCA:  Proximal stent with 30-40% in stent restenosis; 50% mid lesion     6Fr XB 3.5 Guide  Runthrough wire to LCx  BMW wire to distal LAD     2.5 x 8 mm Trek predil  2.75 x 20 mm Trek predil      3.0 x 23 mm Xience KYM near ostial LAD     3.5 x 12 mm NC Trek ultimately to 14 SCOOTER at distal edge and 20 SCOOTER at proximal edge  20% proximal residual      Impression/Recommendations:  KYM-proximal LAD  ASA long term  Clopidogrel loaded in lab and for 12 months  Aggrastat for 6 hours with sheath removal thereafter  Continue home medications. Observe in CVU overnight.      Carotid Duplex:7/22/20  Right Impression    The right vertebral artery demonstrates abnormal to-fro flow. The right subclavian artery is visualized and demonstrates monophasic flow. possible stenosis of the brachiocephalic artery. Left Impression    The left internal carotid artery demonstrates no significant stenosis. The left vertebral artery demonstrates normal antegrade flow. The left subclavian artery is visualized and demonstrates triphasic flow. Carotid bulb appears to have homogenous plaque formation.           Echo:7/22/20  Summary  There is mild concentric left ventricular hypertrophy. Endocardium is not well visualized, EF may be reduced   50%, however  recommend repeat study with definity  Grade I diastolic dysfunction with normal LV filling pressures. Assessment/Plan:     CAD  Patient denies any chest pain shortness of breath orthopnea PND.   Her last cardiac cath revealed  KYM proximal LAD with residual 50% mid plaque disease LAD, proximal RCA stent with 30-40% in stent restenosis, 50% mid RCA lesion. Bo Thao, Plavix, B-blocker, and statin therapy. Discussed switching to Xarelto 2.5 mg p.o. twice daily but she would like to wait as it will be cost prohibited at this time. HTN, essential   Controlled. Continue current medical management. Hyperlipidemia, unspecified   Last lipid profile 12/2020. Continue high potency statin therapy Lipitor 40 mg daily. Right Brachiocephalic artery stenosis  Per duplex study 7/22/20. No treatment needed at this time. Bilateral Carotid Bruit  7/22/20 Bilateral Carotid Duplex with no carotid stenosis but revealed R brachiocephalic artery stenosis. she denies hx of TIA/CVA. Medical therapy DAPT, statin and ACEI. Bradycardia  Asymptomatic. Moderate COPD/Centrilobular Emphysema/former smoker   Last Full PFT 4/2019 moderate restrictive lung disease. Medical therapy includes Anora inhaler, singular, albuterol. Managed per Dr. Edelmira Morales and next follow up 8/25/20    Anemia   Stable. Follow up in 6 months. She had her flu vaccine this season. Thank you very much for allowing me to participate in the care of your patient. Please do not hesitate to contact me if you have any questions.     Complexity of medical decision making-high  Sincerely,  Danuta He MD      [facility], [address] [place] [address]  Ph: [phone]  Fax: [phone]      This note was scribed in the presence of Dr Thu Angulo, by Mandy Masters RN

## 2020-12-21 ENCOUNTER — OFFICE VISIT (OUTPATIENT)
Dept: CARDIOLOGY CLINIC | Age: 75
End: 2020-12-21
Payer: MEDICARE

## 2020-12-21 VITALS
BODY MASS INDEX: 25.99 KG/M2 | SYSTOLIC BLOOD PRESSURE: 136 MMHG | TEMPERATURE: 97.6 F | DIASTOLIC BLOOD PRESSURE: 80 MMHG | HEART RATE: 60 BPM | HEIGHT: 65 IN | OXYGEN SATURATION: 98 % | WEIGHT: 156 LBS

## 2020-12-21 PROCEDURE — 1090F PRES/ABSN URINE INCON ASSESS: CPT | Performed by: INTERNAL MEDICINE

## 2020-12-21 PROCEDURE — 1123F ACP DISCUSS/DSCN MKR DOCD: CPT | Performed by: INTERNAL MEDICINE

## 2020-12-21 PROCEDURE — G8417 CALC BMI ABV UP PARAM F/U: HCPCS | Performed by: INTERNAL MEDICINE

## 2020-12-21 PROCEDURE — G8427 DOCREV CUR MEDS BY ELIG CLIN: HCPCS | Performed by: INTERNAL MEDICINE

## 2020-12-21 PROCEDURE — 1036F TOBACCO NON-USER: CPT | Performed by: INTERNAL MEDICINE

## 2020-12-21 PROCEDURE — 99214 OFFICE O/P EST MOD 30 MIN: CPT | Performed by: INTERNAL MEDICINE

## 2020-12-21 PROCEDURE — 3017F COLORECTAL CA SCREEN DOC REV: CPT | Performed by: INTERNAL MEDICINE

## 2020-12-21 PROCEDURE — 4040F PNEUMOC VAC/ADMIN/RCVD: CPT | Performed by: INTERNAL MEDICINE

## 2020-12-21 PROCEDURE — G8484 FLU IMMUNIZE NO ADMIN: HCPCS | Performed by: INTERNAL MEDICINE

## 2020-12-21 PROCEDURE — G8400 PT W/DXA NO RESULTS DOC: HCPCS | Performed by: INTERNAL MEDICINE

## 2020-12-21 PROCEDURE — G9899 SCRN MAM PERF RSLTS DOC: HCPCS | Performed by: INTERNAL MEDICINE

## 2020-12-21 NOTE — PATIENT INSTRUCTIONS
Patient Education        Heart-Healthy Diet: Care Instructions  Your Care Instructions     A heart-healthy diet has lots of vegetables, fruits, nuts, beans, and whole grains, and is low in salt. It limits foods that are high in saturated fat, such as meats, cheeses, and fried foods. It may be hard to change your diet, but even small changes can lower your risk of heart attack and heart disease. Follow-up care is a key part of your treatment and safety. Be sure to make and go to all appointments, and call your doctor if you are having problems. It's also a good idea to know your test results and keep a list of the medicines you take. How can you care for yourself at home? Watch your portions  · Learn what a serving is. A \"serving\" and a \"portion\" are not always the same thing. Make sure that you are not eating larger portions than are recommended. For example, a serving of pasta is ½ cup. A serving size of meat is 2 to 3 ounces. A 3-ounce serving is about the size of a deck of cards. Measure serving sizes until you are good at [place]" them. Keep in mind that restaurants often serve portions that are 2 or 3 times the size of one serving. · To keep your energy level up and keep you from feeling hungry, eat often but in smaller portions. · Eat only the number of calories you need to stay at a healthy weight. If you need to lose weight, eat fewer calories than your body burns (through exercise and other physical activity). Eat more fruits and vegetables  · Eat a variety of fruit and vegetables every day. Dark green, deep orange, red, or yellow fruits and vegetables are especially good for you. Examples include spinach, carrots, peaches, and berries. · Keep carrots, celery, and other veggies handy for snacks. Buy fruit that is in season and store it where you can see it so that you will be tempted to eat it. · Cook dishes that have a lot of veggies in them, such as stir-fries and soups. Limit saturated and trans fat  · Read food labels, and try to avoid saturated and trans fats. They increase your risk of heart disease. · Use olive or canola oil when you cook. · Bake, broil, grill, or steam foods instead of frying them. · Choose lean meats instead of high-fat meats such as hot dogs and sausages. Cut off all visible fat when you prepare meat. · Eat fish, skinless poultry, and meat alternatives such as soy products instead of high-fat meats. Soy products, such as tofu, may be especially good for your heart. · Choose low-fat or fat-free milk and dairy products. Eat foods high in fiber  · Eat a variety of grain products every day. Include whole-grain foods that have lots of fiber and nutrients. Examples of whole-grain foods include oats, whole wheat bread, and brown rice. · Buy whole-grain breads and cereals, instead of white bread or pastries. Limit salt and sodium  · Limit how much salt and sodium you eat to help lower your blood pressure. · Taste food before you salt it. Add only a little salt when you think you need it. With time, your taste buds will adjust to less salt. · Eat fewer snack items, fast foods, and other high-salt, processed foods. Check food labels for the amount of sodium in packaged foods. · Choose low-sodium versions of canned goods (such as soups, vegetables, and beans). Limit sugar  · Limit drinks and foods with added sugar. These include candy, desserts, and soda pop. Limit alcohol  · Limit alcohol to no more than 2 drinks a day for men and 1 drink a day for women. Too much alcohol can cause health problems. When should you call for help? Watch closely for changes in your health, and be sure to contact your doctor if:    · You would like help planning heart-healthy meals. Where can you learn more? Go to [email or web address] and sign in to your [employer] account. Enter V137 in the Roomlr box to learn more about \"Heart-Healthy Diet: Care Instructions. \"     If you do not have an account, please click on the \"Sign Up Now\" link. Current as of: August 22, 2019               Content Version: 12.6  © 4788-0770 [employer]. Care instructions adapted under license by [facility] ([facility]). If you have questions about a medical condition or this instruction, always ask your healthcare professional. [address] [address] any warranty or liability for your use of this information.

## 2021-01-28 ENCOUNTER — OFFICE VISIT (OUTPATIENT)
Dept: PULMONOLOGY | Age: 76
End: 2021-01-28
Payer: MEDICARE

## 2021-01-28 VITALS
TEMPERATURE: 97.1 F | WEIGHT: 158 LBS | DIASTOLIC BLOOD PRESSURE: 77 MMHG | SYSTOLIC BLOOD PRESSURE: 145 MMHG | HEIGHT: 65 IN | OXYGEN SATURATION: 100 % | HEART RATE: 57 BPM | BODY MASS INDEX: 26.33 KG/M2 | RESPIRATION RATE: 16 BRPM

## 2021-01-28 DIAGNOSIS — J44.9 MODERATE COPD (CHRONIC OBSTRUCTIVE PULMONARY DISEASE) (HCC): Primary | ICD-10-CM

## 2021-01-28 DIAGNOSIS — Z72.0 TOBACCO ABUSE: ICD-10-CM

## 2021-01-28 PROCEDURE — G8484 FLU IMMUNIZE NO ADMIN: HCPCS | Performed by: INTERNAL MEDICINE

## 2021-01-28 PROCEDURE — 1036F TOBACCO NON-USER: CPT | Performed by: INTERNAL MEDICINE

## 2021-01-28 PROCEDURE — G8400 PT W/DXA NO RESULTS DOC: HCPCS | Performed by: INTERNAL MEDICINE

## 2021-01-28 PROCEDURE — 1123F ACP DISCUSS/DSCN MKR DOCD: CPT | Performed by: INTERNAL MEDICINE

## 2021-01-28 PROCEDURE — 99214 OFFICE O/P EST MOD 30 MIN: CPT | Performed by: INTERNAL MEDICINE

## 2021-01-28 PROCEDURE — G9899 SCRN MAM PERF RSLTS DOC: HCPCS | Performed by: INTERNAL MEDICINE

## 2021-01-28 PROCEDURE — 3023F SPIROM DOC REV: CPT | Performed by: INTERNAL MEDICINE

## 2021-01-28 PROCEDURE — 4040F PNEUMOC VAC/ADMIN/RCVD: CPT | Performed by: INTERNAL MEDICINE

## 2021-01-28 PROCEDURE — 3017F COLORECTAL CA SCREEN DOC REV: CPT | Performed by: INTERNAL MEDICINE

## 2021-01-28 PROCEDURE — G8926 SPIRO NO PERF OR DOC: HCPCS | Performed by: INTERNAL MEDICINE

## 2021-01-28 PROCEDURE — G8417 CALC BMI ABV UP PARAM F/U: HCPCS | Performed by: INTERNAL MEDICINE

## 2021-01-28 PROCEDURE — 1090F PRES/ABSN URINE INCON ASSESS: CPT | Performed by: INTERNAL MEDICINE

## 2021-01-28 PROCEDURE — G8427 DOCREV CUR MEDS BY ELIG CLIN: HCPCS | Performed by: INTERNAL MEDICINE

## 2021-01-28 ASSESSMENT — COPD QUESTIONNAIRES
QUESTION1_COUGHFREQUENCY: 2
QUESTION5_HOMEACTIVITIES: 0
QUESTION7_SLEEPQUALITY: 0
QUESTION4_WALKINCLINE: 3
CAT_TOTALSCORE: 9

## 2021-01-28 NOTE — ASSESSMENT & PLAN NOTE
-FEV1 50%, no exacerbations or hospitalizations in the past year.  -Continue Anoro daily, albuterol as needed  -Has quit smoking

## 2021-01-28 NOTE — PROGRESS NOTES
REASON FOR CONSULTATION:  Chief Complaint   Patient presents with    COPD     followup         Consult at request of Hayley Raya MD     PCP: Hayley Raya MD    HISTORY OF PRESENT ILLNESS: Mayra Galdamez is a 77y.o. year old female with a history of moderate COPD, FEV1 58%, here for f/u. Moderate COPD/Centrilobular emphysema - on Anoro Daily, she states that makes her breathing much better, using albuterol very frequently. Symptoms well controlled. No recent exacerbations, or hospitalization. Tobacco abuse former smoking. Quit ~25ya. No nodules on LDCT 4/2019 no longer eligible for lung cancer screening    Notes from Dr. Geraldo Ko reviewed as part of this visit  Past Medical History:   Diagnosis Date    Bruising     CAD (coronary artery disease)     Cardiomyopathy ([facility] [address]     Emphysema     Hyperlipidemia     Hypertension     Myocardial infarct [facility])        Past Surgical History:   Procedure Laterality Date    APPENDECTOMY      BREAST BIOPSY      CARDIAC SURGERY  2009    RCA    CHOLECYSTECTOMY      COLONOSCOPY      HYSTEROSCOPY  09/09/2016    HYSTEROSCOPY DILATATION AND CURETTAGE WITH MYOSURE    SUBCLAVIAN BYPASS GRAFT  1989    chest, 1989    [facility]         family history includes Cancer in her father; Emphysema in her mother; Heart Disease in her mother; Heart Failure in her mother; Liver Disease in her father. SOCIAL HISTORY:   reports that she quit smoking about 26 years ago. She has a 37.00 pack-year smoking history. She has never used smokeless tobacco.      ALLERGIES:  Patient is allergic to darvon [propoxyphene hcl]; minocin [minocycline hcl]; minocycline; propoxyphene; oseltamivir; and tamiflu [oseltamivir phosphate].     REVIEW OF SYSTEMS:  Constitutional: Negative for fever   HENT: Negative for sore throat  Eyes: Negative for redness   Respiratory: +dyspnea on exertion, -cough  Cardiovascular: Negative for chest pain Gastrointestinal: Negative for vomiting, diarrhea   Genitourinary: Negative for hematuria   Musculoskeletal: Negative for arthralgias   Skin: Negative for rash  Neurological: Negative for syncope  Hematological: Negative for adenopathy  Psychiatric/Behavorial: Negative for anxiety    Objective:   PHYSICAL EXAM:  Blood pressure (!) 145/77, pulse 57, temperature 97.1 °F (36.2 °C), temperature source Temporal, resp. rate 16, height 5' 5\" (1.651 m), weight 158 lb (71.7 kg), SpO2 100 %, not currently breastfeeding.'  Gen: No distress. Eyes: PERRL. No sclera icterus. No conjunctival injection. ENT: No discharge. Pharynx clear. External appearance of ears and nose normal.  Neck: Trachea midline. No obvious mass. Resp: No accessory muscle use. No crackles. No wheezes. No rhonchi. CV: Regular rate. Regular rhythm. No murmur or rub. No edema. GI: Non-tender. Non-distended. No hernia. Skin: Warm, dry, normal texture and turgor. No nodule on exposed extremities. Lymph: No cervical LAD. No supraclavicular LAD. M/S: No cyanosis. No clubbing. No joint deformity. Neuro: Moves all four extremities. Psych: Oriented x 3. No anxiety. Awake. Alert. Intact judgement and insight.     Current Outpatient Medications   Medication Sig Dispense Refill    Probiotic Product (PROBIOTIC PO) Take by mouth daily      Methylcellulose, Laxative, (CITRUCEL PO) Take by mouth daily      atorvastatin (LIPITOR) 40 MG tablet Take 1 tablet by mouth daily 90 tablet 3    umeclidinium-vilanterol (ANORO ELLIPTA) 62.5-25 MCG/INH AEPB inhaler Inhale 1 puff into the lungs daily 30 puff 3    clopidogrel (PLAVIX) 75 MG tablet Take 1 tablet by mouth daily 90 tablet 4    lisinopril (PRINIVIL;ZESTRIL) 20 MG tablet Take 1 tablet by mouth daily 90 tablet 3    aspirin 81 MG EC tablet Take 81 mg by mouth daily      carvedilol (COREG) 12.5 MG tablet Take 1 tablet by mouth 2 times daily 180 tablet 3  albuterol sulfate  (90 Base) MCG/ACT inhaler Inhale 2 puffs into the lungs every 6 hours as needed for Wheezing 1 Inhaler 2    montelukast (SINGULAIR) 10 MG tablet Take 10 mg by mouth nightly. No current facility-administered medications for this visit. Data Reviewed:   CBC and Renal reviewed  Last CBC  Lab Results   Component Value Date    WBC 5.7 07/22/2020    RBC 4.55 07/22/2020    HGB 14.2 07/22/2020    MCV 91.1 07/22/2020     07/22/2020     Last Renal  Lab Results   Component Value Date     12/18/2020    K 4.9 12/18/2020    K 3.9 05/16/2019     12/18/2020    CO2 29 12/18/2020    CO2 27 07/22/2020    CO2 24 06/10/2020    BUN 9 12/18/2020    CREATININE 0.5 12/18/2020    GLUCOSE 96 07/22/2020    CALCIUM 9.4 12/18/2020     Recent lipid profile reviewed    Last ABG  POC Blood Gas: No results found for: POCPH, POCPCO2, POCPO2, POCHCO3, NBEA, CDXP2JCM  No results for input(s): PH, PCO2, PO2, HCO3, BE, O2SAT in the last 72 hours. Radiology Review:  Pertinent images / reports were reviewed as a part of this visit. [facility] DANO DIGITAL DIAGNOSTIC BILATERAL  Narrative: EXAMINATION:  DIAGNOSTIC DIGITAL BILATERAL BREASTS MAMMOGRAM WITH TOMOSYNTHESIS, 9/11/2020  10:04 am    TECHNIQUE:  Diagnostic mammography of the bilateral breasts was performed with  tomosynthesis. 2D standard and 3D tomosynthesis combination imaging  performed through both breasts. Computer aided detection was utilized in the  interpretation of this exam.    Views: 4    COMPARISON:  09/17/2019    HISTORY:  ORDERING SYSTEM PROVIDED HISTORY: Abnormal mammogram    FINDINGS:  The parenchymal pattern is stable in appearance with scattered fibroglandular  elements. There are no suspicious masses or focal pleomorphic  microcalcifications evident to reflect malignancy. Impression: ACR BI-RADS category 2-benign    ACR breast density B -There are scattered areas of fibroglandular tissue. Recommendation: Routine screening mammography in 1 year. Pulmonary function testing  Moderate Obstruction with FEV1 58%    Assessment/Plan:       Diagnosis Orders   1. Moderate COPD (chronic obstructive pulmonary disease) ([facility] [address]     2. Tobacco abuse           Problem List Items Addressed This Visit        Pulmonary Problems    Moderate COPD (chronic obstructive pulmonary disease) (HCC) - Primary     -FEV1 50%, no exacerbations or hospitalizations in the past year.  -Continue Anoro daily, albuterol as needed  -Has quit smoking            Other    Tobacco abuse     -Quit greater than 25 years ago           Return to clinic in 1 year     This note was transcribed using [id number] [employer]esty. Please disregard any translational errors.     Shamika Cordero [address] [place] [facility]

## 2021-02-12 ENCOUNTER — HOSPITAL ENCOUNTER (OUTPATIENT)
Dept: GENERAL RADIOLOGY | Age: 76
Discharge: HOME | End: 2021-02-12
Payer: MEDICARE

## 2021-02-12 ENCOUNTER — HOSPITAL ENCOUNTER (OUTPATIENT)
Age: 76
Discharge: HOME | End: 2021-02-12
Payer: MEDICARE

## 2021-02-12 DIAGNOSIS — M25.551 RIGHT HIP PAIN: ICD-10-CM

## 2021-02-12 LAB
A/G RATIO: 1.7 (ref 1.1–2.2)
ALBUMIN SERPL-MCNC: 4.2 G/DL (ref 3.4–5)
ALP BLD-CCNC: 90 U/L (ref 40–129)
ALT SERPL-CCNC: 12 U/L (ref 10–40)
ANION GAP SERPL CALCULATED.3IONS-SCNC: 8 MMOL/L (ref 3–16)
AST SERPL-CCNC: 17 U/L (ref 15–37)
BILIRUB SERPL-MCNC: 0.6 MG/DL (ref 0–1)
BUN BLDV-MCNC: 11 MG/DL (ref 7–20)
CALCIUM SERPL-MCNC: 9.4 MG/DL (ref 8.3–10.6)
CHLORIDE BLD-SCNC: 102 MMOL/L (ref 99–110)
CHOLESTEROL, TOTAL: 146 MG/DL (ref 0–199)
CO2: 27 MMOL/L (ref 21–32)
CREAT SERPL-MCNC: 0.6 MG/DL (ref 0.6–1.2)
CREATININE URINE: 121.1 MG/DL (ref 28–259)
ESTIMATED AVERAGE GLUCOSE: 114 MG/DL
GFR AFRICAN AMERICAN: >60
GFR NON-AFRICAN AMERICAN: >60
GLOBULIN: 2.5 G/DL
GLUCOSE BLD-MCNC: 91 MG/DL (ref 70–99)
HBA1C MFR BLD: 5.6 %
HCT VFR BLD CALC: 41.5 % (ref 36–48)
HDLC SERPL-MCNC: 66 MG/DL (ref 40–60)
HEMOGLOBIN: 13.7 G/DL (ref 12–16)
LDL CHOLESTEROL CALCULATED: 65 MG/DL
MCH RBC QN AUTO: 30.3 PG (ref 26–34)
MCHC RBC AUTO-ENTMCNC: 33.1 G/DL (ref 31–36)
MCV RBC AUTO: 91.4 FL (ref 80–100)
MICROALBUMIN UR-MCNC: <1.2 MG/DL
MICROALBUMIN/CREAT UR-RTO: NORMAL MG/G (ref 0–30)
PDW BLD-RTO: 13.2 % (ref 12.4–15.4)
PLATELET # BLD: 230 K/UL (ref 135–450)
PMV BLD AUTO: 6.9 FL (ref 5–10.5)
POTASSIUM SERPL-SCNC: 4.6 MMOL/L (ref 3.5–5.1)
RBC # BLD: 4.54 M/UL (ref 4–5.2)
SODIUM BLD-SCNC: 137 MMOL/L (ref 136–145)
TOTAL PROTEIN: 6.7 G/DL (ref 6.4–8.2)
TRIGL SERPL-MCNC: 75 MG/DL (ref 0–150)
TSH SERPL DL<=0.05 MIU/L-ACNC: 2.22 UIU/ML (ref 0.27–4.2)
VITAMIN D 25-HYDROXY: 28.9 NG/ML
VLDLC SERPL CALC-MCNC: 15 MG/DL
WBC # BLD: 5.2 K/UL (ref 4–11)

## 2021-02-12 PROCEDURE — 80053 COMPREHEN METABOLIC PANEL: CPT

## 2021-02-12 PROCEDURE — 84443 ASSAY THYROID STIM HORMONE: CPT

## 2021-02-12 PROCEDURE — 83036 HEMOGLOBIN GLYCOSYLATED A1C: CPT

## 2021-02-12 PROCEDURE — 85027 COMPLETE CBC AUTOMATED: CPT

## 2021-02-12 PROCEDURE — 36415 COLL VENOUS BLD VENIPUNCTURE: CPT

## 2021-02-12 PROCEDURE — 82306 VITAMIN D 25 HYDROXY: CPT

## 2021-02-12 PROCEDURE — 82570 ASSAY OF URINE CREATININE: CPT

## 2021-02-12 PROCEDURE — 80061 LIPID PANEL: CPT

## 2021-02-12 PROCEDURE — 82043 UR ALBUMIN QUANTITATIVE: CPT

## 2021-02-12 PROCEDURE — 73502 X-RAY EXAM HIP UNI 2-3 VIEWS: CPT

## 2021-02-17 DIAGNOSIS — I10 ESSENTIAL HYPERTENSION: ICD-10-CM

## 2021-02-17 NOTE — TELEPHONE ENCOUNTER
Medication Refill    lisinopril (PRINIVIL;ZESTRIL) 20 MG tablet   Take 1 tablet by mouth daily    90 days    [facility] Jcarlos Carr

## 2021-02-18 RX ORDER — LISINOPRIL 20 MG/1
20 TABLET ORAL DAILY
Qty: 90 TABLET | Refills: 3 | Status: SHIPPED | OUTPATIENT
Start: 2021-02-18

## 2021-02-25 RX ORDER — UMECLIDINIUM BROMIDE AND VILANTEROL TRIFENATATE 62.5; 25 UG/1; UG/1
1 POWDER RESPIRATORY (INHALATION) DAILY
Qty: 1 EACH | Refills: 5 | Status: SHIPPED | OUTPATIENT
Start: 2021-02-25 | End: 2021-08-30

## 2021-06-24 ENCOUNTER — OFFICE VISIT (OUTPATIENT)
Dept: CARDIOLOGY CLINIC | Age: 76
End: 2021-06-24
Payer: MEDICARE

## 2021-06-24 VITALS
OXYGEN SATURATION: 98 % | WEIGHT: 161 LBS | HEIGHT: 65 IN | SYSTOLIC BLOOD PRESSURE: 132 MMHG | HEART RATE: 60 BPM | BODY MASS INDEX: 26.82 KG/M2 | DIASTOLIC BLOOD PRESSURE: 76 MMHG

## 2021-06-24 DIAGNOSIS — E78.5 HYPERLIPIDEMIA, UNSPECIFIED HYPERLIPIDEMIA TYPE: ICD-10-CM

## 2021-06-24 DIAGNOSIS — I10 ESSENTIAL HYPERTENSION: ICD-10-CM

## 2021-06-24 DIAGNOSIS — I25.10 CORONARY ARTERY DISEASE INVOLVING NATIVE CORONARY ARTERY OF NATIVE HEART WITHOUT ANGINA PECTORIS: Primary | ICD-10-CM

## 2021-06-24 PROCEDURE — 1036F TOBACCO NON-USER: CPT | Performed by: INTERNAL MEDICINE

## 2021-06-24 PROCEDURE — 99214 OFFICE O/P EST MOD 30 MIN: CPT | Performed by: INTERNAL MEDICINE

## 2021-06-24 PROCEDURE — G8417 CALC BMI ABV UP PARAM F/U: HCPCS | Performed by: INTERNAL MEDICINE

## 2021-06-24 PROCEDURE — G8427 DOCREV CUR MEDS BY ELIG CLIN: HCPCS | Performed by: INTERNAL MEDICINE

## 2021-06-24 PROCEDURE — 1090F PRES/ABSN URINE INCON ASSESS: CPT | Performed by: INTERNAL MEDICINE

## 2021-06-24 PROCEDURE — 1123F ACP DISCUSS/DSCN MKR DOCD: CPT | Performed by: INTERNAL MEDICINE

## 2021-06-24 PROCEDURE — G8400 PT W/DXA NO RESULTS DOC: HCPCS | Performed by: INTERNAL MEDICINE

## 2021-06-24 PROCEDURE — G9899 SCRN MAM PERF RSLTS DOC: HCPCS | Performed by: INTERNAL MEDICINE

## 2021-06-24 PROCEDURE — 3017F COLORECTAL CA SCREEN DOC REV: CPT | Performed by: INTERNAL MEDICINE

## 2021-06-24 PROCEDURE — 4040F PNEUMOC VAC/ADMIN/RCVD: CPT | Performed by: INTERNAL MEDICINE

## 2021-06-24 NOTE — PROGRESS NOTES
[facility]  Cardiology Progress Note     Marlene Mak  [birth year] June 24, 2021    CC: \"I am doing great. \"     HPI:  The patient is 77 y.o. female with a past medical history significant for COPD/emphysema, CAD, CMP, HTN, HLD presents per Dr. Jennifer Shea as a second opinion for ongoing off/on SOB and dizziness. Primary Cardiologist Dr. Shazia Coburn and seen in the [facility] 6/1/20 for left sided chest pain x2 weeks, most notiable in bed with some exertional component. As reported, she has chronic mild CONNOR and was unchanged at that time. She had also reported increase in life stressors with a recent move. Stress study was completed on 6/2/20 which was abnormal. She was set up for a [facility] 6/9/20 revealing 80% proximal LAD stenosis s/p Xience KYM near ostial LAD with residual disease per Dr Isacc Bass. She presents today per Dr. Jennifer Shea for further assessment of SOB and dizziness that occurs off/on. Today, she is here for follow up and states she is feeling great with no cardiac sounding complaints. Patient denies exertional chest pain/pressure, dyspnea at rest, CONNOR, PND, orthopnea, palpitations, lightheadedness, weight changes, changes in LE edema, and syncope. The patient reports she stays active and is able to clean and do yard work. She reports she continues to follow up with Dr. Irlanda Dong and is doing well and now yearly follow up. She reports medical therapy compliance and toelerating.        Past Medical History:   Diagnosis Date    Bruising     CAD (coronary artery disease)     Cardiomyopathy ([address]     Emphysema     Hyperlipidemia     Hypertension     [facility])      Past Surgical History:   Procedure Laterality Date    APPENDECTOMY      BREAST BIOPSY      CARDIAC SURGERY  2009    RCA    CHOLECYSTECTOMY      COLONOSCOPY      HYSTEROSCOPY  09/09/2016    HYSTEROSCOPY DILATATION AND CURETTAGE WITH MYOSURE    SUBCLAVIAN BYPASS GRAFT  1989    chest, 1989  TUBAL LIGATION       Family History   Problem Relation Age of Onset    Heart Failure Mother     Emphysema Mother     Heart Disease Mother     Liver Disease Father     Cancer Father      Social History     Tobacco Use    Smoking status: Former Smoker     Packs/day: 1.00     Years: 37.00     Pack years: 37.00     Quit date: 1994     Years since quittin.8    Smokeless tobacco: Never Used    Tobacco comment: started at age 16 / smoked up to 1 p.p.d / smoked for 37 yrs   Vaping Use    Vaping Use: Never used   Substance Use Topics    Alcohol use: Yes     Alcohol/week: 2.0 standard drinks     Types: 2 Glasses of wine per week     Comment: OCCAS    Drug use: No       Allergies   Allergen Reactions    Darvon [Propoxyphene Hcl]      \"OUT OF BODY EXPERIENCE\"    Minocin [Minocycline Hcl]      BLADDER INFECTION    Minocycline     Propoxyphene     Oseltamivir Nausea And Vomiting    Tamiflu [Oseltamivir Phosphate] Nausea And Vomiting     Current Outpatient Medications   Medication Sig Dispense Refill    Cholecalciferol (VITAMIN D3 PO) Take 2,000 mg by mouth daily      umeclidinium-vilanterol (ANORO ELLIPTA) 62.5-25 MCG/INH AEPB inhaler Inhale 1 puff into the lungs daily 1 each 5    lisinopril (PRINIVIL;ZESTRIL) 20 MG tablet Take 1 tablet by mouth daily 90 tablet 3    Probiotic Product (PROBIOTIC PO) Take by mouth daily      atorvastatin (LIPITOR) 40 MG tablet Take 1 tablet by mouth daily 90 tablet 3    clopidogrel (PLAVIX) 75 MG tablet Take 1 tablet by mouth daily 90 tablet 4    aspirin 81 MG EC tablet Take 81 mg by mouth daily      carvedilol (COREG) 12.5 MG tablet Take 1 tablet by mouth 2 times daily 180 tablet 3    albuterol sulfate  (90 Base) MCG/ACT inhaler Inhale 2 puffs into the lungs every 6 hours as needed for Wheezing 1 Inhaler 2    montelukast (SINGULAIR) 10 MG tablet Take 10 mg by mouth nightly. No current facility-administered medications for this visit. non-tender. Bowel sounds are normal. She exhibits no organomegaly, mass or bruit. Extremities: No edema, cyanosis, or clubbing. Pulses are 2+ radial/dorsalis pedis/posterior tibial/carotid bilaterally. Neurological: No gross cranial nerve deficit. Coordination normal.   Skin: Skin is warm and dry. There is no rash or diaphoresis. Psychiatric: She has a normal mood and affect. Her speech is normal and behavior is normal.     Lab Review:     Lab Results   Component Value Date    TRIG 75 02/12/2021    HDL 66 02/12/2021    HDL 56 04/23/2012    LDLCALC 65 02/12/2021    LABVLDL 15 02/12/2021      Lab Results   Component Value Date     02/12/2021     Lab Results   Component Value Date    HGB 13.7 02/12/2021    HCT 41.5 02/12/2021     Lab Results   Component Value Date     02/12/2021     Lab Results   Component Value Date    K 4.6 02/12/2021    K 3.9 05/16/2019       Lab Results   Component Value Date    BUN 11 02/12/2021    CREATININE 0.6 02/12/2021       EKG Interpretation:   7/22/20 Sinus  Bradycardia with Left bundle branch block. ~55 bpm  8/26/20 Sinus  Bradycardia with Left bundle branch block. 6/24/21: not completed    Image Review:    Echo: 1/25/17  Normal left ventricular size and wall thickness. Low limits of normal left ventricular systolic function with abnormal septal motion related to IVCD. Estimated EF 50%. E/e'=21. Trivial mitral regurgitation. 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 Mild tricuspid regurgitation with RVSP estimated at 27 mmHg. Full PFT's:04/05/2019   INTERPRETATION:  Spirometry reveals decreased FVC at 2.1 liters, which  is 69% predicted. FEV1 is decreased at 1.29 liters, which is 56%  predicted. FEV1/FVC ratio is reduced at 61%. Lung volumes reveal  normal total lung capacity, vital capacity and residual volume. Diffusion capacity is reduced at 55% predicted. IMPRESSION:  Moderate restrictive lung disease.     Stress study:6/2/20  Summary     Mild anterior hypokinesis with EF 50%     Small-medium sized anterior completely reversible defect of moderate     intensity consistent with ischemia in the territory of the proximal LAD .          Recommendation     Covid testing will be done for urgent cardiac cath which will be schedule     within next week           LHC:6/9/20  Left Heart Cath  Dominance : Right   LM: normal   LAD: tandem 80% mildly calcified proximal stenoses; 50% mid plaque disease  LCx: luminal irregularities  RCA:  Proximal stent with 30-40% in stent restenosis; 50% mid lesion     6Fr XB 3.5 Guide  Runthrough wire to LCx  BMW wire to distal LAD     2.5 x 8 mm Trek predil  2.75 x 20 mm Trek predil      3.0 x 23 mm Xience KYM near ostial LAD     3.5 x 12 mm NC Trek ultimately to 14 SCOOTER at distal edge and 20 SCOOTER at proximal edge  20% proximal residual      Impression/Recommendations:  KYM-proximal LAD  ASA long term  Clopidogrel loaded in lab and for 12 months  Aggrastat for 6 hours with sheath removal thereafter  Continue home medications. Observe in CVU overnight.      Carotid Duplex:7/22/20  Right Impression    The right vertebral artery demonstrates abnormal to-fro flow. The right subclavian artery is visualized and demonstrates monophasic flow. possible stenosis of the brachiocephalic artery. Left Impression    The left internal carotid artery demonstrates no significant stenosis. The left vertebral artery demonstrates normal antegrade flow. The left subclavian artery is visualized and demonstrates triphasic flow. Carotid bulb appears to have homogenous plaque formation.           Echo:7/22/20  Summary  There is mild concentric left ventricular hypertrophy. Endocardium is not well visualized, EF may be reduced   50%, however  recommend repeat study with definity  Grade I diastolic dysfunction with normal LV filling pressures.        Assessment/Plan:     CAD   Her last cardiac cath 6/2020

## 2021-06-24 NOTE — PATIENT INSTRUCTIONS
Patient Education        Heart-Healthy Diet: Care Instructions  Your Care Instructions     A heart-healthy diet has lots of vegetables, fruits, nuts, beans, and whole grains, and is low in salt. It limits foods that are high in saturated fat, such as meats, cheeses, and fried foods. It may be hard to change your diet, but even small changes can lower your risk of heart attack and heart disease. Follow-up care is a key part of your treatment and safety. Be sure to make and go to all appointments, and call your doctor if you are having problems. It's also a good idea to know your test results and keep a list of the medicines you take. How can you care for yourself at home? Watch your portions  · Use food labels to learn what the recommended servings are for the foods you eat. · Eat only the number of calories you need to stay at a healthy weight. If you need to lose weight, eat fewer calories than your body burns (through exercise and other physical activity). Eat more fruits and vegetables  · Eat a variety of fruit and vegetables every day. Dark green, deep orange, red, or yellow fruits and vegetables are especially good for you. Examples include spinach, carrots, peaches, and berries. · Keep carrots, celery, and other veggies handy for snacks. Buy fruit that is in season and store it where you can see it so that you will be tempted to eat it. · Cook dishes that have a lot of veggies in them, such as stir-fries and soups. Limit saturated fat  · Read food labels, and try to avoid saturated fats. They increase your risk of heart disease. · Use olive or canola oil when you cook. · Bake, broil, grill, or steam foods instead of frying them. · Choose lean meats instead of high-fat meats such as hot dogs and sausages. Cut off all visible fat when you prepare meat. · Eat fish, skinless poultry, and meat alternatives such as soy products instead of high-fat meats.  Soy products, such as tofu, may be especially good for your heart. · Choose low-fat or fat-free milk and dairy products. Eat foods high in fiber  · Eat a variety of grain products every day. Include whole-grain foods that have lots of fiber and nutrients. Examples of whole-grain foods include oats, whole wheat bread, and brown rice. · Buy whole-grain breads and cereals, instead of white bread or pastries. Limit salt and sodium  · Limit how much salt and sodium you eat to help lower your blood pressure. · Taste food before you salt it. Add only a little salt when you think you need it. With time, your taste buds will adjust to less salt. · Eat fewer snack items, fast foods, and other high-salt, processed foods. Check food labels for the amount of sodium in packaged foods. · Choose low-sodium versions of canned goods (such as soups, vegetables, and beans). Limit sugar  · Limit drinks and foods with added sugar. These include candy, desserts, and soda pop. Limit alcohol  · Limit alcohol to no more than 2 drinks a day for men and 1 drink a day for women. Too much alcohol can cause health problems. When should you call for help? Watch closely for changes in your health, and be sure to contact your doctor if:    · You would like help planning heart-healthy meals. Where can you learn more? Go to https://Placecast.Identiv. org and sign in to your [employer] account. Enter V137 in the [facility] box to learn more about \"Heart-Healthy Diet: Care Instructions. \"     If you do not have an account, please click on the \"Sign Up Now\" link. Current as of: December 17, 2020               Content Version: 12.9  © 3623-8587 [employer]. Care instructions adapted under license by [facility]. If you have questions about a medical condition or this instruction, always ask your healthcare professional. [place] [address] any warranty or liability for your use of this information.          Patient Education Learning About Low-Sodium Foods  What foods are low in sodium? The foods you eat contain nutrients, such as vitamins and minerals. Sodium is a nutrient. Your body needs the right amount to stay healthy and work as it should. You can use the list below to help you make choices about which foods to eat. Fruits  · Fresh, frozen, canned, or dried fruit  Vegetables  · Fresh or frozen vegetables, with no added salt  · Canned vegetables, low-sodium or with no added salt  Grains  · Bagels without salted tops  · Cereal with no added salt  · Corn tortillas  · Crackers with no added salt  · Oatmeal, cooked without salt  · Popcorn with no salt  · Pasta and noodles, cooked without salt  · Rice, cooked without salt  · Unsalted pretzels  Dairy and dairy alternatives  · Butter, unsalted  · Cream cheese  · Ice cream  · Milk  · Soy milk  Meats and other protein foods  · Beans and peas, canned with no salt  · Eggs  · Fresh fish (not smoked)  · Fresh meats (not smoked or cured)  · Nuts and nut butter, prepared without salt  · Poultry, not packaged with sodium solution  · Tofu, unseasoned  · Tuna, canned without salt  Seasonings  · Garlic  · Herbs and spices  · Lemon juice  · Mustard  · Olive oil  · Salt-free seasoning mixes  · Vinegar  Work with your doctor to find out how much of this nutrient you need. Depending on your health, you may need more or less of it in your diet. Where can you learn more? Go to https://Placemeterpepiceweb.healthPigmata Media. org and sign in to your [employer] account. Enter I392 in the [facility] box to learn more about \"Learning About Low-Sodium Foods. \"     If you do not have an account, please click on the \"Sign Up Now\" link. Current as of: December 17, 2020               Content Version: 12.9  © 1201-5528 [employer]. Care instructions adapted under license by [facility] ([facility]).  If you have questions about a medical condition or this instruction, always ask your healthcare professional. condition or this instruction, always ask your healthcare professional. [place] [address] any warranty or liability for your use of this information.

## 2021-08-30 RX ORDER — UMECLIDINIUM BROMIDE AND VILANTEROL TRIFENATATE 62.5; 25 UG/1; UG/1
POWDER RESPIRATORY (INHALATION)
Qty: 1 EACH | Refills: 0 | Status: SHIPPED | OUTPATIENT
Start: 2021-08-30 | End: 2021-10-04 | Stop reason: SDUPTHER

## 2021-10-04 RX ORDER — UMECLIDINIUM BROMIDE AND VILANTEROL TRIFENATATE 62.5; 25 UG/1; UG/1
POWDER RESPIRATORY (INHALATION)
Qty: 1 EACH | Refills: 0 | Status: SHIPPED | OUTPATIENT
Start: 2021-10-04 | End: 2021-10-04 | Stop reason: SDUPTHER

## 2021-10-04 RX ORDER — UMECLIDINIUM BROMIDE AND VILANTEROL TRIFENATATE 62.5; 25 UG/1; UG/1
POWDER RESPIRATORY (INHALATION)
Qty: 1 EACH | Refills: 5 | Status: SHIPPED | OUTPATIENT
Start: 2021-10-04 | End: 2022-05-02

## 2021-10-18 NOTE — TELEPHONE ENCOUNTER
Medication Refill    Medication needing refilled:  clopidogrel (PLAVIX)     Dosage of the medication:  75 MG tablet    How are you taking this medication (QD, BID, TID, QID, PRN):  Take 1 tablet by mouth daily    30 or 90 day supply called in:    When will you run out of your medication:    Which Pharmacy are we sending the medication to?:      [facility], [address] [facility] [address] [phone] [address] [facility], [address]   Phone:  [phone]  Fax:  [phone]

## 2021-10-19 RX ORDER — CLOPIDOGREL BISULFATE 75 MG/1
TABLET ORAL
Qty: 90 TABLET | Refills: 4 | Status: SHIPPED | OUTPATIENT
Start: 2021-10-19

## 2021-12-14 DIAGNOSIS — E78.2 MIXED HYPERLIPIDEMIA: Chronic | ICD-10-CM

## 2021-12-14 DIAGNOSIS — I25.10 ATHEROSCLEROSIS OF NATIVE CORONARY ARTERY OF NATIVE HEART WITHOUT ANGINA PECTORIS: Chronic | ICD-10-CM

## 2021-12-14 NOTE — TELEPHONE ENCOUNTER
Received refill request for Atorvastatin from [address] [place] [address].     Last ov:06/01/2020 MMK    Last labs:02/12/2021 Lipid, 12/18/2020 CMP    Last Refill:12/16/2020 #90 tabs w/ 3 refills    Next appointment:none

## 2021-12-15 DIAGNOSIS — I25.10 ATHEROSCLEROSIS OF NATIVE CORONARY ARTERY OF NATIVE HEART WITHOUT ANGINA PECTORIS: Chronic | ICD-10-CM

## 2021-12-15 DIAGNOSIS — E78.2 MIXED HYPERLIPIDEMIA: Chronic | ICD-10-CM

## 2021-12-15 RX ORDER — ATORVASTATIN CALCIUM 40 MG/1
40 TABLET, FILM COATED ORAL DAILY
Qty: 90 TABLET | Refills: 3 | OUTPATIENT
Start: 2021-12-15

## 2021-12-15 RX ORDER — ATORVASTATIN CALCIUM 40 MG/1
40 TABLET, FILM COATED ORAL DAILY
Qty: 90 TABLET | Refills: 3 | Status: SHIPPED | OUTPATIENT
Start: 2021-12-15

## 2022-01-07 NOTE — PROGRESS NOTES
[facility]      Cardiology Progress Note     Raj Fowler  1945    January 10, 2022    CC: \"I feel great\"     HPI:  The patient is 77 y.o. female with a past medical history significant for COPD/emphysema, CAD, CMP, HTN, HLD presents per Dr. Wayne Agudelo as a second opinion for ongoing off/on SOB and dizziness. Primary Cardiologist Dr. Angeles Nicely and seen in the [facility] 6/1/20 for left sided chest pain x2 weeks, most notiable in bed with some exertional component. As reported, she has chronic mild CONNOR and was unchanged at that time. She had also reported increase in life stressors with a recent move. Stress study was completed on 6/2/20 which was abnormal. She was set up for a [facility] 6/9/20 revealing 80% proximal LAD stenosis s/p Xience KYM near ostial LAD with residual disease per Dr Dustin Vinson. She presents today per Dr. Wayne Agudelo for further assessment of SOB and dizziness that occurs off/on. She presents today for follow up. She reports feeling well overall and denies any cardiac symptoms. She denies chest pain with rest or exertion. Her breathing has been comfortable without shortness of breath.      Past Medical History:   Diagnosis Date    Bruising     CAD (coronary artery disease)     Cardiomyopathy ([address]     Emphysema     Hyperlipidemia     Hypertension     Myocardial infarct [facility])      Past Surgical History:   Procedure Laterality Date    APPENDECTOMY      BREAST BIOPSY      CARDIAC SURGERY  2009    RCA    CHOLECYSTECTOMY      COLONOSCOPY      HYSTEROSCOPY  09/09/2016    HYSTEROSCOPY DILATATION AND CURETTAGE WITH MYOSURE    SUBCLAVIAN BYPASS GRAFT  1989    chest, 1989    TUBAL LIGATION       Family History   Problem Relation Age of Onset    Heart Failure Mother     Emphysema Mother     Heart Disease Mother     Liver Disease Father     Cancer Father      Social History     Tobacco Use    Smoking status: Former Smoker     Packs/day: 1.00     Years: 37.00 Pack years: 37.00     Quit date: 1994     Years since quittin.3    Smokeless tobacco: Never Used    Tobacco comment: started at age 16 / smoked up to 1 p.p.d / smoked for 37 yrs   Vaping Use    Vaping Use: Never used   Substance Use Topics    Alcohol use: Yes     Alcohol/week: 2.0 standard drinks     Types: 2 Glasses of wine per week     Comment: OCCAS    Drug use: No       Allergies   Allergen Reactions    Darvon [Propoxyphene Hcl]      \"OUT OF BODY EXPERIENCE\"    Minocin [Minocycline Hcl]      BLADDER INFECTION    Minocycline     Propoxyphene     Oseltamivir Nausea And Vomiting    Tamiflu [Oseltamivir Phosphate] Nausea And Vomiting     Current Outpatient Medications   Medication Sig Dispense Refill    atorvastatin (LIPITOR) 40 MG tablet Take 1 tablet by mouth daily 90 tablet 3    clopidogrel (PLAVIX) 75 MG tablet TAKE ONE TABLET BY MOUTH DAILY 90 tablet 4    umeclidinium-vilanterol (ANORO ELLIPTA) 62.5-25 MCG/INH AEPB inhaler INHALE ONE DOSE BY MOUTH DAILY 1 each 5    Cholecalciferol (VITAMIN D3 PO) Take 2,000 mg by mouth daily      lisinopril (PRINIVIL;ZESTRIL) 20 MG tablet Take 1 tablet by mouth daily 90 tablet 3    Probiotic Product (PROBIOTIC PO) Take by mouth daily      aspirin 81 MG EC tablet Take 81 mg by mouth daily      carvedilol (COREG) 12.5 MG tablet Take 1 tablet by mouth 2 times daily 180 tablet 3    albuterol sulfate  (90 Base) MCG/ACT inhaler Inhale 2 puffs into the lungs every 6 hours as needed for Wheezing 1 Inhaler 2    montelukast (SINGULAIR) 10 MG tablet Take 10 mg by mouth nightly. No current facility-administered medications for this visit. Review of Systems:  · Constitutional: no unanticipated weight loss. There's been no change in energy level, sleep pattern, or activity level. No fevers, chills. · Eyes: No visual changes or diplopia. No scleral icterus. · ENT: No Headaches, hearing loss or vertigo.  No mouth sores or sore throat. · Cardiovascular: as reviewed in HPI  · Respiratory: No cough or wheezing, no sputum production. No hematemesis. · Gastrointestinal: No abdominal pain, appetite loss, blood in stools. No change in bowel or bladder habits. · Genitourinary: No dysuria, trouble voiding, or hematuria. · Musculoskeletal:  No gait disturbance, no joint complaints. · Integumentary: No rash or pruritis. · Neurological: No headache, diplopia, change in muscle strength, numbness or tingling. · Psychiatric: No anxiety or depression. · Endocrine: No temperature intolerance. No excessive thirst, fluid intake, or urination. No tremor. · Hematologic/Lymphatic: No abnormal bruising or bleeding, blood clots or swollen lymph nodes. · Allergic/Immunologic: No nasal congestion or hives. Physical Exam:   Vitals:    01/10/22 1128   BP: 132/74   SpO2: 99%   Weight: 163 lb (73.9 kg)   Height: 5' 5\" (1.651 m)     Wt Readings from Last 3 Encounters:   01/10/22 163 lb (73.9 kg)   06/24/21 161 lb (73 kg)   01/28/21 158 lb (71.7 kg)     Constitutional: She is oriented to person, place, and time. She appears well-developed and well-nourished. In no acute distress. Head: Normocephalic and atraumatic. Pupils equal and round. Neck: Neck supple. No JVP. Bilateral + carotid bruit appreciated. No mass and no thyromegaly present. No lymphadenopathy present. Cardiovascular: Normal rate. Normal heart sounds patient has split second heart sound. Exam reveals no gallop and no friction rub. No murmur heard. Pulmonary/Chest: Effort normal and breath sounds normal. No respiratory distress. She has no wheezes, rhonchi or rales. Abdominal: Soft, non-tender. Bowel sounds are normal. She exhibits no organomegaly, mass or bruit. Extremities: No edema, cyanosis, or clubbing. Pulses are 2+ radial/dorsalis pedis/posterior tibial/carotid bilaterally. Neurological: No gross cranial nerve deficit. Coordination normal.   Skin: Skin is warm and dry. There is no rash or diaphoresis. Psychiatric: She has a normal mood and affect. Her speech is normal and behavior is normal.     Lab Review:     Lab Results   Component Value Date    TRIG 75 02/12/2021    HDL 66 02/12/2021    HDL 56 04/23/2012    LDLCALC 65 02/12/2021    LABVLDL 15 02/12/2021      Lab Results   Component Value Date     02/12/2021     Lab Results   Component Value Date    HGB 13.7 02/12/2021    HCT 41.5 02/12/2021     Lab Results   Component Value Date     02/12/2021     Lab Results   Component Value Date    K 4.6 02/12/2021    K 3.9 05/16/2019       Lab Results   Component Value Date    BUN 11 02/12/2021    CREATININE 0.6 02/12/2021       EKG Interpretation:   7/22/20 Sinus Bradycardia with Left bundle branch block. ~55 bpm  8/26/20 Sinus Bradycardia with Left bundle branch block. 1/10/22 Sinus bradycardia with LBBB     Image Review:    Echo: 1/25/17  Normal left ventricular size and wall thickness. Low limits of normal left ventricular systolic function with abnormal septal motion related to IVCD. Estimated EF 50%. E/e'=21. Trivial mitral regurgitation. 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 Mild tricuspid regurgitation with RVSP estimated at 27 mmHg. Full PFT's:04/05/2019   INTERPRETATION:  Spirometry reveals decreased FVC at 2.1 liters, which  is 69% predicted. FEV1 is decreased at 1.29 liters, which is 56%  predicted. FEV1/FVC ratio is reduced at 61%. Lung volumes reveal  normal total lung capacity, vital capacity and residual volume. Diffusion capacity is reduced at 55% predicted. IMPRESSION:  Moderate restrictive lung disease.     Stress study:6/2/20  Summary     Mild anterior hypokinesis with EF 50%     Small-medium sized anterior completely reversible defect of moderate     intensity consistent with ischemia in the territory of the proximal LAD .          Recommendation     Covid testing will be done for urgent cardiac cath which current medical management. BMP stable 2/12/21. Hyperlipidemia, unspecified   She denies myalgias and has remained stable. Last lipid profile 2/12/21 with LDLc 65. Continue high potency statin therapy Lipitor 40 mg daily. Repeat fasting lipid profile. Right Brachiocephalic artery stenosis  Per duplex study 7/22/20. No treatment needed at this time. Bilateral Carotid Bruit  7/22/20 Bilateral Carotid Duplex with no carotid stenosis but revealed R brachiocephalic artery stenosis. She denies hx of TIA/CVA. Medical therapy DAPT, statin and ACEI. Bradycardia  Asymptomatic. Moderate COPD/Centrilobular Emphysema/former smoker   Last Full PFT 4/2019 moderate restrictive lung disease. Medical therapy includes Anora inhaler, singular, albuterol. Managed per Dr. Rudy Wadsworth with routine follow up. Anemia   Stable 2/12/2021 wnl. Follow up in 6-7 months. She has obtained her annual flu vaccine and encouraged to obtain COVID booster. Thank you very much for allowing me to participate in the care of your patient. Please do not hesitate to contact me if you have any questions. Sincerely,  Mery Daly MD      [facility], [address] [place] [address]  Ph: [phone]  Fax: [phone]      This note was scribed in the presence of Mery Daly MD by General Valdovinos, RN.

## 2022-01-10 ENCOUNTER — OFFICE VISIT (OUTPATIENT)
Dept: CARDIOLOGY CLINIC | Age: 77
End: 2022-01-10
Payer: MEDICARE

## 2022-01-10 VITALS
DIASTOLIC BLOOD PRESSURE: 74 MMHG | WEIGHT: 163 LBS | HEIGHT: 65 IN | SYSTOLIC BLOOD PRESSURE: 132 MMHG | OXYGEN SATURATION: 99 % | HEART RATE: 56 BPM | BODY MASS INDEX: 27.16 KG/M2

## 2022-01-10 DIAGNOSIS — I25.10 CORONARY ARTERY DISEASE INVOLVING NATIVE CORONARY ARTERY OF NATIVE HEART WITHOUT ANGINA PECTORIS: Primary | ICD-10-CM

## 2022-01-10 DIAGNOSIS — R09.89 BILATERAL CAROTID BRUITS: ICD-10-CM

## 2022-01-10 DIAGNOSIS — R00.1 BRADYCARDIA: ICD-10-CM

## 2022-01-10 DIAGNOSIS — E78.5 HYPERLIPIDEMIA LDL GOAL <70: ICD-10-CM

## 2022-01-10 DIAGNOSIS — I10 ESSENTIAL HYPERTENSION: ICD-10-CM

## 2022-01-10 PROCEDURE — G8427 DOCREV CUR MEDS BY ELIG CLIN: HCPCS | Performed by: INTERNAL MEDICINE

## 2022-01-10 PROCEDURE — G8484 FLU IMMUNIZE NO ADMIN: HCPCS | Performed by: INTERNAL MEDICINE

## 2022-01-10 PROCEDURE — 3017F COLORECTAL CA SCREEN DOC REV: CPT | Performed by: INTERNAL MEDICINE

## 2022-01-10 PROCEDURE — G8417 CALC BMI ABV UP PARAM F/U: HCPCS | Performed by: INTERNAL MEDICINE

## 2022-01-10 PROCEDURE — 1090F PRES/ABSN URINE INCON ASSESS: CPT | Performed by: INTERNAL MEDICINE

## 2022-01-10 PROCEDURE — 99214 OFFICE O/P EST MOD 30 MIN: CPT | Performed by: INTERNAL MEDICINE

## 2022-01-10 PROCEDURE — 93000 ELECTROCARDIOGRAM COMPLETE: CPT | Performed by: INTERNAL MEDICINE

## 2022-01-10 PROCEDURE — 1123F ACP DISCUSS/DSCN MKR DOCD: CPT | Performed by: INTERNAL MEDICINE

## 2022-01-10 PROCEDURE — 1036F TOBACCO NON-USER: CPT | Performed by: INTERNAL MEDICINE

## 2022-01-10 PROCEDURE — 4040F PNEUMOC VAC/ADMIN/RCVD: CPT | Performed by: INTERNAL MEDICINE

## 2022-01-10 PROCEDURE — G8400 PT W/DXA NO RESULTS DOC: HCPCS | Performed by: INTERNAL MEDICINE

## 2022-01-18 ENCOUNTER — OFFICE VISIT (OUTPATIENT)
Dept: PULMONOLOGY | Age: 77
End: 2022-01-18
Payer: MEDICARE

## 2022-01-18 VITALS
WEIGHT: 162.4 LBS | TEMPERATURE: 96.4 F | HEART RATE: 55 BPM | SYSTOLIC BLOOD PRESSURE: 120 MMHG | BODY MASS INDEX: 27.06 KG/M2 | HEIGHT: 65 IN | OXYGEN SATURATION: 100 % | RESPIRATION RATE: 12 BRPM | DIASTOLIC BLOOD PRESSURE: 60 MMHG

## 2022-01-18 DIAGNOSIS — Z72.0 TOBACCO ABUSE: ICD-10-CM

## 2022-01-18 DIAGNOSIS — J44.9 MODERATE COPD (CHRONIC OBSTRUCTIVE PULMONARY DISEASE) (HCC): Primary | ICD-10-CM

## 2022-01-18 PROCEDURE — 3017F COLORECTAL CA SCREEN DOC REV: CPT | Performed by: INTERNAL MEDICINE

## 2022-01-18 PROCEDURE — G8484 FLU IMMUNIZE NO ADMIN: HCPCS | Performed by: INTERNAL MEDICINE

## 2022-01-18 PROCEDURE — 3023F SPIROM DOC REV: CPT | Performed by: INTERNAL MEDICINE

## 2022-01-18 PROCEDURE — 4040F PNEUMOC VAC/ADMIN/RCVD: CPT | Performed by: INTERNAL MEDICINE

## 2022-01-18 PROCEDURE — 1036F TOBACCO NON-USER: CPT | Performed by: INTERNAL MEDICINE

## 2022-01-18 PROCEDURE — 99213 OFFICE O/P EST LOW 20 MIN: CPT | Performed by: INTERNAL MEDICINE

## 2022-01-18 PROCEDURE — G8427 DOCREV CUR MEDS BY ELIG CLIN: HCPCS | Performed by: INTERNAL MEDICINE

## 2022-01-18 PROCEDURE — 1123F ACP DISCUSS/DSCN MKR DOCD: CPT | Performed by: INTERNAL MEDICINE

## 2022-01-18 PROCEDURE — G8417 CALC BMI ABV UP PARAM F/U: HCPCS | Performed by: INTERNAL MEDICINE

## 2022-01-18 PROCEDURE — 1090F PRES/ABSN URINE INCON ASSESS: CPT | Performed by: INTERNAL MEDICINE

## 2022-01-18 PROCEDURE — G8400 PT W/DXA NO RESULTS DOC: HCPCS | Performed by: INTERNAL MEDICINE

## 2022-01-18 ASSESSMENT — ENCOUNTER SYMPTOMS: RESPIRATORY NEGATIVE: 1

## 2022-01-18 ASSESSMENT — COPD QUESTIONNAIRES
QUESTION8_ENERGYLEVEL: 1
CAT_TOTALSCORE: 3
QUESTION4_WALKINCLINE: 0
QUESTION1_COUGHFREQUENCY: 1
QUESTION6_LEAVINGHOUSE: 0
QUESTION3_CHESTTIGHTNESS: 0
QUESTION2_CHESTPHLEGM: 1
QUESTION5_HOMEACTIVITIES: 0
QUESTION7_SLEEPQUALITY: 0

## 2022-01-18 NOTE — ASSESSMENT & PLAN NOTE
-FEV1 50%, no exacerbations or hospitalizations recently  -Continue Anoro daily, albuterol as needed  -Has quit smoking

## 2022-01-18 NOTE — PROGRESS NOTES
[address], SLEEP, AND CRITICAL CARE   Kelechi Shah (:  1945) is a 77 y.o. female,Established patient, here for evaluation of the following chief complaint(s):  COPD and Follow-up         ASSESSMENT/PLAN:  1. Moderate COPD (chronic obstructive pulmonary disease) ([facility])  Assessment & Plan:  -FEV1 50%, no exacerbations or hospitalizations recently  -Continue Anoro daily, albuterol as needed  -Has quit smoking  2. Tobacco abuse      No follow-ups on file. Future Appointments   Date Time Provider Juan Miguel Leo   2023  1:00 PM Dawit Cunha MD [facility] PULM [facility]       Subjective   SUBJECTIVE/OBJECTIVE:  Since last visit no hospitalizations or exacerbations. Says Anoro has helped her breathing tremendously, on albuterol but uses infrequently      Review of Systems   Constitutional: Negative. Respiratory: Negative. Cardiovascular: Negative. Musculoskeletal: Negative. Neurological: Negative. Objective   Physical Exam    Gen:  No acute distress. Eyes: PERRL. EOMI. Anicteric sclera. No conjunctival injection. ENT: No discharge. oropharynx clear. External appearance of ears and nose normal.  Neck: Trachea midline. No mass   Resp:  No crackles. No wheezes. No rhonchi. No dullness on percussion. CV: Regular rate. Regular rhythm. No murmur or rub. No edema. GI: Soft, Non-tender. Non-distended. +BS  Skin: Warm, dry, w/o erythema. Lymph: No cervical or supraclavicular LAD. M/S: No cyanosis. No clubbing. Neuro:  no focal neurologic deficit. Moves all extremities  Psych: Awake and alert, Oriented x 3. Judgement and insight appropriate. Mood stable. On this date 2022 I have spent 21 minutes reviewing previous notes, test results and face to face with the patient discussing the diagnosis and importance of compliance with the treatment plan as well as documenting on the day of the visit.       An electronic signature was used to authenticate this note.     --Yary Meyer MD

## 2022-01-24 DIAGNOSIS — I10 ESSENTIAL HYPERTENSION: ICD-10-CM

## 2022-01-24 DIAGNOSIS — I25.10 CORONARY ARTERY DISEASE INVOLVING NATIVE CORONARY ARTERY OF NATIVE HEART WITHOUT ANGINA PECTORIS: ICD-10-CM

## 2022-01-24 DIAGNOSIS — E78.5 HYPERLIPIDEMIA LDL GOAL <70: ICD-10-CM

## 2022-01-24 LAB
A/G RATIO: 2 (ref 1.1–2.2)
ALBUMIN SERPL-MCNC: 4.3 G/DL (ref 3.4–5)
ALP BLD-CCNC: 86 U/L (ref 40–129)
ALT SERPL-CCNC: 13 U/L (ref 10–40)
ANION GAP SERPL CALCULATED.3IONS-SCNC: 9 MMOL/L (ref 3–16)
AST SERPL-CCNC: 16 U/L (ref 15–37)
BILIRUB SERPL-MCNC: 0.8 MG/DL (ref 0–1)
BUN BLDV-MCNC: 7 MG/DL (ref 7–20)
CALCIUM SERPL-MCNC: 9.5 MG/DL (ref 8.3–10.6)
CHLORIDE BLD-SCNC: 100 MMOL/L (ref 99–110)
CHOLESTEROL, FASTING: 136 MG/DL (ref 0–199)
CO2: 28 MMOL/L (ref 21–32)
CREAT SERPL-MCNC: 0.6 MG/DL (ref 0.6–1.2)
GFR AFRICAN AMERICAN: >60
GFR NON-AFRICAN AMERICAN: >60
GLUCOSE BLD-MCNC: 110 MG/DL (ref 70–99)
HDLC SERPL-MCNC: 54 MG/DL (ref 40–60)
LDL CHOLESTEROL CALCULATED: 64 MG/DL
POTASSIUM SERPL-SCNC: 5.3 MMOL/L (ref 3.5–5.1)
SODIUM BLD-SCNC: 137 MMOL/L (ref 136–145)
TOTAL PROTEIN: 6.5 G/DL (ref 6.4–8.2)
TRIGLYCERIDE, FASTING: 91 MG/DL (ref 0–150)
VLDLC SERPL CALC-MCNC: 18 MG/DL

## 2022-05-02 RX ORDER — UMECLIDINIUM BROMIDE AND VILANTEROL TRIFENATATE 62.5; 25 UG/1; UG/1
POWDER RESPIRATORY (INHALATION)
Qty: 60 EACH | Refills: 5 | Status: SHIPPED | OUTPATIENT
Start: 2022-05-02

## 2022-07-18 LAB
A/G RATIO: 2.3 (ref 1.1–2.2)
ALBUMIN SERPL-MCNC: 4.4 G/DL (ref 3.4–5)
ALP BLD-CCNC: 94 U/L (ref 40–129)
ALT SERPL-CCNC: 14 U/L (ref 10–40)
ANION GAP SERPL CALCULATED.3IONS-SCNC: 9 MMOL/L (ref 3–16)
AST SERPL-CCNC: 16 U/L (ref 15–37)
BILIRUB SERPL-MCNC: 0.8 MG/DL (ref 0–1)
BUN BLDV-MCNC: 12 MG/DL (ref 7–20)
CALCIUM SERPL-MCNC: 9.6 MG/DL (ref 8.3–10.6)
CHLORIDE BLD-SCNC: 101 MMOL/L (ref 99–110)
CHOLESTEROL, TOTAL: 143 MG/DL (ref 0–199)
CO2: 30 MMOL/L (ref 21–32)
CREAT SERPL-MCNC: 0.6 MG/DL (ref 0.6–1.2)
CREATININE URINE: 90.7 MG/DL (ref 28–259)
ESTIMATED AVERAGE GLUCOSE: 116.9 MG/DL
GFR AFRICAN AMERICAN: >60
GFR NON-AFRICAN AMERICAN: >60
GLUCOSE BLD-MCNC: 98 MG/DL (ref 70–99)
HBA1C MFR BLD: 5.7 %
HCT VFR BLD CALC: 38.8 % (ref 36–48)
HDLC SERPL-MCNC: 58 MG/DL (ref 40–60)
HEMOGLOBIN: 13.5 G/DL (ref 12–16)
LDL CHOLESTEROL CALCULATED: 68 MG/DL
MCH RBC QN AUTO: 31.4 PG (ref 26–34)
MCHC RBC AUTO-ENTMCNC: 34.8 G/DL (ref 31–36)
MCV RBC AUTO: 90 FL (ref 80–100)
MICROALBUMIN UR-MCNC: 3.6 MG/DL
MICROALBUMIN/CREAT UR-RTO: 39.7 MG/G (ref 0–30)
PDW BLD-RTO: 12.8 % (ref 12.4–15.4)
PLATELET # BLD: 227 K/UL (ref 135–450)
PMV BLD AUTO: 7.1 FL (ref 5–10.5)
POTASSIUM SERPL-SCNC: 5.3 MMOL/L (ref 3.5–5.1)
RBC # BLD: 4.31 M/UL (ref 4–5.2)
SODIUM BLD-SCNC: 140 MMOL/L (ref 136–145)
TOTAL PROTEIN: 6.3 G/DL (ref 6.4–8.2)
TRIGL SERPL-MCNC: 85 MG/DL (ref 0–150)
TSH SERPL DL<=0.05 MIU/L-ACNC: 3.41 UIU/ML (ref 0.27–4.2)
VITAMIN D 25-HYDROXY: 64.5 NG/ML
VLDLC SERPL CALC-MCNC: 17 MG/DL
WBC # BLD: 4.5 K/UL (ref 4–11)

## 2022-08-09 ENCOUNTER — OFFICE VISIT (OUTPATIENT)
Dept: CARDIOLOGY CLINIC | Age: 77
End: 2022-08-09
Payer: MEDICARE

## 2022-08-09 VITALS
HEIGHT: 65 IN | BODY MASS INDEX: 26.82 KG/M2 | SYSTOLIC BLOOD PRESSURE: 126 MMHG | DIASTOLIC BLOOD PRESSURE: 66 MMHG | OXYGEN SATURATION: 97 % | HEART RATE: 64 BPM | WEIGHT: 161 LBS

## 2022-08-09 DIAGNOSIS — I10 ESSENTIAL HYPERTENSION: ICD-10-CM

## 2022-08-09 DIAGNOSIS — E87.5 HYPERKALEMIA: ICD-10-CM

## 2022-08-09 DIAGNOSIS — R09.89 BILATERAL CAROTID BRUITS: ICD-10-CM

## 2022-08-09 DIAGNOSIS — I25.10 CORONARY ARTERY DISEASE INVOLVING NATIVE CORONARY ARTERY OF NATIVE HEART WITHOUT ANGINA PECTORIS: Primary | ICD-10-CM

## 2022-08-09 DIAGNOSIS — I77.1 BRACHIOCEPHALIC ARTERY STENOSIS, RIGHT (HCC): ICD-10-CM

## 2022-08-09 DIAGNOSIS — R00.1 BRADYCARDIA: ICD-10-CM

## 2022-08-09 DIAGNOSIS — E78.5 HYPERLIPIDEMIA LDL GOAL <70: ICD-10-CM

## 2022-08-09 PROCEDURE — 1036F TOBACCO NON-USER: CPT | Performed by: INTERNAL MEDICINE

## 2022-08-09 PROCEDURE — 1090F PRES/ABSN URINE INCON ASSESS: CPT | Performed by: INTERNAL MEDICINE

## 2022-08-09 PROCEDURE — 99214 OFFICE O/P EST MOD 30 MIN: CPT | Performed by: INTERNAL MEDICINE

## 2022-08-09 PROCEDURE — 1123F ACP DISCUSS/DSCN MKR DOCD: CPT | Performed by: INTERNAL MEDICINE

## 2022-08-09 PROCEDURE — G8417 CALC BMI ABV UP PARAM F/U: HCPCS | Performed by: INTERNAL MEDICINE

## 2022-08-09 PROCEDURE — G8427 DOCREV CUR MEDS BY ELIG CLIN: HCPCS | Performed by: INTERNAL MEDICINE

## 2022-08-09 PROCEDURE — G8400 PT W/DXA NO RESULTS DOC: HCPCS | Performed by: INTERNAL MEDICINE

## 2022-08-09 NOTE — PROGRESS NOTES
[address]      Cardiology Progress Note     Carmen Glynn  1945 August 9, 2022    CC: \"I have no heart symptoms. \"     HPI:  The patient is 69 y.o. female with a past medical history significant for COPD/emphysema, CAD, CMP, HTN, HLD presents per Dr. Claudia Sparrow as a second opinion for ongoing off/on SOB and dizziness. Primary Cardiologist Dr. Jonny Saxena and seen in the [facility] 6/1/20 for left sided chest pain x2 weeks, most notiable in bed with some exertional component. As reported, she has chronic mild CONNOR and was unchanged at that time. She had also reported increase in life stressors with a recent move. Stress study was completed on 6/2/20 which was abnormal. She was set up for a [facility] 6/9/20 revealing 80% proximal LAD stenosis s/p Xience KYM near ostial LAD with residual disease per Dr Kesha Arguello. She presents today per Dr. Claudia Sparrow for further assessment of SOB and dizziness that occurs off/on. She presents today for follow up. She currently reports that she is doing and feeling great. She is staying active and denies any physical limitations. She recently had 2 painting home projects. Patient denies exertional chest pain/pressure, dyspnea at rest, CONNOR, PND, orthopnea, palpitations, lightheadedness, weight changes, changes in LE edema, and syncope. Routine labs completed. She also admits to medical therapy compliance and tolerating. She admits to eating prunes with K elevated. She has reduced.        Past Medical History:   Diagnosis Date    Bruising     CAD (coronary artery disease)     Cardiomyopathy ([address]     Emphysema     Hyperlipidemia     Hypertension     Myocardial infarct [facility])      Past Surgical History:   Procedure Laterality Date    APPENDECTOMY      BREAST BIOPSY      CARDIAC SURGERY  2009    RCA    CHOLECYSTECTOMY      COLONOSCOPY      HYSTEROSCOPY  09/09/2016    HYSTEROSCOPY DILATATION AND CURETTAGE WITH MYOSURE    SUBCLAVIAN BYPASS GRAFT  1989    chest, 1989 TUBAL LIGATION       Family History   Problem Relation Age of Onset    Heart Failure Mother     Emphysema Mother     Heart Disease Mother     Liver Disease Father     Cancer Father      Social History     Tobacco Use    Smoking status: Former     Packs/day: 1.00     Years: 37.00     Pack years: 37.00     Types: Cigarettes     Quit date: 1994     Years since quittin.9    Smokeless tobacco: Never    Tobacco comments:     started at age 16 / smoked up to 1 p.p.d / smoked for 37 yrs   Vaping Use    Vaping Use: Never used   Substance Use Topics    Alcohol use: Yes     Alcohol/week: 2.0 standard drinks     Types: 2 Glasses of wine per week     Comment: OCCAS    Drug use: No       Allergies   Allergen Reactions    Darvon [Propoxyphene Hcl]      \"OUT OF BODY EXPERIENCE\"    Minocin [Minocycline Hcl]      BLADDER INFECTION    Minocycline     Propoxyphene     Oseltamivir Nausea And Vomiting    Tamiflu [Oseltamivir Phosphate] Nausea And Vomiting     Current Outpatient Medications   Medication Sig Dispense Refill    ANORO ELLIPTA 62.5-25 MCG/INH AEPB inhaler INHALE ONE DOSE BY MOUTH DAILY 60 each 5    atorvastatin (LIPITOR) 40 MG tablet Take 1 tablet by mouth daily 90 tablet 3    clopidogrel (PLAVIX) 75 MG tablet TAKE ONE TABLET BY MOUTH DAILY 90 tablet 4    Cholecalciferol (VITAMIN D3 PO) Take 2,000 mg by mouth daily      lisinopril (PRINIVIL;ZESTRIL) 20 MG tablet Take 1 tablet by mouth daily 90 tablet 3    Probiotic Product (PROBIOTIC PO) Take by mouth daily      aspirin 81 MG EC tablet Take 81 mg by mouth daily      carvedilol (COREG) 12.5 MG tablet Take 1 tablet by mouth 2 times daily 180 tablet 3    albuterol sulfate  (90 Base) MCG/ACT inhaler Inhale 2 puffs into the lungs every 6 hours as needed for Wheezing 1 Inhaler 2    montelukast (SINGULAIR) 10 MG tablet Take 10 mg by mouth nightly. No current facility-administered medications for this visit.        Review of Systems:  Constitutional: no unanticipated weight loss. There's been no change in energy level, sleep pattern, or activity level. No fevers, chills. Eyes: No visual changes or diplopia. No scleral icterus. ENT: No Headaches, hearing loss or vertigo. No mouth sores or sore throat. Cardiovascular: as reviewed in HPI  Respiratory: No cough or wheezing, no sputum production. No hematemesis. Gastrointestinal: No abdominal pain, appetite loss, blood in stools. No change in bowel or bladder habits. Genitourinary: No dysuria, trouble voiding, or hematuria. Musculoskeletal:  No gait disturbance, no joint complaints. Integumentary: No rash or pruritis. Neurological: No headache, diplopia, change in muscle strength, numbness or tingling. Psychiatric: No anxiety or depression. Endocrine: No temperature intolerance. No excessive thirst, fluid intake, or urination. No tremor. Hematologic/Lymphatic: No abnormal bruising or bleeding, blood clots or swollen lymph nodes. Allergic/Immunologic: No nasal congestion or hives. Physical Exam:   Vitals:    08/09/22 1333   BP: 126/66   Pulse: 64   SpO2: 97%   Weight: 161 lb (73 kg)   Height: 5' 5\" (1.651 m)       Wt Readings from Last 3 Encounters:   08/09/22 161 lb (73 kg)   01/18/22 162 lb 6.4 oz (73.7 kg)   01/10/22 163 lb (73.9 kg)     Constitutional: She is oriented to person, place, and time. She appears well-developed and well-nourished. In no acute distress. Head: Normocephalic and atraumatic. Pupils equal and round. Neck: Neck supple. No JVP. Bilateral + carotid bruit appreciated. No mass and no thyromegaly present. No lymphadenopathy present. Cardiovascular: Normal rate. Normal heart sounds patient has split second heart sound. Exam reveals no gallop and no friction rub. No murmur heard. Pulmonary/Chest: Effort normal and breath sounds normal. No respiratory distress. She has no wheezes, rhonchi or rales. Abdominal: Soft, non-tender.  Bowel sounds are normal. She exhibits no organomegaly, mass or bruit. Extremities: No edema, cyanosis, or clubbing. Pulses are 2+ radial/dorsalis pedis/posterior tibial/carotid bilaterally. Neurological: No gross cranial nerve deficit. Coordination normal.   Skin: Skin is warm and dry. There is no rash or diaphoresis. Psychiatric: She has a normal mood and affect. Her speech is normal and behavior is normal.     Lab Review:     Lab Results   Component Value Date/Time    TRIG 85 07/18/2022 08:11 AM    HDL 58 07/18/2022 08:11 AM    HDL 56 04/23/2012 07:46 AM    LDLCALC 68 07/18/2022 08:11 AM    LABVLDL 17 07/18/2022 08:11 AM      Lab Results   Component Value Date/Time     07/18/2022 08:11 AM     Lab Results   Component Value Date/Time    HGB 13.5 07/18/2022 08:11 AM    HCT 38.8 07/18/2022 08:11 AM     Lab Results   Component Value Date/Time     07/18/2022 08:11 AM     Lab Results   Component Value Date/Time    K 5.3 07/18/2022 08:11 AM    K 3.9 05/16/2019 06:23 AM       Lab Results   Component Value Date/Time    BUN 12 07/18/2022 08:11 AM    CREATININE 0.6 07/18/2022 08:11 AM       EKG Interpretation:   7/22/20: Sinus Bradycardia with Left bundle branch block. ~55 bpm  8/26/20: Sinus Bradycardia with Left bundle branch block. 1/10/22: Sinus bradycardia with LBBB   8/9/22: not completed     Image Review:    Echo: 1/25/17  Normal left ventricular size and wall thickness. Low limits of normal left ventricular systolic function with abnormal septal motion related to IVCD. Estimated EF 50%. E/e'=21. Trivial mitral regurgitation. 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 Mild tricuspid regurgitation with RVSP estimated at 27 mmHg. Full PFT's:04/05/2019   INTERPRETATION:  Spirometry reveals decreased FVC at 2.1 liters, which  is 69% predicted. FEV1 is decreased at 1.29 liters, which is 56%  predicted. FEV1/FVC ratio is reduced at 61%.   Lung volumes reveal  normal total lung capacity, vital capacity and residual volume. Diffusion capacity is reduced at 55% predicted. IMPRESSION:  Moderate restrictive lung disease. Stress study:6/2/20  Summary     Mild anterior hypokinesis with EF 50%     Small-medium sized anterior completely reversible defect of moderate     intensity consistent with ischemia in the territory of the proximal LAD . Recommendation     Covid testing will be done for urgent cardiac cath which will be schedule     within next week           LHC:6/9/20  Left Heart Cath  Dominance : Right   LM: normal   LAD: tandem 80% mildly calcified proximal stenoses; 50% mid plaque disease  LCx: luminal irregularities  RCA:  Proximal stent with 30-40% in stent restenosis; 50% mid lesion     6Fr XB 3.5 Guide  Runthrough wire to LCx  BMW wire to distal LAD     2.5 x 8 mm Trek predil  2.75 x 20 mm Trek predil      3.0 x 23 mm Xience KYM near ostial LAD     3.5 x 12 mm NC Trek ultimately to 14 SCOOTER at distal edge and 20 SCOOTER at proximal edge  20% proximal residual      Impression/Recommendations:  KYM-proximal LAD  ASA long term  Clopidogrel loaded in lab and for 12 months  Aggrastat for 6 hours with sheath removal thereafter  Continue home medications. Observe in CVU overnight. Carotid Duplex:7/22/20  Right Impression    The right vertebral artery demonstrates abnormal to-fro flow. The right subclavian artery is visualized and demonstrates monophasic flow. possible stenosis of the brachiocephalic artery. Left Impression    The left internal carotid artery demonstrates no significant stenosis. The left vertebral artery demonstrates normal antegrade flow. The left subclavian artery is visualized and demonstrates triphasic flow. Carotid bulb appears to have homogenous plaque formation. Echo:7/22/20  Summary  There is mild concentric left ventricular hypertrophy.   Endocardium is not well visualized, EF may be reduced   50%, however  recommend repeat study with definity  Grade I diastolic dysfunction with normal LV filling pressures. Assessment/Plan:     CAD  She denies any symptoms representing angina. Her last cardiac cath 6/2020 revealed KYM proximal LAD with residual 50% mid plaque disease LAD, proximal RCA stent with 30-40% in stent restenosis, 50% mid RCA lesion. Pt is on BBlocker, Statin and dual antiplatelet therapy    HTN, essential   Well controlled today in the office. Continue current medical management. BMP stable 7/18/22. Hyperkalemia   K 5.3 7/18/22   Reduce prune intake  Monitor diet   Repeat in 1 month     Hyperlipidemia, unspecified   She denies myalgias and has remained stable since our last visit  2/12/21 with LDLc 65.   7/18/22 LDLc 68, Alb. Elevated, total protein 6.3. Continue high potency statin therapy Lipitor 40 mg daily. Right Brachiocephalic artery stenosis  Per duplex study 7/22/20. No treatment needed at this time. Bilateral Carotid Bruit  7/22/20 Bilateral Carotid Duplex with no carotid stenosis but revealed R brachiocephalic artery stenosis. She denies hx of TIA/CVA. Medical therapy DAPT, statin and ACEI. Bradycardia  Asymptomatic. Normal rate/rhythm today. Moderate COPD/Centrilobular Emphysema/former smoker   Last Full PFT 4/2019 moderate restrictive lung disease. Medical therapy includes Anora inhaler, singular, albuterol. Managed per Dr. Carmella Connelly with routine follow up. Anemia   Stable 7/18/22 wnl. Follow up in 8 months. Thank you very much for allowing me to participate in the care of your patient. Please do not hesitate to contact me if you have any questions. Sincerely,  Ravindra Padilla MD      [address] [place] [address]  Ph: [phone]  Fax: [phone]    This note was scribed in the presence of Dr. Dianne Batres MD by Torie Buitrago RN.

## 2022-11-01 ENCOUNTER — TELEPHONE (OUTPATIENT)
Dept: PULMONOLOGY | Age: 77
End: 2022-11-01

## 2022-11-01 NOTE — TELEPHONE ENCOUNTER
Last appt:1/18/22  Next appt:1/17/23  Medication matches medication on Epic list    Call routed to Jerald Ring

## 2022-12-12 DIAGNOSIS — E78.2 MIXED HYPERLIPIDEMIA: Chronic | ICD-10-CM

## 2022-12-12 DIAGNOSIS — I25.10 ATHEROSCLEROSIS OF NATIVE CORONARY ARTERY OF NATIVE HEART WITHOUT ANGINA PECTORIS: Chronic | ICD-10-CM

## 2022-12-12 RX ORDER — ATORVASTATIN CALCIUM 40 MG/1
TABLET, FILM COATED ORAL
Qty: 90 TABLET | Refills: 3 | Status: SHIPPED | OUTPATIENT
Start: 2022-12-12

## 2023-01-17 ENCOUNTER — OFFICE VISIT (OUTPATIENT)
Dept: PULMONOLOGY | Age: 78
End: 2023-01-17
Payer: MEDICARE

## 2023-01-17 VITALS
OXYGEN SATURATION: 98 % | DIASTOLIC BLOOD PRESSURE: 80 MMHG | BODY MASS INDEX: 26.49 KG/M2 | HEIGHT: 65 IN | SYSTOLIC BLOOD PRESSURE: 136 MMHG | WEIGHT: 159 LBS | RESPIRATION RATE: 16 BRPM | TEMPERATURE: 97.1 F | HEART RATE: 67 BPM

## 2023-01-17 DIAGNOSIS — J44.9 MODERATE COPD (CHRONIC OBSTRUCTIVE PULMONARY DISEASE) (HCC): Primary | ICD-10-CM

## 2023-01-17 DIAGNOSIS — Z72.0 TOBACCO ABUSE: ICD-10-CM

## 2023-01-17 PROCEDURE — 3079F DIAST BP 80-89 MM HG: CPT | Performed by: INTERNAL MEDICINE

## 2023-01-17 PROCEDURE — G8484 FLU IMMUNIZE NO ADMIN: HCPCS | Performed by: INTERNAL MEDICINE

## 2023-01-17 PROCEDURE — 1036F TOBACCO NON-USER: CPT | Performed by: INTERNAL MEDICINE

## 2023-01-17 PROCEDURE — 3075F SYST BP GE 130 - 139MM HG: CPT | Performed by: INTERNAL MEDICINE

## 2023-01-17 PROCEDURE — G8400 PT W/DXA NO RESULTS DOC: HCPCS | Performed by: INTERNAL MEDICINE

## 2023-01-17 PROCEDURE — 1090F PRES/ABSN URINE INCON ASSESS: CPT | Performed by: INTERNAL MEDICINE

## 2023-01-17 PROCEDURE — G8417 CALC BMI ABV UP PARAM F/U: HCPCS | Performed by: INTERNAL MEDICINE

## 2023-01-17 PROCEDURE — G8427 DOCREV CUR MEDS BY ELIG CLIN: HCPCS | Performed by: INTERNAL MEDICINE

## 2023-01-17 PROCEDURE — 3023F SPIROM DOC REV: CPT | Performed by: INTERNAL MEDICINE

## 2023-01-17 PROCEDURE — 1123F ACP DISCUSS/DSCN MKR DOCD: CPT | Performed by: INTERNAL MEDICINE

## 2023-01-17 PROCEDURE — 99213 OFFICE O/P EST LOW 20 MIN: CPT | Performed by: INTERNAL MEDICINE

## 2023-01-17 RX ORDER — UMECLIDINIUM BROMIDE AND VILANTEROL TRIFENATATE 62.5; 25 UG/1; UG/1
1 POWDER RESPIRATORY (INHALATION) DAILY
Qty: 1 EACH | Refills: 11 | Status: SHIPPED | OUTPATIENT
Start: 2023-01-17

## 2023-01-17 ASSESSMENT — ENCOUNTER SYMPTOMS: RESPIRATORY NEGATIVE: 1

## 2023-01-17 ASSESSMENT — COPD QUESTIONNAIRES
QUESTION6_LEAVINGHOUSE: 0
QUESTION1_COUGHFREQUENCY: 1
QUESTION8_ENERGYLEVEL: 1
CAT_TOTALSCORE: 4
QUESTION3_CHESTTIGHTNESS: 0
QUESTION5_HOMEACTIVITIES: 0
QUESTION4_WALKINCLINE: 1
QUESTION2_CHESTPHLEGM: 1
QUESTION7_SLEEPQUALITY: 0

## 2023-01-17 NOTE — PROGRESS NOTES
[address] [facility], SLEEP, AND CRITICAL CARE   Dara Nguyen (:  1945) is a 69 y.o. female,Established patient, here for evaluation of the following chief complaint(s):  COPD         ASSESSMENT/PLAN:  1. Moderate COPD (chronic obstructive pulmonary disease) ([facility])  Assessment & Plan:   - FEV1 50%, recently had acute bronchitis but did not have an acute exacerbation.  -Did not need steroids nor hospitalization  -Continue Anoro daily, albuterol as needed  -Former smoker quit greater than 20 years ago  Orders:  -     Umeclidinium-Vilanterol (ANORO ELLIPTA) 62.5-25 MCG/ACT AEPB; Inhale 1 puff into the lungs daily, Disp-1 each, R-11Normal  2. Tobacco abuse  Assessment & Plan:  - Quit smoking more than 20 years ago      Return in about 1 year (around 2024). No future appointments. Subjective   SUBJECTIVE/OBJECTIVE:  Since last visit no hospitalizations or exacerbations. Well on Anoro. Uses albuterol very infrequently  -Former smoker      Review of Systems   Constitutional: Negative. Respiratory: Negative. Cardiovascular: Negative. Musculoskeletal: Negative. Neurological: Negative. Objective   Physical Exam    Gen:  No acute distress. Eyes: PERRL. EOMI. Anicteric sclera. No conjunctival injection. ENT: No discharge. oropharynx clear. External appearance of ears and nose normal.  Neck: Trachea midline. No mass   Resp:  No crackles. No wheezes. No rhonchi. No dullness on percussion. CV: Regular rate. Regular rhythm. No murmur or rub. No edema. GI: Soft, Non-tender. Non-distended. +BS  Skin: Warm, dry, w/o erythema. Lymph: No cervical or supraclavicular LAD. M/S: No cyanosis. No clubbing. Neuro:  no focal neurologic deficit. Moves all extremities  Psych: Awake and alert, Oriented x 3. Judgement and insight appropriate. Mood stable.       I spent 21 minutes on this case today, >50% was face-to-face in discussion of the diagnosis and the coordination of care in regards to the treatment plan. All questions answered. An electronic signature was used to authenticate this note.     --Steve Garcia MD

## 2023-01-17 NOTE — ASSESSMENT & PLAN NOTE
- FEV1 50%, recently had acute bronchitis but did not have an acute exacerbation.  -Did not need steroids nor hospitalization  -Continue Anoro daily, albuterol as needed  -Former smoker quit greater than 20 years ago

## 2023-02-06 DIAGNOSIS — J44.9 MODERATE COPD (CHRONIC OBSTRUCTIVE PULMONARY DISEASE) (HCC): ICD-10-CM

## 2023-02-07 RX ORDER — UMECLIDINIUM BROMIDE AND VILANTEROL TRIFENATATE 62.5; 25 UG/1; UG/1
1 POWDER RESPIRATORY (INHALATION) DAILY
Qty: 1 EACH | Refills: 11 | Status: SHIPPED | OUTPATIENT
Start: 2023-02-07

## 2023-05-04 ENCOUNTER — OFFICE VISIT (OUTPATIENT)
Dept: CARDIOLOGY CLINIC | Age: 78
End: 2023-05-04

## 2023-05-04 VITALS
WEIGHT: 158 LBS | SYSTOLIC BLOOD PRESSURE: 134 MMHG | HEART RATE: 58 BPM | HEIGHT: 65 IN | BODY MASS INDEX: 26.33 KG/M2 | OXYGEN SATURATION: 97 % | DIASTOLIC BLOOD PRESSURE: 72 MMHG

## 2023-05-04 DIAGNOSIS — I10 ESSENTIAL HYPERTENSION: ICD-10-CM

## 2023-05-04 DIAGNOSIS — E78.2 MIXED HYPERLIPIDEMIA: ICD-10-CM

## 2023-05-04 DIAGNOSIS — I25.10 CORONARY ARTERY DISEASE INVOLVING NATIVE CORONARY ARTERY OF NATIVE HEART WITHOUT ANGINA PECTORIS: Primary | Chronic | ICD-10-CM

## 2023-05-04 DIAGNOSIS — R00.1 BRADYCARDIA: ICD-10-CM

## 2023-05-04 DIAGNOSIS — Z86.39 HISTORY OF ELEVATED GLUCOSE: ICD-10-CM

## 2023-05-04 DIAGNOSIS — E87.5 HYPERKALEMIA: ICD-10-CM

## 2023-05-04 NOTE — PROGRESS NOTES
[facility]      Cardiology Progress Note     Azeb Peoples  1945    May 5, 2023    CC: \"I am staying active with no heart symptoms. \"     HPI:  The patient is 69 y.o. female with a past medical history significant for COPD/emphysema, CAD, CMP, HTN, HLD presents per Dr. Marcela Redd as a second opinion for ongoing off/on SOB and dizziness. Primary Cardiologist Dr. Cuello Can and seen in the [facility] 6/1/20 for left sided chest pain x2 weeks, most notiable in bed with some exertional component. As reported, she has chronic mild CONNOR and was unchanged at that time. She had also reported increase in life stressors with a recent move. Stress study was completed on 6/2/20 which was abnormal. She was set up for a [facility] 6/9/20 revealing 80% proximal LAD stenosis s/p Xience KYM near ostial LAD with residual disease per Dr Mercy Infante. She presents today per Dr. Marcela Redd for further assessment of SOB and dizziness that occurs off/on. She presents today for 8 month follow up. She reports she remains symptom free since our last visit. She is working out 2 days per week, working in retail a few days per week with standing and walking for 4 hours period at a time and cleans her house and her [employer] kitchen. Patient denies any symptoms of chest pain, pressure, tightness, shortness of breath at rest, CONNOR, nausea, vomiting, near syncope, syncope, heart racing, palpitations, dizziness, lightheaded, PND, orthopnea, wheezing, diaphoresis, BLE edema, bilateral lower extremities pain, cramping or fatigue. She admits to medical therapy compliance and tolerating. She denies abnormal bruising or bleeding. Reports dizziness last summer and believed it to be plavix, went off and resolved.      Past Medical History:   Diagnosis Date    Bruising     CAD (coronary artery disease)     Cardiomyopathy ([address]     Emphysema     Hyperlipidemia     Hypertension     Myocardial infarct [facility])      Past Surgical History:

## 2023-05-12 DIAGNOSIS — E87.5 HYPERKALEMIA: ICD-10-CM

## 2023-05-12 DIAGNOSIS — I10 ESSENTIAL HYPERTENSION: ICD-10-CM

## 2023-05-12 DIAGNOSIS — R00.1 BRADYCARDIA: ICD-10-CM

## 2023-05-12 DIAGNOSIS — I25.10 CORONARY ARTERY DISEASE INVOLVING NATIVE CORONARY ARTERY OF NATIVE HEART WITHOUT ANGINA PECTORIS: Chronic | ICD-10-CM

## 2023-05-12 DIAGNOSIS — E78.2 MIXED HYPERLIPIDEMIA: ICD-10-CM

## 2023-05-12 DIAGNOSIS — Z86.39 HISTORY OF ELEVATED GLUCOSE: ICD-10-CM

## 2023-05-12 LAB
ALBUMIN SERPL-MCNC: 4.5 G/DL (ref 3.4–5)
ALBUMIN/GLOB SERPL: 2.4 {RATIO} (ref 1.1–2.2)
ALP SERPL-CCNC: 77 U/L (ref 40–129)
ALT SERPL-CCNC: 13 U/L (ref 10–40)
ANION GAP SERPL CALCULATED.3IONS-SCNC: 9 MMOL/L (ref 3–16)
AST SERPL-CCNC: 16 U/L (ref 15–37)
BILIRUB SERPL-MCNC: 0.6 MG/DL (ref 0–1)
BUN SERPL-MCNC: 16 MG/DL (ref 7–20)
CALCIUM SERPL-MCNC: 9.8 MG/DL (ref 8.3–10.6)
CHLORIDE SERPL-SCNC: 101 MMOL/L (ref 99–110)
CHOLEST SERPL-MCNC: 142 MG/DL (ref 0–199)
CO2 SERPL-SCNC: 29 MMOL/L (ref 21–32)
CREAT SERPL-MCNC: 0.6 MG/DL (ref 0.6–1.2)
DEPRECATED RDW RBC AUTO: 13.2 % (ref 12.4–15.4)
GFR SERPLBLD CREATININE-BSD FMLA CKD-EPI: >60 ML/MIN/{1.73_M2}
GLUCOSE SERPL-MCNC: 96 MG/DL (ref 70–99)
HCT VFR BLD AUTO: 40.9 % (ref 36–48)
HDLC SERPL-MCNC: 72 MG/DL (ref 40–60)
HGB BLD-MCNC: 13.6 G/DL (ref 12–16)
LDL CHOLESTEROL CALCULATED: 55 MG/DL
MCH RBC QN AUTO: 30.2 PG (ref 26–34)
MCHC RBC AUTO-ENTMCNC: 33.3 G/DL (ref 31–36)
MCV RBC AUTO: 90.8 FL (ref 80–100)
PLATELET # BLD AUTO: 213 K/UL (ref 135–450)
PMV BLD AUTO: 7.7 FL (ref 5–10.5)
POTASSIUM SERPL-SCNC: 5.5 MMOL/L (ref 3.5–5.1)
PROT SERPL-MCNC: 6.4 G/DL (ref 6.4–8.2)
RBC # BLD AUTO: 4.5 M/UL (ref 4–5.2)
SODIUM SERPL-SCNC: 139 MMOL/L (ref 136–145)
TRIGL SERPL-MCNC: 77 MG/DL (ref 0–150)
TSH SERPL DL<=0.005 MIU/L-ACNC: 3.07 UIU/ML (ref 0.27–4.2)
VLDLC SERPL CALC-MCNC: 15 MG/DL
WBC # BLD AUTO: 4.4 K/UL (ref 4–11)

## 2023-05-13 LAB
EST. AVERAGE GLUCOSE BLD GHB EST-MCNC: 116.9 MG/DL
HBA1C MFR BLD: 5.7 %

## 2023-05-19 ENCOUNTER — TELEPHONE (OUTPATIENT)
Dept: CARDIOLOGY CLINIC | Age: 78
End: 2023-05-19

## 2023-05-19 DIAGNOSIS — E87.5 HYPERKALEMIA: Primary | ICD-10-CM

## 2023-05-19 RX ORDER — HYDROCHLOROTHIAZIDE 25 MG/1
25 TABLET ORAL EVERY MORNING
Qty: 90 TABLET | Refills: 3 | Status: SHIPPED | OUTPATIENT
Start: 2023-05-19

## 2023-05-19 NOTE — TELEPHONE ENCOUNTER
Dr. Jerald Hernandez, please review and advise in Dr. Kristel stiles. Lab work looks normal except potassium of 5.5. Previously 5.3 on 7/18/22 and 1/24/22. Per Dr. Silvia Almaguer note 5/4/23 \"previously reduced prune intake, monitor diet. \"

## 2023-05-19 NOTE — TELEPHONE ENCOUNTER
Freddie Esquivel called in for the results of her labs that she completed last Friday. I did relay that Dr. Piter La is out of the office but can have someone give the results after being reviewed. Rosa Isela haywood/brent. She asked that when calling to [facility] as she may be in aerobics class.        Rosa Isela's callback: [phone]

## 2023-05-19 NOTE — TELEPHONE ENCOUNTER
Spoke with patient. Agreeable to switch to HCTZ 25 mg daily. Instructed to repeat BMP in 2-3 week - patient wants to wait until she sees her PCP at the beginning of July. Just encouraged patient to complete as close to 2-3 weeks as possible. She v/u.

## 2023-05-25 ENCOUNTER — TELEPHONE (OUTPATIENT)
Dept: CARDIOLOGY CLINIC | Age: 78
End: 2023-05-25

## 2023-05-25 NOTE — TELEPHONE ENCOUNTER
John Botello called in this afternoon, she has stopped taking       hydroCHLOROthiazide (HYDRODIURIL) 25 MG tablet     She is in the restroom all the time, wakes up in the middle of the night, she is tired and has sore muscles.

## 2023-05-25 NOTE — TELEPHONE ENCOUNTER
Patient refuses to take HCTZ-  she is not able to tolerate with significant increase in urination, muscle pain, cramping, dryness in throat, off balance. She cut 25 mg tablet in 1/2 and was the same symptoms. Does not want to take anything until 6/2/2023. BP at home is stable. June f/u with PCP-yearly. Call if consistently elevated.

## 2023-06-02 DIAGNOSIS — E87.5 HYPERKALEMIA: ICD-10-CM

## 2023-06-02 LAB
ANION GAP SERPL CALCULATED.3IONS-SCNC: 7 MMOL/L (ref 3–16)
BUN SERPL-MCNC: 15 MG/DL (ref 7–20)
CALCIUM SERPL-MCNC: 9.6 MG/DL (ref 8.3–10.6)
CHLORIDE SERPL-SCNC: 99 MMOL/L (ref 99–110)
CO2 SERPL-SCNC: 31 MMOL/L (ref 21–32)
CREAT SERPL-MCNC: 0.6 MG/DL (ref 0.6–1.2)
GFR SERPLBLD CREATININE-BSD FMLA CKD-EPI: >60 ML/MIN/{1.73_M2}
GLUCOSE SERPL-MCNC: 104 MG/DL (ref 70–99)
POTASSIUM SERPL-SCNC: 5.1 MMOL/L (ref 3.5–5.1)
SODIUM SERPL-SCNC: 137 MMOL/L (ref 136–145)

## 2023-06-21 ENCOUNTER — HOSPITAL ENCOUNTER (INPATIENT)
Age: 78
LOS: 1 days | Discharge: HOME | DRG: 313 | End: 2023-06-24
Attending: INTERNAL MEDICINE | Admitting: INTERNAL MEDICINE
Payer: MEDICARE

## 2023-06-21 ENCOUNTER — TELEPHONE (OUTPATIENT)
Dept: CARDIOLOGY CLINIC | Age: 78
End: 2023-06-21

## 2023-06-21 ENCOUNTER — APPOINTMENT (OUTPATIENT)
Dept: GENERAL RADIOLOGY | Age: 78
DRG: 313 | End: 2023-06-21
Payer: MEDICARE

## 2023-06-21 DIAGNOSIS — Z71.89 GOALS OF CARE, COUNSELING/DISCUSSION: ICD-10-CM

## 2023-06-21 DIAGNOSIS — R07.9 CHEST PAIN, UNSPECIFIED TYPE: Primary | ICD-10-CM

## 2023-06-21 LAB
ALBUMIN SERPL-MCNC: 4.3 G/DL (ref 3.4–5)
ALBUMIN/GLOB SERPL: 2 {RATIO} (ref 1.1–2.2)
ALP SERPL-CCNC: 75 U/L (ref 40–129)
ALT SERPL-CCNC: 15 U/L (ref 10–40)
ANION GAP SERPL CALCULATED.3IONS-SCNC: 12 MMOL/L (ref 3–16)
AST SERPL-CCNC: 20 U/L (ref 15–37)
BASOPHILS # BLD: 0 K/UL (ref 0–0.2)
BASOPHILS NFR BLD: 0.5 %
BILIRUB SERPL-MCNC: 1 MG/DL (ref 0–1)
BUN SERPL-MCNC: 13 MG/DL (ref 7–20)
CALCIUM SERPL-MCNC: 9.4 MG/DL (ref 8.3–10.6)
CHLORIDE SERPL-SCNC: 95 MMOL/L (ref 99–110)
CO2 SERPL-SCNC: 26 MMOL/L (ref 21–32)
CREAT SERPL-MCNC: 0.7 MG/DL (ref 0.6–1.2)
D DIMER: <0.27 UG/ML FEU (ref 0–0.6)
DEPRECATED RDW RBC AUTO: 12.9 % (ref 12.4–15.4)
EKG ATRIAL RATE: 56 BPM
EKG DIAGNOSIS: NORMAL
EKG P AXIS: 71 DEGREES
EKG P-R INTERVAL: 154 MS
EKG Q-T INTERVAL: 462 MS
EKG QRS DURATION: 128 MS
EKG QTC CALCULATION (BAZETT): 445 MS
EKG R AXIS: 51 DEGREES
EKG T AXIS: 97 DEGREES
EKG VENTRICULAR RATE: 56 BPM
EOSINOPHIL # BLD: 0.1 K/UL (ref 0–0.6)
EOSINOPHIL NFR BLD: 0.9 %
GFR SERPLBLD CREATININE-BSD FMLA CKD-EPI: >60 ML/MIN/{1.73_M2}
GLUCOSE SERPL-MCNC: 107 MG/DL (ref 70–99)
HCT VFR BLD AUTO: 37.5 % (ref 36–48)
HGB BLD-MCNC: 12.9 G/DL (ref 12–16)
LYMPHOCYTES # BLD: 1.7 K/UL (ref 1–5.1)
LYMPHOCYTES NFR BLD: 29.1 %
MCH RBC QN AUTO: 31 PG (ref 26–34)
MCHC RBC AUTO-ENTMCNC: 34.4 G/DL (ref 31–36)
MCV RBC AUTO: 90.1 FL (ref 80–100)
MONOCYTES # BLD: 0.3 K/UL (ref 0–1.3)
MONOCYTES NFR BLD: 4.4 %
NEUTROPHILS # BLD: 3.9 K/UL (ref 1.7–7.7)
NEUTROPHILS NFR BLD: 65.1 %
PLATELET # BLD AUTO: 209 K/UL (ref 135–450)
PMV BLD AUTO: 7.2 FL (ref 5–10.5)
POTASSIUM SERPL-SCNC: 4.8 MMOL/L (ref 3.5–5.1)
PROT SERPL-MCNC: 6.4 G/DL (ref 6.4–8.2)
RBC # BLD AUTO: 4.17 M/UL (ref 4–5.2)
SODIUM SERPL-SCNC: 133 MMOL/L (ref 136–145)
TROPONIN, HIGH SENSITIVITY: 9 NG/L (ref 0–14)
TROPONIN, HIGH SENSITIVITY: 9 NG/L (ref 0–14)
WBC # BLD AUTO: 5.9 K/UL (ref 4–11)

## 2023-06-21 PROCEDURE — G0378 HOSPITAL OBSERVATION PER HR: HCPCS

## 2023-06-21 PROCEDURE — 6370000000 HC RX 637 (ALT 250 FOR IP)

## 2023-06-21 PROCEDURE — 96374 THER/PROPH/DIAG INJ IV PUSH: CPT

## 2023-06-21 PROCEDURE — 96372 THER/PROPH/DIAG INJ SC/IM: CPT

## 2023-06-21 PROCEDURE — 71046 X-RAY EXAM CHEST 2 VIEWS: CPT

## 2023-06-21 PROCEDURE — 6360000002 HC RX W HCPCS: Performed by: INTERNAL MEDICINE

## 2023-06-21 PROCEDURE — 93005 ELECTROCARDIOGRAM TRACING: CPT | Performed by: INTERNAL MEDICINE

## 2023-06-21 PROCEDURE — 99285 EMERGENCY DEPT VISIT HI MDM: CPT

## 2023-06-21 PROCEDURE — 6370000000 HC RX 637 (ALT 250 FOR IP): Performed by: INTERNAL MEDICINE

## 2023-06-21 PROCEDURE — 93010 ELECTROCARDIOGRAM REPORT: CPT | Performed by: INTERNAL MEDICINE

## 2023-06-21 PROCEDURE — 2580000003 HC RX 258: Performed by: INTERNAL MEDICINE

## 2023-06-21 PROCEDURE — 36415 COLL VENOUS BLD VENIPUNCTURE: CPT

## 2023-06-21 PROCEDURE — 84484 ASSAY OF TROPONIN QUANT: CPT

## 2023-06-21 PROCEDURE — 80053 COMPREHEN METABOLIC PANEL: CPT

## 2023-06-21 PROCEDURE — 85025 COMPLETE CBC W/AUTO DIFF WBC: CPT

## 2023-06-21 PROCEDURE — 85379 FIBRIN DEGRADATION QUANT: CPT

## 2023-06-21 RX ORDER — LANOLIN ALCOHOL/MO/W.PET/CERES
3 CREAM (GRAM) TOPICAL NIGHTLY PRN
Status: DISCONTINUED | OUTPATIENT
Start: 2023-06-21 | End: 2023-06-24 | Stop reason: HOSPADM

## 2023-06-21 RX ORDER — SODIUM CHLORIDE 9 MG/ML
INJECTION, SOLUTION INTRAVENOUS PRN
Status: DISCONTINUED | OUTPATIENT
Start: 2023-06-21 | End: 2023-06-24 | Stop reason: HOSPADM

## 2023-06-21 RX ORDER — VITAMIN B COMPLEX
2000 TABLET ORAL DAILY
Status: DISCONTINUED | OUTPATIENT
Start: 2023-06-21 | End: 2023-06-24 | Stop reason: HOSPADM

## 2023-06-21 RX ORDER — ASPIRIN 81 MG/1
324 TABLET, CHEWABLE ORAL ONCE
Status: COMPLETED | OUTPATIENT
Start: 2023-06-21 | End: 2023-06-21

## 2023-06-21 RX ORDER — ALBUTEROL SULFATE 90 UG/1
2 AEROSOL, METERED RESPIRATORY (INHALATION) EVERY 6 HOURS PRN
Status: DISCONTINUED | OUTPATIENT
Start: 2023-06-21 | End: 2023-06-24 | Stop reason: HOSPADM

## 2023-06-21 RX ORDER — ATORVASTATIN CALCIUM 40 MG/1
40 TABLET, FILM COATED ORAL NIGHTLY
Status: DISCONTINUED | OUTPATIENT
Start: 2023-06-21 | End: 2023-06-21 | Stop reason: SDUPTHER

## 2023-06-21 RX ORDER — ATORVASTATIN CALCIUM 40 MG/1
40 TABLET, FILM COATED ORAL DAILY
Status: DISCONTINUED | OUTPATIENT
Start: 2023-06-21 | End: 2023-06-24 | Stop reason: HOSPADM

## 2023-06-21 RX ORDER — CLONIDINE HYDROCHLORIDE 0.1 MG/1
0.1 TABLET ORAL EVERY 4 HOURS PRN
Status: DISCONTINUED | OUTPATIENT
Start: 2023-06-21 | End: 2023-06-22

## 2023-06-21 RX ORDER — ASPIRIN 81 MG/1
81 TABLET, CHEWABLE ORAL DAILY
Status: DISCONTINUED | OUTPATIENT
Start: 2023-06-22 | End: 2023-06-21 | Stop reason: SDUPTHER

## 2023-06-21 RX ORDER — MORPHINE SULFATE 2 MG/ML
2 INJECTION, SOLUTION INTRAMUSCULAR; INTRAVENOUS EVERY 4 HOURS PRN
Status: DISCONTINUED | OUTPATIENT
Start: 2023-06-21 | End: 2023-06-24 | Stop reason: HOSPADM

## 2023-06-21 RX ORDER — NITROGLYCERIN 0.4 MG/1
0.4 TABLET SUBLINGUAL EVERY 5 MIN PRN
Status: DISCONTINUED | OUTPATIENT
Start: 2023-06-21 | End: 2023-06-21 | Stop reason: SDUPTHER

## 2023-06-21 RX ORDER — SODIUM CHLORIDE 0.9 % (FLUSH) 0.9 %
5-40 SYRINGE (ML) INJECTION PRN
Status: DISCONTINUED | OUTPATIENT
Start: 2023-06-21 | End: 2023-06-23

## 2023-06-21 RX ORDER — NITROGLYCERIN 0.4 MG/1
0.4 TABLET SUBLINGUAL EVERY 5 MIN PRN
Status: DISCONTINUED | OUTPATIENT
Start: 2023-06-21 | End: 2023-06-24 | Stop reason: HOSPADM

## 2023-06-21 RX ORDER — MAGNESIUM HYDROXIDE/ALUMINUM HYDROXICE/SIMETHICONE 120; 1200; 1200 MG/30ML; MG/30ML; MG/30ML
30 SUSPENSION ORAL EVERY 6 HOURS PRN
Status: DISCONTINUED | OUTPATIENT
Start: 2023-06-21 | End: 2023-06-24 | Stop reason: HOSPADM

## 2023-06-21 RX ORDER — ONDANSETRON 2 MG/ML
4 INJECTION INTRAMUSCULAR; INTRAVENOUS EVERY 6 HOURS PRN
Status: DISCONTINUED | OUTPATIENT
Start: 2023-06-21 | End: 2023-06-24 | Stop reason: HOSPADM

## 2023-06-21 RX ORDER — POLYETHYLENE GLYCOL 3350 17 G/17G
17 POWDER, FOR SOLUTION ORAL DAILY PRN
Status: DISCONTINUED | OUTPATIENT
Start: 2023-06-21 | End: 2023-06-23

## 2023-06-21 RX ORDER — ACETAMINOPHEN 650 MG/1
650 SUPPOSITORY RECTAL EVERY 6 HOURS PRN
Status: DISCONTINUED | OUTPATIENT
Start: 2023-06-21 | End: 2023-06-24 | Stop reason: HOSPADM

## 2023-06-21 RX ORDER — LORAZEPAM 0.5 MG/1
0.5 TABLET ORAL EVERY 4 HOURS PRN
Status: DISCONTINUED | OUTPATIENT
Start: 2023-06-21 | End: 2023-06-24 | Stop reason: HOSPADM

## 2023-06-21 RX ORDER — ACETAMINOPHEN 325 MG/1
650 TABLET ORAL EVERY 6 HOURS PRN
Status: DISCONTINUED | OUTPATIENT
Start: 2023-06-21 | End: 2023-06-24 | Stop reason: HOSPADM

## 2023-06-21 RX ORDER — CARVEDILOL 12.5 MG/1
12.5 TABLET ORAL 2 TIMES DAILY
Status: DISCONTINUED | OUTPATIENT
Start: 2023-06-21 | End: 2023-06-24 | Stop reason: HOSPADM

## 2023-06-21 RX ORDER — NITROGLYCERIN 0.4 MG/1
0.4 TABLET SUBLINGUAL EVERY 5 MIN PRN
Qty: 25 TABLET | Refills: 3 | Status: CANCELLED | OUTPATIENT
Start: 2023-06-21

## 2023-06-21 RX ORDER — ENOXAPARIN SODIUM 100 MG/ML
40 INJECTION SUBCUTANEOUS DAILY
Status: DISCONTINUED | OUTPATIENT
Start: 2023-06-21 | End: 2023-06-24 | Stop reason: HOSPADM

## 2023-06-21 RX ORDER — ASPIRIN 81 MG/1
81 TABLET ORAL DAILY
Status: DISCONTINUED | OUTPATIENT
Start: 2023-06-22 | End: 2023-06-24 | Stop reason: HOSPADM

## 2023-06-21 RX ORDER — MONTELUKAST SODIUM 10 MG/1
10 TABLET ORAL NIGHTLY
Status: DISCONTINUED | OUTPATIENT
Start: 2023-06-21 | End: 2023-06-24 | Stop reason: HOSPADM

## 2023-06-21 RX ORDER — ONDANSETRON 4 MG/1
4 TABLET, ORALLY DISINTEGRATING ORAL EVERY 8 HOURS PRN
Status: DISCONTINUED | OUTPATIENT
Start: 2023-06-21 | End: 2023-06-24 | Stop reason: HOSPADM

## 2023-06-21 RX ORDER — SODIUM CHLORIDE 0.9 % (FLUSH) 0.9 %
5-40 SYRINGE (ML) INJECTION EVERY 12 HOURS SCHEDULED
Status: DISCONTINUED | OUTPATIENT
Start: 2023-06-21 | End: 2023-06-23

## 2023-06-21 RX ADMIN — MONTELUKAST 10 MG: 10 TABLET, FILM COATED ORAL at 20:20

## 2023-06-21 RX ADMIN — ENOXAPARIN SODIUM 40 MG: 100 INJECTION SUBCUTANEOUS at 18:56

## 2023-06-21 RX ADMIN — CARVEDILOL 12.5 MG: 12.5 TABLET, FILM COATED ORAL at 20:20

## 2023-06-21 RX ADMIN — ATORVASTATIN CALCIUM 40 MG: 40 TABLET, FILM COATED ORAL at 20:20

## 2023-06-21 RX ADMIN — ASPIRIN 81 MG 324 MG: 81 TABLET ORAL at 11:42

## 2023-06-21 RX ADMIN — SODIUM CHLORIDE, PRESERVATIVE FREE 10 ML: 5 INJECTION INTRAVENOUS at 20:24

## 2023-06-21 ASSESSMENT — HEART SCORE: ECG: 1

## 2023-06-21 ASSESSMENT — ENCOUNTER SYMPTOMS
COUGH: 0
RHINORRHEA: 0
DIARRHEA: 0
SORE THROAT: 0
ABDOMINAL PAIN: 0
CONSTIPATION: 0
VOMITING: 0
EYE PAIN: 0
BACK PAIN: 0
SHORTNESS OF BREATH: 1
NAUSEA: 0

## 2023-06-21 ASSESSMENT — PAIN - FUNCTIONAL ASSESSMENT: PAIN_FUNCTIONAL_ASSESSMENT: 0-10

## 2023-06-21 ASSESSMENT — PAIN SCALES - GENERAL
PAINLEVEL_OUTOF10: 2
PAINLEVEL_OUTOF10: 0

## 2023-06-21 ASSESSMENT — PAIN DESCRIPTION - FREQUENCY: FREQUENCY: CONTINUOUS

## 2023-06-21 ASSESSMENT — PAIN DESCRIPTION - DESCRIPTORS: DESCRIPTORS: PRESSURE

## 2023-06-21 ASSESSMENT — PAIN DESCRIPTION - ORIENTATION: ORIENTATION: LEFT

## 2023-06-21 ASSESSMENT — PAIN DESCRIPTION - LOCATION: LOCATION: CHEST

## 2023-06-21 ASSESSMENT — PAIN DESCRIPTION - PAIN TYPE: TYPE: ACUTE PAIN

## 2023-06-21 NOTE — ED PROVIDER NOTES
considered but not done, Admit vs D/C, Shared Decision Making, Pt Expectation of Test or Tx.): above       I am the Primary Clinician of Record. FINAL IMPRESSION      1. Chest pain, unspecified type    2. Goals of care, counseling/discussion          DISPOSITION/PLAN     DISPOSITION Admitted 06/21/2023 03:50:35 PM      PATIENT REFERRED TO:  No follow-up provider specified.     DISCHARGE MEDICATIONS:  Current Discharge Medication List          DISCONTINUED MEDICATIONS:  Current Discharge Medication List                 (Please note that portions of this note were completed with a voice recognition program.  Efforts were made to edit the dictations but occasionally words are mis-transcribed.)    NINFA Goode CNP (electronically signed)        NINFA Goode CNP  06/21/23 1531

## 2023-06-21 NOTE — TELEPHONE ENCOUNTER
Nick Cardoso called in this morning, she states she wasn't feeling well yesterday and today she was feeling much worse.  She just drove herself to the ER     Symptoms - light headed, chest pain and SOB

## 2023-06-22 LAB
CHOLEST SERPL-MCNC: 123 MG/DL (ref 0–199)
DEPRECATED RDW RBC AUTO: 13 % (ref 12.4–15.4)
HCT VFR BLD AUTO: 36.6 % (ref 36–48)
HDLC SERPL-MCNC: 57 MG/DL (ref 40–60)
HGB BLD-MCNC: 12.7 G/DL (ref 12–16)
LDLC SERPL CALC-MCNC: 50 MG/DL
LV EF: 70 %
LVEF MODALITY: NORMAL
MCH RBC QN AUTO: 31 PG (ref 26–34)
MCHC RBC AUTO-ENTMCNC: 34.7 G/DL (ref 31–36)
MCV RBC AUTO: 89.2 FL (ref 80–100)
PLATELET # BLD AUTO: 196 K/UL (ref 135–450)
PMV BLD AUTO: 7.3 FL (ref 5–10.5)
RBC # BLD AUTO: 4.1 M/UL (ref 4–5.2)
TRIGL SERPL-MCNC: 78 MG/DL (ref 0–150)
VLDLC SERPL CALC-MCNC: 16 MG/DL
WBC # BLD AUTO: 5.2 K/UL (ref 4–11)

## 2023-06-22 PROCEDURE — 85027 COMPLETE CBC AUTOMATED: CPT

## 2023-06-22 PROCEDURE — G0378 HOSPITAL OBSERVATION PER HR: HCPCS

## 2023-06-22 PROCEDURE — 78452 HT MUSCLE IMAGE SPECT MULT: CPT

## 2023-06-22 PROCEDURE — 93017 CV STRESS TEST TRACING ONLY: CPT

## 2023-06-22 PROCEDURE — A9502 TC99M TETROFOSMIN: HCPCS | Performed by: INTERNAL MEDICINE

## 2023-06-22 PROCEDURE — 6360000002 HC RX W HCPCS: Performed by: INTERNAL MEDICINE

## 2023-06-22 PROCEDURE — 36415 COLL VENOUS BLD VENIPUNCTURE: CPT

## 2023-06-22 PROCEDURE — 6370000000 HC RX 637 (ALT 250 FOR IP): Performed by: INTERNAL MEDICINE

## 2023-06-22 PROCEDURE — 96372 THER/PROPH/DIAG INJ SC/IM: CPT

## 2023-06-22 PROCEDURE — 2580000003 HC RX 258: Performed by: INTERNAL MEDICINE

## 2023-06-22 PROCEDURE — 94640 AIRWAY INHALATION TREATMENT: CPT

## 2023-06-22 PROCEDURE — 3430000000 HC RX DIAGNOSTIC RADIOPHARMACEUTICAL: Performed by: INTERNAL MEDICINE

## 2023-06-22 PROCEDURE — 94761 N-INVAS EAR/PLS OXIMETRY MLT: CPT

## 2023-06-22 PROCEDURE — 80061 LIPID PANEL: CPT

## 2023-06-22 PROCEDURE — 99223 1ST HOSP IP/OBS HIGH 75: CPT | Performed by: INTERNAL MEDICINE

## 2023-06-22 PROCEDURE — 96374 THER/PROPH/DIAG INJ IV PUSH: CPT

## 2023-06-22 RX ORDER — LISINOPRIL 5 MG/1
10 TABLET ORAL DAILY
Qty: 30 TABLET | Refills: 3 | Status: SHIPPED
Start: 2023-06-22 | End: 2023-06-24 | Stop reason: HOSPADM

## 2023-06-22 RX ORDER — AMINOPHYLLINE DIHYDRATE 25 MG/ML
500 INJECTION, SOLUTION INTRAVENOUS ONCE
Status: COMPLETED | OUTPATIENT
Start: 2023-06-22 | End: 2023-06-22

## 2023-06-22 RX ORDER — REGADENOSON 0.08 MG/ML
0.4 INJECTION, SOLUTION INTRAVENOUS
Status: COMPLETED | OUTPATIENT
Start: 2023-06-22 | End: 2023-06-22

## 2023-06-22 RX ADMIN — CARVEDILOL 12.5 MG: 12.5 TABLET, FILM COATED ORAL at 22:00

## 2023-06-22 RX ADMIN — ENOXAPARIN SODIUM 40 MG: 100 INJECTION SUBCUTANEOUS at 09:17

## 2023-06-22 RX ADMIN — TETROFOSMIN 10 MILLICURIE: 1.38 INJECTION, POWDER, LYOPHILIZED, FOR SOLUTION INTRAVENOUS at 12:07

## 2023-06-22 RX ADMIN — TETROFOSMIN 30 MILLICURIE: 1.38 INJECTION, POWDER, LYOPHILIZED, FOR SOLUTION INTRAVENOUS at 13:20

## 2023-06-22 RX ADMIN — ASPIRIN 81 MG: 81 TABLET, COATED ORAL at 09:13

## 2023-06-22 RX ADMIN — Medication 2000 UNITS: at 09:16

## 2023-06-22 RX ADMIN — REGADENOSON 0.4 MG: 0.08 INJECTION, SOLUTION INTRAVENOUS at 13:09

## 2023-06-22 RX ADMIN — TIOTROPIUM BROMIDE AND OLODATEROL 2 PUFF: 3.124; 2.736 SPRAY, METERED RESPIRATORY (INHALATION) at 07:40

## 2023-06-22 RX ADMIN — SODIUM CHLORIDE, PRESERVATIVE FREE 10 ML: 5 INJECTION INTRAVENOUS at 22:17

## 2023-06-22 RX ADMIN — AMINOPHYLLINE 50 MG: 25 INJECTION, SOLUTION INTRAVENOUS at 13:17

## 2023-06-22 RX ADMIN — MONTELUKAST 10 MG: 10 TABLET, FILM COATED ORAL at 22:00

## 2023-06-22 RX ADMIN — ACETAMINOPHEN 650 MG: 325 TABLET ORAL at 09:14

## 2023-06-22 RX ADMIN — SODIUM CHLORIDE, PRESERVATIVE FREE 10 ML: 5 INJECTION INTRAVENOUS at 09:18

## 2023-06-22 RX ADMIN — CARVEDILOL 12.5 MG: 12.5 TABLET, FILM COATED ORAL at 09:13

## 2023-06-22 RX ADMIN — ATORVASTATIN CALCIUM 40 MG: 40 TABLET, FILM COATED ORAL at 22:00

## 2023-06-22 ASSESSMENT — PAIN SCALES - GENERAL
PAINLEVEL_OUTOF10: 0
PAINLEVEL_OUTOF10: 3

## 2023-06-22 ASSESSMENT — PAIN DESCRIPTION - DESCRIPTORS: DESCRIPTORS: ACHING

## 2023-06-22 ASSESSMENT — PAIN - FUNCTIONAL ASSESSMENT: PAIN_FUNCTIONAL_ASSESSMENT: ACTIVITIES ARE NOT PREVENTED

## 2023-06-22 ASSESSMENT — PAIN DESCRIPTION - ORIENTATION: ORIENTATION: ANTERIOR

## 2023-06-22 ASSESSMENT — PAIN DESCRIPTION - LOCATION: LOCATION: HEAD

## 2023-06-22 NOTE — CONSULTS
Referring Physician: * No referring provider recorded for this case *  Reason for Consultation: Chest pain and shortness of breath  Chief Complaint: Chest pain and shortness of breath      Subjective:   History of Present Illness:  Jonathan Castillo is a 69 y.o. patient well-known to me, with history of CAD s/p LAD stenting, hypertension hyperlipidemia COPD who presented to the hospital with complaints of sudden onset of left anterior chest pain along with shortness of breath which she has been been going on for few days pain started suddenly and has been she has been experiencing this pain with activity along with some shortness of breath. Since her symptoms persisted and she also felt dizzy she decided to come to the hospital.  She is not sure whether her symptoms are similar to what she experienced prior to her stenting. In the emergency room her cardiac work-up including EKG and troponin have been negative. I have been asked to provide consultation regarding further management and testing. Review of Systems:   All 12 point review of symptoms completed. Pertinent positives identified in the HPI, all other review of symptoms negative as below. Past Medical History:   has a past medical history of Bruising, CAD (coronary artery disease), Cardiomyopathy ([address] Emphysema, Hyperlipidemia, Hypertension, and Myocardial infarct ([address] Surgical History:   has a past surgical history that includes Appendectomy; Tubal ligation; Breast biopsy; Cholecystectomy; Subclavian Bypass Graft (1989); Cardiac surgery (2009); Colonoscopy; and hysteroscopy (09/09/2016). Social History:   reports that she quit smoking about 28 years ago. Her smoking use included cigarettes. She has a 37.00 pack-year smoking history. She has never used smokeless tobacco. She reports current alcohol use of about 2.0 standard drinks per week. She reports that she does not use drugs.      Family History:  family history includes

## 2023-06-22 NOTE — H&P
[address] [facility]                              HISTORY AND PHYSICAL    PATIENT NAME: Hayley Wu               :        1945  MED REC NO:   [id number]                          ROOM:       [id number]  ACCOUNT NO:   [de-identified]                           ADMIT DATE: 2023  PROVIDER:     Phoebe Elliott MD    Patient of Dr. Tram Alvarez. CHIEF COMPLAINT:  Chest pain and shortness of breath. HISTORY OF PRESENT ILLNESS:  The patient is generally healthy  67-year-old white  retired female, who lives in her own home. Yesterday on the morning of her admission, she had the sudden onset of  left anterior chest pain associated with shortness of breath and  anxiety. This prompted her to drive herself to the emergency room at  [facility].  Emergency room evaluation included an EKG with no acute  changes, normal chest x-ray, normal troponin, CBC was normal, chemistry  profile was normal, vital signs were normal except for mild elevation of  the blood pressure. The patient denied any fever or chills. No unusual  activity or travel. In view of the patient's previous history of  coronary artery disease without recent vascular evaluation admission was  felt necessary. I was contacted and agreed to admit the patient to [facility] on telemetry with Cardiology consultation. The patient was  currently active with Dr. Darby Ewing, the cardiologist here at [facility].    The patient's past medical history is pertinent for known coronary  artery disease with previous coronary artery stenting several years ago. The patient also think she had a coronary angiogram about 3 years ago  that showed some coronary artery stenosis, but not severe enough to  require intervention.     PAST MEDICAL HISTORY:  Pertinent for the above-mentioned coronary artery  disease, hypertension, COPD, remote subclavian to subclavian

## 2023-06-23 LAB
LV EF: 58 %
LVEF MODALITY: NORMAL

## 2023-06-23 PROCEDURE — 93306 TTE W/DOPPLER COMPLETE: CPT

## 2023-06-23 PROCEDURE — 2580000003 HC RX 258: Performed by: INTERNAL MEDICINE

## 2023-06-23 PROCEDURE — G0378 HOSPITAL OBSERVATION PER HR: HCPCS

## 2023-06-23 PROCEDURE — 6370000000 HC RX 637 (ALT 250 FOR IP): Performed by: INTERNAL MEDICINE

## 2023-06-23 PROCEDURE — 6360000002 HC RX W HCPCS: Performed by: INTERNAL MEDICINE

## 2023-06-23 PROCEDURE — 94760 N-INVAS EAR/PLS OXIMETRY 1: CPT

## 2023-06-23 PROCEDURE — C8929 TTE W OR WO FOL WCON,DOPPLER: HCPCS

## 2023-06-23 PROCEDURE — 94640 AIRWAY INHALATION TREATMENT: CPT

## 2023-06-23 PROCEDURE — 6360000004 HC RX CONTRAST MEDICATION: Performed by: INTERNAL MEDICINE

## 2023-06-23 PROCEDURE — 96372 THER/PROPH/DIAG INJ SC/IM: CPT

## 2023-06-23 RX ORDER — ENOXAPARIN SODIUM 100 MG/ML
40 INJECTION SUBCUTANEOUS DAILY
Status: DISCONTINUED | OUTPATIENT
Start: 2023-06-23 | End: 2023-06-23 | Stop reason: SDUPTHER

## 2023-06-23 RX ORDER — ONDANSETRON 2 MG/ML
4 INJECTION INTRAMUSCULAR; INTRAVENOUS EVERY 6 HOURS PRN
Status: DISCONTINUED | OUTPATIENT
Start: 2023-06-23 | End: 2023-06-24 | Stop reason: HOSPADM

## 2023-06-23 RX ORDER — SODIUM CHLORIDE 0.9 % (FLUSH) 0.9 %
5-40 SYRINGE (ML) INJECTION EVERY 12 HOURS SCHEDULED
Status: DISCONTINUED | OUTPATIENT
Start: 2023-06-23 | End: 2023-06-24 | Stop reason: HOSPADM

## 2023-06-23 RX ORDER — SODIUM CHLORIDE 0.9 % (FLUSH) 0.9 %
5-40 SYRINGE (ML) INJECTION PRN
Status: DISCONTINUED | OUTPATIENT
Start: 2023-06-23 | End: 2023-06-24 | Stop reason: HOSPADM

## 2023-06-23 RX ORDER — SODIUM CHLORIDE 9 MG/ML
INJECTION, SOLUTION INTRAVENOUS PRN
Status: DISCONTINUED | OUTPATIENT
Start: 2023-06-23 | End: 2023-06-24 | Stop reason: HOSPADM

## 2023-06-23 RX ORDER — ACETAMINOPHEN 325 MG/1
650 TABLET ORAL EVERY 6 HOURS PRN
Status: DISCONTINUED | OUTPATIENT
Start: 2023-06-23 | End: 2023-06-24 | Stop reason: HOSPADM

## 2023-06-23 RX ORDER — ACETAMINOPHEN 650 MG/1
650 SUPPOSITORY RECTAL EVERY 6 HOURS PRN
Status: DISCONTINUED | OUTPATIENT
Start: 2023-06-23 | End: 2023-06-24 | Stop reason: HOSPADM

## 2023-06-23 RX ORDER — ONDANSETRON 4 MG/1
4 TABLET, ORALLY DISINTEGRATING ORAL EVERY 8 HOURS PRN
Status: DISCONTINUED | OUTPATIENT
Start: 2023-06-23 | End: 2023-06-24 | Stop reason: HOSPADM

## 2023-06-23 RX ORDER — POLYETHYLENE GLYCOL 3350 17 G/17G
17 POWDER, FOR SOLUTION ORAL DAILY PRN
Status: DISCONTINUED | OUTPATIENT
Start: 2023-06-23 | End: 2023-06-24 | Stop reason: HOSPADM

## 2023-06-23 RX ADMIN — ATORVASTATIN CALCIUM 40 MG: 40 TABLET, FILM COATED ORAL at 21:08

## 2023-06-23 RX ADMIN — Medication 3 MG: at 21:08

## 2023-06-23 RX ADMIN — MONTELUKAST 10 MG: 10 TABLET, FILM COATED ORAL at 21:08

## 2023-06-23 RX ADMIN — CARVEDILOL 12.5 MG: 12.5 TABLET, FILM COATED ORAL at 21:08

## 2023-06-23 RX ADMIN — ENOXAPARIN SODIUM 40 MG: 100 INJECTION SUBCUTANEOUS at 08:38

## 2023-06-23 RX ADMIN — ASPIRIN 81 MG: 81 TABLET, COATED ORAL at 08:38

## 2023-06-23 RX ADMIN — TIOTROPIUM BROMIDE AND OLODATEROL 2 PUFF: 3.124; 2.736 SPRAY, METERED RESPIRATORY (INHALATION) at 08:04

## 2023-06-23 RX ADMIN — PERFLUTREN 1.5 ML: 6.52 INJECTION, SUSPENSION INTRAVENOUS at 14:29

## 2023-06-23 RX ADMIN — ALBUTEROL SULFATE 2 PUFF: 90 AEROSOL, METERED RESPIRATORY (INHALATION) at 17:19

## 2023-06-23 RX ADMIN — SODIUM CHLORIDE, PRESERVATIVE FREE 10 ML: 5 INJECTION INTRAVENOUS at 21:09

## 2023-06-23 RX ADMIN — ALBUTEROL SULFATE 2 PUFF: 90 AEROSOL, METERED RESPIRATORY (INHALATION) at 08:04

## 2023-06-23 RX ADMIN — SODIUM CHLORIDE, PRESERVATIVE FREE 10 ML: 5 INJECTION INTRAVENOUS at 08:39

## 2023-06-23 NOTE — ACP (ADVANCE CARE PLANNING)
Advance Care Planning     Advance Care Planning Clinical Specialist  Conversation Note      Date of ACP Conversation: 6/23/2023    [facility] Conducted with: Patient with Decision Making Capacity    ACP Clinical Specialist: Christianne Menjivar RN    Healthcare Decision Maker:     Current Designated Healthcare Decision Maker:     Primary Decision Maker: Holland Yanez - [phone]    Secondary Decision Maker: Ronni Rhodes - [phone]    Care Preferences    Hospitalization: \"If your health worsens and it becomes clear that your chance of recovery is unlikely, what would your preference be regarding hospitalization? \"    Choice:  [x] The patient wants hospitalization. [] The patient prefers comfort-focused treatment without hospitalization. Ventilation: \"If you were in your present state of health and suddenly became very ill and were unable to breathe on your own, what would your preference be about the use of a ventilator (breathing machine) if it were available to you? \"      If the patient would desire the use of ventilator (breathing machine), answer \"yes\". If not, \"no\": no    \"If your health worsens and it becomes clear that your chance of recovery is unlikely, what would your preference be about the use of a ventilator (breathing machine) if it were available to you? \"     Would the patient desire the use of ventilator (breathing machine)?: No      Resuscitation  \"CPR works best to restart the heart when there is a sudden event, like a heart attack, in someone who is otherwise healthy. Unfortunately, CPR does not typically restart the heart for people who have serious health conditions or who are very sick. \"    \"In the event your heart stopped as a result of an underlying serious health condition, would you want attempts to be made to restart your heart (answer \"yes\" for attempt to resuscitate) or would you prefer a natural death (answer \"no\" for do not attempt to resuscitate)? \" no

## 2023-06-24 VITALS
OXYGEN SATURATION: 97 % | HEIGHT: 65 IN | WEIGHT: 160.5 LBS | HEART RATE: 57 BPM | BODY MASS INDEX: 26.74 KG/M2 | RESPIRATION RATE: 18 BRPM | TEMPERATURE: 97.4 F | SYSTOLIC BLOOD PRESSURE: 117 MMHG | DIASTOLIC BLOOD PRESSURE: 65 MMHG

## 2023-06-24 PROCEDURE — 94761 N-INVAS EAR/PLS OXIMETRY MLT: CPT

## 2023-06-24 PROCEDURE — 6360000002 HC RX W HCPCS: Performed by: INTERNAL MEDICINE

## 2023-06-24 PROCEDURE — 2580000003 HC RX 258: Performed by: INTERNAL MEDICINE

## 2023-06-24 PROCEDURE — 6370000000 HC RX 637 (ALT 250 FOR IP): Performed by: INTERNAL MEDICINE

## 2023-06-24 PROCEDURE — 94640 AIRWAY INHALATION TREATMENT: CPT

## 2023-06-24 RX ORDER — NITROGLYCERIN 0.4 MG/1
0.4 TABLET SUBLINGUAL EVERY 5 MIN PRN
Qty: 25 TABLET | Refills: 3 | Status: SHIPPED | OUTPATIENT
Start: 2023-06-24

## 2023-06-24 RX ADMIN — CARVEDILOL 12.5 MG: 12.5 TABLET, FILM COATED ORAL at 08:54

## 2023-06-24 RX ADMIN — ENOXAPARIN SODIUM 40 MG: 100 INJECTION SUBCUTANEOUS at 08:54

## 2023-06-24 RX ADMIN — SODIUM CHLORIDE, PRESERVATIVE FREE 10 ML: 5 INJECTION INTRAVENOUS at 08:55

## 2023-06-24 RX ADMIN — ASPIRIN 81 MG: 81 TABLET, COATED ORAL at 08:54

## 2023-06-24 RX ADMIN — TIOTROPIUM BROMIDE AND OLODATEROL 2 PUFF: 3.124; 2.736 SPRAY, METERED RESPIRATORY (INHALATION) at 07:31

## 2023-06-24 RX ADMIN — Medication 2000 UNITS: at 08:54

## 2023-06-24 NOTE — DISCHARGE SUMMARY
[address] [facility]                               DISCHARGE SUMMARY    PATIENT NAME: Cesar Curtis               :        1945  MED REC NO:   [id number]                          ROOM:       [id number]  ACCOUNT NO:   [de-identified]                           ADMIT DATE: 2023  PROVIDER:     Ghanshyam Reynoso MD                  DISCHARGE DATE:  2023    Patient Dr. De Jesus Spore:  1. Chest pain of uncertain etiology. 2.  Coronary artery disease. 3.  Hypertension. 4.  COPD.  5.  Bradycardia. HISTORY:  The patient was a generally healthy 69-year-old white   retired female, who lived in her own home. She came to the emergency  room because of left anterior chest pain, shortness of breath, and  anxiety. Emergency room at evaluation including troponin, EKG and chest  x-ray as well as a CBC and chemistry profile were negative for acute  myocardial damage, however, in view of her past history of known  coronary artery disease and previous coronary artery stenting, admission  was felt necessary. I was contacted and agreed to admit the patient to  [facility] on telemetry. APPROPRIATE STUDIES:  Lexiscan stress test and Myoview scan were  negative for coronary ischemia. TTE showed normal left ventricular  function, grade 1 diastolic dysfunction, some mild tricuspid  insufficiency. Repeat troponin was normal.  The patient did have an  episode of flushing and shortness of breath with the Lexiscan injection  that was managed with intravenous aminophylline. The patient  subsequently had no further chest pain. Continuous cardiac monitoring  was normal, except for medication-related bradycardia and the 50s. No  further invasive testing was suggested by Cardiology consultation. Consequently, the patient was discharged home in improved condition.     DISCHARGE MEDICATIONS:  Per the [facility]

## 2023-06-24 NOTE — PLAN OF CARE
Problem: Discharge Planning  Goal: Discharge to home or other facility with appropriate resources  6/22/2023 1648 by Chandler Jovel RN  Outcome: Progressing  6/22/2023 0441 by Antionette Bernabe RN  Outcome: Progressing     Problem: Pain  Goal: Verbalizes/displays adequate comfort level or baseline comfort level  6/22/2023 1648 by Chandler Jovel RN  Outcome: Progressing  6/22/2023 0441 by Antionette Bernabe RN  Outcome: Progressing     Problem: Respiratory - Adult  Goal: Achieves optimal ventilation and oxygenation  6/22/2023 1648 by Chandler Jovel RN  Outcome: Progressing  6/22/2023 0441 by Antionette Bernabe RN  Outcome: Progressing     Problem: Cardiovascular - Adult  Goal: Maintains optimal cardiac output and hemodynamic stability  6/22/2023 1648 by Chandler Jovel RN  Outcome: Progressing  6/22/2023 0441 by Antionette Bernabe RN  Outcome: Progressing  Goal: Absence of cardiac dysrhythmias or at baseline  6/22/2023 1648 by Chandler Jovel RN  Outcome: Progressing  6/22/2023 0441 by Antionette Bernabe RN  Outcome: Progressing     Problem: Skin/Tissue Integrity - Adult  Goal: Skin integrity remains intact  6/22/2023 1648 by Chandler Jovel RN  Outcome: Progressing  6/22/2023 0441 by Antionette Bernabe RN  Outcome: Progressing     Problem: Musculoskeletal - Adult  Goal: Return mobility to safest level of function  6/22/2023 1648 by Chandler Jovel RN  Outcome: Progressing  6/22/2023 0441 by Antionette Brenabe RN  Outcome: Progressing  Goal: Maintain proper alignment of affected body part  6/22/2023 1648 by Chandler Jovel RN  Outcome: Progressing  6/22/2023 0441 by Antionette Bernabe RN  Outcome: Progressing  Goal: Return ADL status to a safe level of function  6/22/2023 1648 by Chandler Jovel RN  Outcome: Progressing  6/22/2023 0441 by Antionette Bernabe RN  Outcome: Progressing     Problem: Infection - Adult  Goal: Absence of infection at discharge  6/22/2023 1648 by Chandler Jovel RN  Outcome: Progressing  6/22/2023 0441 by
Problem: Discharge Planning  Goal: Discharge to home or other facility with appropriate resources  6/23/2023 0356 by Venkat Cloud RN  Outcome: Progressing  6/22/2023 1648 by Narcisa Penaloza RN  Outcome: Progressing     Problem: Pain  Goal: Verbalizes/displays adequate comfort level or baseline comfort level  6/23/2023 0356 by Venkat Cloud RN  Outcome: Progressing  6/22/2023 1648 by Narcisa Penaloza RN  Outcome: Progressing     Problem: Respiratory - Adult  Goal: Achieves optimal ventilation and oxygenation  6/23/2023 0356 by Venkat Cloud RN  Outcome: Progressing  6/22/2023 1648 by Narcisa Penaloza RN  Outcome: Progressing     Problem: Cardiovascular - Adult  Goal: Maintains optimal cardiac output and hemodynamic stability  6/23/2023 0356 by Venkat Colud RN  Outcome: Progressing  6/22/2023 1648 by Narcisa Penaloza RN  Outcome: Progressing  Goal: Absence of cardiac dysrhythmias or at baseline  6/23/2023 0356 by Venkat Cloud RN  Outcome: Progressing  6/22/2023 1648 by Narcisa Penaloza RN  Outcome: Progressing     Problem: Skin/Tissue Integrity - Adult  Goal: Skin integrity remains intact  6/23/2023 0356 by Venkat Cloud RN  Outcome: Progressing  6/22/2023 1648 by Narcisa Penaloza RN  Outcome: Progressing     Problem: Musculoskeletal - Adult  Goal: Return mobility to safest level of function  6/23/2023 0356 by Venkat Cloud RN  Outcome: Progressing  6/22/2023 1648 by Narcisa Penaloza RN  Outcome: Progressing  Goal: Maintain proper alignment of affected body part  6/23/2023 0356 by Venkat Cloud RN  Outcome: Progressing  6/22/2023 1648 by Narcisa Penaloza RN  Outcome: Progressing  Goal: Return ADL status to a safe level of function  6/23/2023 0356 by Venkat Cloud RN  Outcome: Progressing  6/22/2023 1648 by Narcisa Penaloza RN  Outcome: Progressing     Problem: Infection - Adult  Goal: Absence of infection at discharge  6/23/2023 0356 by Venkat Cloud RN  Outcome: Progressing  6/22/2023 1648 by Maira Corona
Problem: Discharge Planning  Goal: Discharge to home or other facility with appropriate resources  6/23/2023 1223 by Sravanthi Holm RN  Outcome: Progressing  6/23/2023 0356 by Alonso Lassiter RN  Outcome: Progressing     Problem: Pain  Goal: Verbalizes/displays adequate comfort level or baseline comfort level  6/23/2023 1223 by Sravanthi Holm RN  Outcome: Progressing  6/23/2023 0356 by Alonso Lassiter RN  Outcome: Progressing     Problem: Respiratory - Adult  Goal: Achieves optimal ventilation and oxygenation  6/23/2023 1223 by Sravanthi Holm RN  Outcome: Progressing  6/23/2023 0356 by Alonso Lassiter RN  Outcome: Progressing     Problem: Cardiovascular - Adult  Goal: Maintains optimal cardiac output and hemodynamic stability  6/23/2023 1223 by Sravanthi Holm RN  Outcome: Progressing  6/23/2023 0356 by Alonso Lassiter RN  Outcome: Progressing  Goal: Absence of cardiac dysrhythmias or at baseline  6/23/2023 1223 by Sravanthi Holm RN  Outcome: Progressing  6/23/2023 0356 by Alonso Lassiter RN  Outcome: Progressing     Problem: Skin/Tissue Integrity - Adult  Goal: Skin integrity remains intact  6/23/2023 1223 by Sravanthi Holm RN  Outcome: Progressing  6/23/2023 0356 by Alonso Lassiter RN  Outcome: Progressing     Problem: Musculoskeletal - Adult  Goal: Return mobility to safest level of function  6/23/2023 1223 by Sravanthi Holm RN  Outcome: Progressing  6/23/2023 0356 by Alonso Lassiter RN  Outcome: Progressing  Goal: Maintain proper alignment of affected body part  6/23/2023 1223 by Sravanthi Holm RN  Outcome: Progressing  6/23/2023 0356 by Alonso Lassiter RN  Outcome: Progressing  Goal: Return ADL status to a safe level of function  6/23/2023 1223 by Sravanthi Holm RN  Outcome: Progressing  6/23/2023 0356 by Alonso Lassiter RN  Outcome: Progressing     Problem: Infection - Adult  Goal: Absence of infection at discharge  6/23/2023 1223 by Sravanthi Holm RN  Outcome: Progressing  6/23/2023 0356 by Alonso Lassiter RN  Outcome:
Problem: Discharge Planning  Goal: Discharge to home or other facility with appropriate resources  Outcome: Progressing     Problem: Pain  Goal: Verbalizes/displays adequate comfort level or baseline comfort level  Outcome: Progressing     Problem: Respiratory - Adult  Goal: Achieves optimal ventilation and oxygenation  Outcome: Progressing     Problem: Cardiovascular - Adult  Goal: Maintains optimal cardiac output and hemodynamic stability  Outcome: Progressing  Goal: Absence of cardiac dysrhythmias or at baseline  Outcome: Progressing     Problem: Skin/Tissue Integrity - Adult  Goal: Skin integrity remains intact  Outcome: Progressing     Problem: Musculoskeletal - Adult  Goal: Return mobility to safest level of function  Outcome: Progressing  Goal: Maintain proper alignment of affected body part  Outcome: Progressing  Goal: Return ADL status to a safe level of function  Outcome: Progressing     Problem: Infection - Adult  Goal: Absence of infection at discharge  Outcome: Progressing  Goal: Absence of infection during hospitalization  Outcome: Progressing  Goal: Absence of fever/infection during anticipated neutropenic period  Outcome: Progressing     Problem: Metabolic/Fluid and Electrolytes - Adult  Goal: Electrolytes maintained within normal limits  Outcome: Progressing  Goal: Hemodynamic stability and optimal renal function maintained  Outcome: Progressing  Goal: Glucose maintained within prescribed range  Outcome: Progressing
Problem: Discharge Planning  Goal: Discharge to home or other facility with appropriate resources  Outcome: Progressing   Continuing to work with patient and health care team on discharge plan. Discharge instructions and medication management will be reviewed prior to discharge. Problem: Pain  Goal: Verbalizes/displays adequate comfort level or baseline comfort level  Outcome: Progressing   Pt able to express presence/absence of pain and rate pain appropriately using numerical scale. Pain/discomfort being managed with PRN analgesics per MD orders (see MAR). Pain assessed every shift and after interventions. Problem: Respiratory - Adult  Goal: Achieves optimal ventilation and oxygenation  Outcome: Progressing     Problem: Cardiovascular - Adult  Goal: Maintains optimal cardiac output and hemodynamic stability  Outcome: Progressing  Goal: Absence of cardiac dysrhythmias or at baseline  Outcome: Progressing     Problem: Skin/Tissue Integrity - Adult  Goal: Skin integrity remains intact  Outcome: Progressing   Skin assessment performed each shift per protocol. Patient turned and repositioned every two hours and prn with pillow support. Patient checked for incontence every two hours.        Problem: Musculoskeletal - Adult  Goal: Return mobility to safest level of function  Outcome: Progressing  Goal: Maintain proper alignment of affected body part  Outcome: Progressing  Goal: Return ADL status to a safe level of function  Outcome: Progressing     Problem: Infection - Adult  Goal: Absence of infection at discharge  Outcome: Progressing  Goal: Absence of infection during hospitalization  Outcome: Progressing  Goal: Absence of fever/infection during anticipated neutropenic period  Outcome: Progressing     Problem: Metabolic/Fluid and Electrolytes - Adult  Goal: Electrolytes maintained within normal limits  Outcome: Progressing  Goal: Hemodynamic stability and optimal renal function maintained  Outcome:
RN  Outcome: Progressing  Flowsheets (Taken 6/23/2023 2107)  Absence of cardiac dysrhythmias or at baseline: Monitor cardiac rate and rhythm     Problem: Skin/Tissue Integrity - Adult  Goal: Skin integrity remains intact  6/24/2023 1051 by Aggie Gao RN  Outcome: Progressing  Flowsheets  Taken 6/24/2023 1050 by Aggie Gao RN  Skin Integrity Remains Intact: Monitor for areas of redness and/or skin breakdown  Taken 6/24/2023 0900 by Aggie Gao RN  Skin Integrity Remains Intact: Monitor for areas of redness and/or skin breakdown  Taken 6/24/2023 0254 by Rehan Mora RN  Skin Integrity Remains Intact: Monitor for areas of redness and/or skin breakdown  6/24/2023 0251 by Rehan Mora RN  Outcome: Progressing  Flowsheets (Taken 6/23/2023 2107)  Skin Integrity Remains Intact: Monitor for areas of redness and/or skin breakdown     Problem: Musculoskeletal - Adult  Goal: Return mobility to safest level of function  6/24/2023 1051 by Aggie Gao RN  Outcome: Progressing  Flowsheets (Taken 6/24/2023 0900)  Return Mobility to Safest Level of Function: Assess patient stability and activity tolerance for standing, transferring and ambulating with or without assistive devices  6/24/2023 0251 by Rehan Mora RN  Outcome: Progressing  Flowsheets (Taken 6/23/2023 2107)  Return Mobility to Safest Level of Function: Assess patient stability and activity tolerance for standing, transferring and ambulating with or without assistive devices  Goal: Maintain proper alignment of affected body part  6/24/2023 1051 by Aggie Gao RN  Outcome: Progressing  6/24/2023 0251 by Rehan Mora RN  Outcome: Progressing  Flowsheets (Taken 6/23/2023 2107)  Maintain proper alignment of affected body part: Support and protect limb and body alignment per provider's orders  Goal: Return ADL status to a safe level of function  6/24/2023 1051 by Aggie Gao RN  Outcome: Progressing  Flowsheets (Taken 6/24/2023 0900)  Return ADL

## 2023-06-24 NOTE — PROGRESS NOTES
Patient admitted to room 4117 from ED. A &O x4 on RA. Patient placed on tele. Vitals stable. RN will continue to monitor.
Pharmacy Medication Reconciliation Note     List of medications patient is currently taking is complete. Source of information:   1. Conversation with patient   2. EMR    Notes regarding home medications:   1. Patient had some of her morning home medication doses today before presenting to the ER.       [id number] [facility] Hilario Rao  6/21/2023 3:05 PM
Pt AOX4 - pt denied n/v, sob, diarrhea - pt c/o 3/10 pain - pt denied any further needs at this time - bed in lowest and locked position with call light and bed side table within reach non skid socks on
Pt Off unit to Echo.  Electronically signed by Denney Kussmaul, RN on 6/23/2023 at 2:18 PM
Removed pt IV and applied a dry dressing without complications. Reviewed discharge instructions and answered all question. Pt gathered belongings and was taken down in wheelchair for discharge home.
Wes Dunn is a 69 y.o. female patient.     Current Facility-Administered Medications   Medication Dose Route Frequency Provider Last Rate Last Admin    sodium chloride flush 0.9 % injection 5-40 mL  5-40 mL IntraVENous 2 times per day Jurgen Bashir MD        sodium chloride flush 0.9 % injection 5-40 mL  5-40 mL IntraVENous PRN Jurgen Bashir MD        0.9 % sodium chloride infusion   IntraVENous PRN Jurgen Bashir MD        enoxaparin (LOVENOX) injection 40 mg  40 mg SubCUTAneous Daily Jurgen Bashir MD        ondansetron (ZOFRAN-ODT) disintegrating tablet 4 mg  4 mg Oral Q8H PRN Jurgen Bashir MD        Or    ondansetron [facility]) injection 4 mg  4 mg IntraVENous Q6H PRN Jurgen Bashir MD        polyethylene glycol (GLYCOLAX) packet 17 g  17 g Oral Daily PRN Jurgen Bashir MD        acetaminophen (TYLENOL) tablet 650 mg  650 mg Oral Q6H PRN Jurgen Bashir MD        Or    acetaminophen (TYLENOL) suppository 650 mg  650 mg Rectal Q6H PRN Jurgen Bashir MD        albuterol sulfate HFA (PROVENTIL;VENTOLIN;PROAIR) 108 (90 Base) MCG/ACT inhaler 2 puff  2 puff Inhalation Q6H PRN Jurgen Bashir MD   2 puff at 06/23/23 0804    aspirin EC tablet 81 mg  81 mg Oral Daily Jurgen Bashir MD   81 mg at 06/23/23 7869    atorvastatin (LIPITOR) tablet 40 mg  40 mg Oral Daily Jurgen Bashir MD   40 mg at 06/22/23 2200    carvedilol (COREG) tablet 12.5 mg  12.5 mg Oral BID Jurgen Bashir MD   12.5 mg at 06/22/23 2200    Vitamin D (CHOLECALCIFEROL) tablet 2,000 Units  2,000 Units Oral Daily Jurgen Bashir MD   2,000 Units at 06/22/23 0916    montelukast (SINGULAIR) tablet 10 mg  10 mg Oral Nightly Jurgen Bashir MD   10 mg at 06/22/23 2200    tiotropium-olodaterol (STIOLTO) 2.5-2.5 MCG/ACT inhaler 2 puff  2 puff Inhalation Daily Jurgen Bashir MD   2 puff at 06/23/23 0804    sodium chloride flush 0.9 % injection 5-40 mL  5-40 mL IntraVENous 2
breastfeeding. A. Feb. VSS. A&O, CTPA, RR&R, nno edema. Stress test and scan, nl. TTE nl. For her age. Sebastian Paniagua for D/C. Instructions given. Has an appt. With me next month.   Assessment & Plan    Joaquín Henriquez MD  6/24/2023

## 2023-08-11 RX ORDER — LISINOPRIL 5 MG/1
TABLET ORAL
Qty: 90 TABLET | Refills: 3 | Status: SHIPPED | OUTPATIENT
Start: 2023-08-11

## 2023-08-11 NOTE — TELEPHONE ENCOUNTER
Last OV: 05/04/23  Next OV: NONE  Last refill: 7/6/2023  Most recent Labs: lipid p , CMP , TSH ,CBC 08/04/23  Last EKG (if needed): 06/21/23

## 2023-12-07 DIAGNOSIS — I25.10 ATHEROSCLEROSIS OF NATIVE CORONARY ARTERY OF NATIVE HEART WITHOUT ANGINA PECTORIS: Chronic | ICD-10-CM

## 2023-12-07 DIAGNOSIS — E78.2 MIXED HYPERLIPIDEMIA: Chronic | ICD-10-CM

## 2023-12-12 RX ORDER — ATORVASTATIN CALCIUM 40 MG/1
TABLET, FILM COATED ORAL
Qty: 90 TABLET | Refills: 0 | Status: SHIPPED | OUTPATIENT
Start: 2023-12-12

## 2023-12-12 NOTE — TELEPHONE ENCOUNTER
Last OV: 5/4/23  Next OV: X due in 9 months  Last refill: 12/12/22  #90 3 R/F  Most recent Labs: 8/4/23  Last EKG (if needed): 6/21/23

## 2024-01-03 ENCOUNTER — OFFICE VISIT (OUTPATIENT)
Dept: PULMONOLOGY | Age: 79
End: 2024-01-03
Payer: MEDICARE

## 2024-01-03 VITALS
SYSTOLIC BLOOD PRESSURE: 124 MMHG | TEMPERATURE: 97 F | HEART RATE: 56 BPM | OXYGEN SATURATION: 93 % | HEIGHT: 65 IN | WEIGHT: 158 LBS | BODY MASS INDEX: 26.33 KG/M2 | RESPIRATION RATE: 16 BRPM | DIASTOLIC BLOOD PRESSURE: 80 MMHG

## 2024-01-03 DIAGNOSIS — J43.2 CENTRILOBULAR EMPHYSEMA (HCC): Primary | ICD-10-CM

## 2024-01-03 DIAGNOSIS — J44.9 MODERATE COPD (CHRONIC OBSTRUCTIVE PULMONARY DISEASE) (HCC): ICD-10-CM

## 2024-01-03 PROCEDURE — 1123F ACP DISCUSS/DSCN MKR DOCD: CPT | Performed by: INTERNAL MEDICINE

## 2024-01-03 PROCEDURE — G8427 DOCREV CUR MEDS BY ELIG CLIN: HCPCS | Performed by: INTERNAL MEDICINE

## 2024-01-03 PROCEDURE — G8484 FLU IMMUNIZE NO ADMIN: HCPCS | Performed by: INTERNAL MEDICINE

## 2024-01-03 PROCEDURE — G8400 PT W/DXA NO RESULTS DOC: HCPCS | Performed by: INTERNAL MEDICINE

## 2024-01-03 PROCEDURE — G8417 CALC BMI ABV UP PARAM F/U: HCPCS | Performed by: INTERNAL MEDICINE

## 2024-01-03 PROCEDURE — 1036F TOBACCO NON-USER: CPT | Performed by: INTERNAL MEDICINE

## 2024-01-03 PROCEDURE — 99213 OFFICE O/P EST LOW 20 MIN: CPT | Performed by: INTERNAL MEDICINE

## 2024-01-03 PROCEDURE — 3074F SYST BP LT 130 MM HG: CPT | Performed by: INTERNAL MEDICINE

## 2024-01-03 PROCEDURE — 3023F SPIROM DOC REV: CPT | Performed by: INTERNAL MEDICINE

## 2024-01-03 PROCEDURE — 1090F PRES/ABSN URINE INCON ASSESS: CPT | Performed by: INTERNAL MEDICINE

## 2024-01-03 PROCEDURE — 3079F DIAST BP 80-89 MM HG: CPT | Performed by: INTERNAL MEDICINE

## 2024-01-03 RX ORDER — UMECLIDINIUM BROMIDE AND VILANTEROL TRIFENATATE 62.5; 25 UG/1; UG/1
1 POWDER RESPIRATORY (INHALATION) DAILY
Qty: 1 EACH | Refills: 11 | Status: SHIPPED | OUTPATIENT
Start: 2024-01-03

## 2024-01-03 RX ORDER — ALBUTEROL SULFATE 90 UG/1
2 AEROSOL, METERED RESPIRATORY (INHALATION) EVERY 6 HOURS PRN
Qty: 1 EACH | Refills: 2 | Status: SHIPPED | OUTPATIENT
Start: 2024-01-03

## 2024-01-03 ASSESSMENT — ENCOUNTER SYMPTOMS: RESPIRATORY NEGATIVE: 1

## 2024-01-03 ASSESSMENT — COPD QUESTIONNAIRES: COPD: 1

## 2024-01-03 NOTE — PROGRESS NOTES
[facility] PULMONARY, SLEEP, AND CRITICAL CARE   Rosa sIela Schreiber (:  1945) is a 78 y.o. female,Established patient, here for evaluation of the following chief complaint(s):  COPD and Follow-up         ASSESSMENT/PLAN:  1. Centrilobular emphysema (HCC)  -     umeclidinium-vilanterol (ANORO ELLIPTA) 62.5-25 MCG/ACT inhaler; Inhale 1 puff into the lungs daily, Disp-1 each, R-11Normal  -     albuterol sulfate HFA (PROVENTIL;VENTOLIN;PROAIR) 108 (90 Base) MCG/ACT inhaler; Inhale 2 puffs into the lungs every 6 hours as needed for Wheezing, Disp-1 each, R-2Normal  2. Moderate COPD (chronic obstructive pulmonary disease) (HCC)  -     umeclidinium-vilanterol (ANORO ELLIPTA) 62.5-25 MCG/ACT inhaler; Inhale 1 puff into the lungs daily, Disp-1 each, R-11Normal  -     albuterol sulfate HFA (PROVENTIL;VENTOLIN;PROAIR) 108 (90 Base) MCG/ACT inhaler; Inhale 2 puffs into the lungs every 6 hours as needed for Wheezing, Disp-1 each, R-2Normal        Return in about 1 year (around 1/3/2025).       Future Appointments   Date Time Provider Department Center   2025  2:20 PM Sunday Parry MD  PULM [facility]         Subjective   SUBJECTIVE/OBJECTIVE:  Since last visit no hospitalizations or exacerbations.  -Continues to do well on Anoro.  Uses albuterol very infrequently  -Former smoker    COPD        Review of Systems   Constitutional: Negative.    Respiratory: Negative.     Cardiovascular: Negative.    Musculoskeletal: Negative.    Neurological: Negative.           Objective   Physical Exam    Gen:  No acute distress.   Eyes: PERRL. EOMI. Anicteric sclera. No conjunctival injection.     Resp:  No crackles. No wheezes. No rhonchi. No dullness on percussion.  CV: Regular rate. Regular rhythm. No murmur or rub. No edema.     Neuro:  no focal neurologic deficit.  Moves all extremities  Psych: Awake and alert, Oriented x 3.  Judgement and insight appropriate.  Mood stable.      I spent 22 minutes on this case today,

## 2024-02-08 ENCOUNTER — OFFICE VISIT (OUTPATIENT)
Dept: CARDIOLOGY CLINIC | Age: 79
End: 2024-02-08
Payer: MEDICARE

## 2024-02-08 VITALS
DIASTOLIC BLOOD PRESSURE: 78 MMHG | WEIGHT: 157 LBS | HEART RATE: 56 BPM | HEIGHT: 65 IN | BODY MASS INDEX: 26.16 KG/M2 | OXYGEN SATURATION: 98 % | SYSTOLIC BLOOD PRESSURE: 136 MMHG

## 2024-02-08 DIAGNOSIS — E87.5 HYPERKALEMIA: ICD-10-CM

## 2024-02-08 DIAGNOSIS — I77.1 BRACHIOCEPHALIC ARTERY STENOSIS, RIGHT (HCC): ICD-10-CM

## 2024-02-08 DIAGNOSIS — E78.5 HYPERLIPIDEMIA LDL GOAL <70: ICD-10-CM

## 2024-02-08 DIAGNOSIS — Z87.891 FORMER SMOKER: ICD-10-CM

## 2024-02-08 DIAGNOSIS — R00.1 BRADYCARDIA: ICD-10-CM

## 2024-02-08 DIAGNOSIS — I25.10 CORONARY ARTERY DISEASE INVOLVING NATIVE CORONARY ARTERY OF NATIVE HEART WITHOUT ANGINA PECTORIS: Primary | ICD-10-CM

## 2024-02-08 DIAGNOSIS — I10 ESSENTIAL HYPERTENSION: ICD-10-CM

## 2024-02-08 DIAGNOSIS — R09.89 BILATERAL CAROTID BRUITS: ICD-10-CM

## 2024-02-08 PROCEDURE — G8484 FLU IMMUNIZE NO ADMIN: HCPCS | Performed by: INTERNAL MEDICINE

## 2024-02-08 PROCEDURE — 3078F DIAST BP <80 MM HG: CPT | Performed by: INTERNAL MEDICINE

## 2024-02-08 PROCEDURE — G8427 DOCREV CUR MEDS BY ELIG CLIN: HCPCS | Performed by: INTERNAL MEDICINE

## 2024-02-08 PROCEDURE — 1036F TOBACCO NON-USER: CPT | Performed by: INTERNAL MEDICINE

## 2024-02-08 PROCEDURE — G8400 PT W/DXA NO RESULTS DOC: HCPCS | Performed by: INTERNAL MEDICINE

## 2024-02-08 PROCEDURE — 99214 OFFICE O/P EST MOD 30 MIN: CPT | Performed by: INTERNAL MEDICINE

## 2024-02-08 PROCEDURE — 1123F ACP DISCUSS/DSCN MKR DOCD: CPT | Performed by: INTERNAL MEDICINE

## 2024-02-08 PROCEDURE — 1090F PRES/ABSN URINE INCON ASSESS: CPT | Performed by: INTERNAL MEDICINE

## 2024-02-08 PROCEDURE — 93000 ELECTROCARDIOGRAM COMPLETE: CPT | Performed by: INTERNAL MEDICINE

## 2024-02-08 PROCEDURE — G8417 CALC BMI ABV UP PARAM F/U: HCPCS | Performed by: INTERNAL MEDICINE

## 2024-02-08 PROCEDURE — 3075F SYST BP GE 130 - 139MM HG: CPT | Performed by: INTERNAL MEDICINE

## 2024-02-08 NOTE — PROGRESS NOTES
[facility]      Cardiology Progress Note     Rosa Isela Schreiber  1945 February 8, 2024    CC: \"I     HPI:  The patient is 78 y.o. female with a past medical history significant for COPD/emphysema, CAD, CMP, HTN, HLD presents per Dr. Mathis as a second opinion for ongoing off/on SOB and dizziness. Primary Cardiologist Dr. MARIA LUISA Nunez and seen in the [facility] 6/1/20 for left sided chest pain x2 weeks, most notiable in bed with some exertional component. As reported, she has chronic mild CONNOR and was unchanged at that time. She had also reported increase in life stressors with a recent move. Stress study was completed on 6/2/20 which was abnormal. She was set up for a [facility] 6/9/20 revealing 80% proximal LAD stenosis s/p Xience KYM near ostial LAD with residual disease per Dr Naty Russell. She presents today per Dr. Mathis for further assessment of SOB and dizziness that occurs off/on.     5/4/23 She reports she remains symptom free since our last visit. 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  Reports dizziness last summer and believed it to be plavix, went off and resolved.     Stress and echo completed 6/2023.    Today she is here for her routine follow-up  She had upper respiratory tract infection in January which has almost resolved now.  She denies any chest pain tightness pressure or palpitation        Past Medical History:   Diagnosis Date    Bruising     CAD (coronary artery disease)     Cardiomyopathy (HCC)     Emphysema     Hyperlipidemia     Hypertension     Myocardial infarct (HCC)      Past Surgical History:   Procedure Laterality Date    APPENDECTOMY      BREAST BIOPSY      CARDIAC SURGERY  2009    RCA    CHOLECYSTECTOMY      COLONOSCOPY      HYSTEROSCOPY  09/09/2016    HYSTEROSCOPY DILATATION AND CURETTAGE WITH MYOSURE    SUBCLAVIAN BYPASS

## 2024-02-12 RX ORDER — LISINOPRIL 5 MG/1
10 TABLET ORAL DAILY
Qty: 180 TABLET | Refills: 3 | Status: SHIPPED | OUTPATIENT
Start: 2024-02-12

## 2024-03-09 DIAGNOSIS — E78.2 MIXED HYPERLIPIDEMIA: Chronic | ICD-10-CM

## 2024-03-09 DIAGNOSIS — I25.10 ATHEROSCLEROSIS OF NATIVE CORONARY ARTERY OF NATIVE HEART WITHOUT ANGINA PECTORIS: Chronic | ICD-10-CM

## 2024-03-11 RX ORDER — ATORVASTATIN CALCIUM 40 MG/1
40 TABLET, FILM COATED ORAL DAILY
Qty: 90 TABLET | Refills: 3 | Status: SHIPPED | OUTPATIENT
Start: 2024-03-11

## 2024-05-04 ENCOUNTER — OFFICE VISIT (OUTPATIENT)
Age: 79
End: 2024-05-04

## 2024-05-04 VITALS
SYSTOLIC BLOOD PRESSURE: 170 MMHG | HEART RATE: 59 BPM | RESPIRATION RATE: 16 BRPM | TEMPERATURE: 97.5 F | OXYGEN SATURATION: 98 % | DIASTOLIC BLOOD PRESSURE: 84 MMHG

## 2024-05-04 DIAGNOSIS — S51.801A AVULSION OF SKIN OF RIGHT FOREARM, INITIAL ENCOUNTER: Primary | ICD-10-CM

## 2024-05-04 NOTE — PROGRESS NOTES
Rosa Isela Schreiber (:  1945) is a 78 y.o. female, here for evaluation of the following chief complaint(s):  Arm Pain (Pt c/o right arm pain, pt states her arm got caught in a door 2 days ago noted bruising and tear in the skin)      ASSESSMENT/PLAN:  Visit Diagnoses and Associated Orders       Avulsion of skin of right forearm, initial encounter    -  Primary         ORDERS WITHOUT AN ASSOCIATED DIAGNOSIS    mupirocin (BACTROBAN) 2 % ointment [21980]      Tdap, BOOSTRIX, (age 10 yrs+), IM [37909 [employer]]             Summary    - Offered oral antibiotics but also told her that the wound healing process would likely only need topical antibiotic ointment at this stage as there are no signs of current infection.       SUBJECTIVE/OBJECTIVE:  STEFAN Natarajan presents to the clinic with a wound which occurred yesterday while at a volunteer center where a door closed on her arm.  She is not diabetic but does take aspirin daily.  She says it has some yellow discharge and was bleeding at onset but the bleeding has stopped.  She wants to make sure it is not infected.      Vitals:    24 0838   BP: (!) 170/84   Pulse: 59   Resp: 16   Temp: 97.5 °F (36.4 °C)   SpO2: 98%       No results found for this visit on 24.     No orders to display       Review of Systems      Physical Exam  Vitals reviewed.   Constitutional:       Appearance: She is normal weight.   HENT:      Head: Normocephalic.      Mouth/Throat:      Mouth: Mucous membranes are moist.   Eyes:      Pupils: Pupils are equal, round, and reactive to light.   Pulmonary:      Effort: Pulmonary effort is normal.   Abdominal:      Tenderness: There is no guarding.   Musculoskeletal:      Cervical back: Neck supple. No rigidity.   Lymphadenopathy:      Cervical: No cervical adenopathy.   Skin:     General: Skin is warm.             Comments: Right arm with 4cm long avulsion without edema, erythema, tenderness, or purulent drainage. Bedding is red.

## 2024-05-29 NOTE — TELEPHONE ENCOUNTER
Patient due for pap. Please call to schedule with me, Dr. Mendoza, Dr. Espinal or midwives for pap.    Patient no longer sees Dr Geri Chase. She is seeing Dr Malick Melendrez due to location of office.

## 2024-11-07 NOTE — PROGRESS NOTES
[facility]      Cardiology Progress Note     Rosa Isela Schreiber  1945 November 11, 2024    CC: \"Not feeling well last couple weeks\"     HPI:  The patient is 79 y.o. female with a past medical history significant for COPD/emphysema, CAD, CMP, HTN, HLD presents per Dr. Mathis as a second opinion for ongoing off/on SOB and dizziness. Primary Cardiologist Dr. MARIA LUISA Nunez and seen in the [facility] 6/1/20 for left sided chest pain x2 weeks, most notiable in bed with some exertional component. As reported, she has chronic mild CONNOR and was unchanged at that time. She had also reported increase in life stressors with a recent move. Stress study was completed on 6/2/20 which was abnormal. She was set up for a [facility] 6/9/20 revealing 80% proximal LAD stenosis s/p Xience KYM near ostial LAD with residual disease per Dr Naty Russell. She presents today per Dr. Mathis for further assessment of SOB and dizziness that occurs off/on.     5/4/23 She reports she remains symptom free since our last visit. 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  Reports dizziness last summer and believed it to be plavix, went off and resolved.     Stress and echo completed 6/2023.    Today she is here for her routine follow-up  Complaining of one time sharp chest pain that had to take nitro.  No sharp chest pain since that episode.   Chest pressure intermittently.  Continuous lower abdominal pain she is also complaining of off-and-on dizziness as well as abdominal discomfort  Past Medical History:   Diagnosis Date    Bruising     CAD (coronary artery disease)     Cardiomyopathy (HCC)     Emphysema     Hyperlipidemia     Hypertension     Myocardial infarct (HCC)      Past Surgical History:   Procedure Laterality Date    APPENDECTOMY      BREAST BIOPSY      CARDIAC SURGERY  2009    RCA

## 2024-11-11 ENCOUNTER — OFFICE VISIT (OUTPATIENT)
Dept: CARDIOLOGY CLINIC | Age: 79
End: 2024-11-11

## 2024-11-11 VITALS
HEIGHT: 65 IN | HEART RATE: 54 BPM | OXYGEN SATURATION: 99 % | SYSTOLIC BLOOD PRESSURE: 138 MMHG | BODY MASS INDEX: 25.66 KG/M2 | DIASTOLIC BLOOD PRESSURE: 80 MMHG | WEIGHT: 154 LBS

## 2024-11-11 DIAGNOSIS — E78.2 MIXED HYPERLIPIDEMIA: Primary | Chronic | ICD-10-CM

## 2024-11-11 DIAGNOSIS — E87.5 HYPERKALEMIA: ICD-10-CM

## 2024-11-11 DIAGNOSIS — R07.9 CHEST PAIN IN ADULT: ICD-10-CM

## 2024-11-11 LAB — POTASSIUM SERPL-SCNC: 4.4 MMOL/L (ref 3.5–5.1)

## 2024-11-11 RX ORDER — CARVEDILOL 12.5 MG/1
6.25 TABLET ORAL 2 TIMES DAILY
Qty: 180 TABLET | Refills: 3 | Status: SHIPPED | OUTPATIENT
Start: 2024-11-11

## 2024-11-11 NOTE — PATIENT INSTRUCTIONS
-Have Potassium checked.    -Decrease Coreg 6.25 mg twice a day     -After the potassium results come back I will decide if to stop Lisinopril

## 2024-11-30 ENCOUNTER — OFFICE VISIT (OUTPATIENT)
Age: 79
End: 2024-11-30

## 2024-11-30 VITALS
DIASTOLIC BLOOD PRESSURE: 72 MMHG | HEART RATE: 72 BPM | WEIGHT: 151.4 LBS | OXYGEN SATURATION: 96 % | TEMPERATURE: 98 F | BODY MASS INDEX: 25.19 KG/M2 | SYSTOLIC BLOOD PRESSURE: 107 MMHG

## 2024-11-30 DIAGNOSIS — J32.0 MAXILLARY SINUSITIS, UNSPECIFIED CHRONICITY: Primary | ICD-10-CM

## 2024-11-30 RX ORDER — AZITHROMYCIN 250 MG/1
250 TABLET, FILM COATED ORAL SEE ADMIN INSTRUCTIONS
Qty: 6 TABLET | Refills: 0 | Status: SHIPPED | OUTPATIENT
Start: 2024-11-30 | End: 2024-12-05

## 2024-11-30 NOTE — PROGRESS NOTES
[facility]    Rosa Isela Schreiber (:  1945 MRN: [id number]) is a 79 y.o. female, here for evaluation of the following chief complaint(s):  Sinus Problem (Pt here for sinus pressure, congestion and had a fever yesterday.)    ASSESSMENT/PLAN:    ICD-10-CM    1. Maxillary sinusitis, unspecified chronicity  J32.0           New Prescriptions    AZITHROMYCIN (ZITHROMAX) 250 MG TABLET    Take 1 tablet by mouth See Admin Instructions for 5 days 500mg on day 1 followed by 250mg on days 2 - 5     Summary  - Examination fostered sinus tenderness with palpation which is indicative of a bacterial-induced infection and therefore merits antibiotic therapy. The risks of this infection progressing without such therapy include sepsis, abscess, and various secondary infections which are considered to be probable in this case with an above average likelihood of occurrence.  - I explained the warning signs of when to go to the emergency room.   - Follow up discussed and will be on an as-needed basis.  Differentials include: Bronchitis, GERD, asthma, COVID-19, Influenza, Pneumonia, RSV, Viral Upper Respiratory Infection,    SUBJECTIVE/OBJECTIVE:  HPI  Onset for this issue was 1 week ago but got a lot worse last night. Since the onset, symptoms have been gradually worsening. Relevant symptoms include congestion, cough with colored sputum, headache, post nasal drip, sinus pressure, and sore throat.          Vitals:    24 1240   BP: 107/72   Pulse: 72   Temp: 98 °F (36.7 °C)   TempSrc: Oral   SpO2: 96%   Weight: 68.7 kg (151 lb 6.4 oz)       No results found for this visit on 24.     No orders to display     PHYSICAL EXAM  Physical Exam  Vitals and nursing note reviewed.   Constitutional:       General: She is not in acute distress.     Appearance: Normal appearance. She is not ill-appearing, toxic-appearing or diaphoretic.   HENT:      Head: Normocephalic and atraumatic.      Right Ear: Tympanic membrane

## 2025-01-08 ENCOUNTER — OFFICE VISIT (OUTPATIENT)
Dept: PULMONOLOGY | Age: 80
End: 2025-01-08
Payer: MEDICARE

## 2025-01-08 VITALS
SYSTOLIC BLOOD PRESSURE: 140 MMHG | HEART RATE: 70 BPM | RESPIRATION RATE: 16 BRPM | WEIGHT: 152.8 LBS | OXYGEN SATURATION: 98 % | DIASTOLIC BLOOD PRESSURE: 70 MMHG | TEMPERATURE: 97 F | HEIGHT: 65 IN | BODY MASS INDEX: 25.46 KG/M2

## 2025-01-08 DIAGNOSIS — J44.9 MODERATE COPD (CHRONIC OBSTRUCTIVE PULMONARY DISEASE) (HCC): ICD-10-CM

## 2025-01-08 DIAGNOSIS — J43.2 CENTRILOBULAR EMPHYSEMA (HCC): ICD-10-CM

## 2025-01-08 PROCEDURE — 1090F PRES/ABSN URINE INCON ASSESS: CPT | Performed by: INTERNAL MEDICINE

## 2025-01-08 PROCEDURE — G8417 CALC BMI ABV UP PARAM F/U: HCPCS | Performed by: INTERNAL MEDICINE

## 2025-01-08 PROCEDURE — 99213 OFFICE O/P EST LOW 20 MIN: CPT | Performed by: INTERNAL MEDICINE

## 2025-01-08 PROCEDURE — G8427 DOCREV CUR MEDS BY ELIG CLIN: HCPCS | Performed by: INTERNAL MEDICINE

## 2025-01-08 PROCEDURE — 3077F SYST BP >= 140 MM HG: CPT | Performed by: INTERNAL MEDICINE

## 2025-01-08 PROCEDURE — 1036F TOBACCO NON-USER: CPT | Performed by: INTERNAL MEDICINE

## 2025-01-08 PROCEDURE — 3023F SPIROM DOC REV: CPT | Performed by: INTERNAL MEDICINE

## 2025-01-08 PROCEDURE — 3078F DIAST BP <80 MM HG: CPT | Performed by: INTERNAL MEDICINE

## 2025-01-08 PROCEDURE — 1123F ACP DISCUSS/DSCN MKR DOCD: CPT | Performed by: INTERNAL MEDICINE

## 2025-01-08 PROCEDURE — G8400 PT W/DXA NO RESULTS DOC: HCPCS | Performed by: INTERNAL MEDICINE

## 2025-01-08 RX ORDER — AMLODIPINE BESYLATE 5 MG/1
5 TABLET ORAL DAILY
COMMUNITY
Start: 2024-12-22

## 2025-01-08 RX ORDER — UMECLIDINIUM BROMIDE AND VILANTEROL TRIFENATATE 62.5; 25 UG/1; UG/1
1 POWDER RESPIRATORY (INHALATION) DAILY
Qty: 1 EACH | Refills: 11 | Status: SHIPPED | OUTPATIENT
Start: 2025-01-08

## 2025-01-08 NOTE — PROGRESS NOTES
treatment plan.  All questions answered.     An electronic signature was used to authenticate this note.    --Sunday Parry MD

## 2025-01-13 ENCOUNTER — OFFICE VISIT (OUTPATIENT)
Dept: CARDIOLOGY CLINIC | Age: 80
End: 2025-01-13
Payer: MEDICARE

## 2025-01-13 VITALS
SYSTOLIC BLOOD PRESSURE: 136 MMHG | HEIGHT: 65 IN | WEIGHT: 152 LBS | BODY MASS INDEX: 25.33 KG/M2 | DIASTOLIC BLOOD PRESSURE: 76 MMHG | OXYGEN SATURATION: 98 % | HEART RATE: 64 BPM

## 2025-01-13 DIAGNOSIS — E78.5 HYPERLIPIDEMIA LDL GOAL <70: ICD-10-CM

## 2025-01-13 DIAGNOSIS — I77.1 BRACHIOCEPHALIC ARTERY STENOSIS, RIGHT (HCC): ICD-10-CM

## 2025-01-13 DIAGNOSIS — R00.1 BRADYCARDIA: ICD-10-CM

## 2025-01-13 DIAGNOSIS — R09.89 BILATERAL CAROTID BRUITS: ICD-10-CM

## 2025-01-13 DIAGNOSIS — I10 ESSENTIAL HYPERTENSION: ICD-10-CM

## 2025-01-13 DIAGNOSIS — I25.10 CORONARY ARTERY DISEASE INVOLVING NATIVE CORONARY ARTERY OF NATIVE HEART WITHOUT ANGINA PECTORIS: Primary | ICD-10-CM

## 2025-01-13 DIAGNOSIS — E87.5 HYPERKALEMIA: ICD-10-CM

## 2025-01-13 PROCEDURE — 1159F MED LIST DOCD IN RCRD: CPT | Performed by: INTERNAL MEDICINE

## 2025-01-13 PROCEDURE — G8417 CALC BMI ABV UP PARAM F/U: HCPCS | Performed by: INTERNAL MEDICINE

## 2025-01-13 PROCEDURE — 1123F ACP DISCUSS/DSCN MKR DOCD: CPT | Performed by: INTERNAL MEDICINE

## 2025-01-13 PROCEDURE — 99214 OFFICE O/P EST MOD 30 MIN: CPT | Performed by: INTERNAL MEDICINE

## 2025-01-13 PROCEDURE — G8427 DOCREV CUR MEDS BY ELIG CLIN: HCPCS | Performed by: INTERNAL MEDICINE

## 2025-01-13 PROCEDURE — 3075F SYST BP GE 130 - 139MM HG: CPT | Performed by: INTERNAL MEDICINE

## 2025-01-13 PROCEDURE — G8400 PT W/DXA NO RESULTS DOC: HCPCS | Performed by: INTERNAL MEDICINE

## 2025-01-13 PROCEDURE — 1090F PRES/ABSN URINE INCON ASSESS: CPT | Performed by: INTERNAL MEDICINE

## 2025-01-13 PROCEDURE — 1036F TOBACCO NON-USER: CPT | Performed by: INTERNAL MEDICINE

## 2025-01-13 PROCEDURE — 3078F DIAST BP <80 MM HG: CPT | Performed by: INTERNAL MEDICINE

## 2025-01-13 NOTE — ASSESSMENT & PLAN NOTE
- FEV1 50%  -No recent exacerbation or hospitalization  -Continue Anoro daily, albuterol as needed  -Former smoker quit greater than 20 years ago

## 2025-01-13 NOTE — PROGRESS NOTES
at distal edge and 20 SCOOTER at proximal edge  20% proximal residual      Impression/Recommendations:  KYM-proximal LAD  ASA long term  Clopidogrel loaded in lab and for 12 months  Aggrastat for 6 hours with sheath removal thereafter  Continue home medications.   Observe in CVU overnight.      Carotid Duplex:7/22/20  Right Impression    The right vertebral artery demonstrates abnormal to-fro flow.    The right subclavian artery is visualized and demonstrates monophasic flow.    possible stenosis of the brachiocephalic artery.    Left Impression    The left internal carotid artery demonstrates no significant stenosis.    The left vertebral artery demonstrates normal antegrade flow.    The left subclavian artery is visualized and demonstrates triphasic flow.    Carotid bulb appears to have homogenous plaque formation.           Echo:7/22/20  Summary  There is mild concentric left ventricular hypertrophy.  Endocardium is not well visualized, EF may be reduced   50%, however  recommend repeat study with definity  Grade I diastolic dysfunction with normal LV filling pressures.     Echo: 6/21/23  Definity contrast was administered.   Overall, left ventricular systolic function is normal.   Ejection fraction is visually estimated to be 55-60%.   No regional wall motion abnormalities are noted.   There is mild concentric left ventricular hypertrophy.   Diastolic filling parameters suggest grade I diastolic dysfunction.   Mild tricuspid regurgitation. Systolic pulmonary artery pressure (SPAP) is   normal and estimated at 32 mmHg (RA pressure 3 mmHg).    Stress test: 6/22/23   1. Technically a satisfactory study.   2. No evidence of Ischemia by Myocardial Perfusion Imaging.   3. Gated Study shows normal LV size and Systolic function; EF is 70 %.       Assessment/Plan:     CAD  Her last cardiac cath 6/2020 revealed KYM proximal LAD with residual 50% mid plaque disease LAD, proximal RCA stent with 30-40% in stent restenosis, 50%

## 2025-01-28 ENCOUNTER — TELEPHONE (OUTPATIENT)
Dept: PULMONOLOGY | Age: 80
End: 2025-01-28

## 2025-01-28 NOTE — TELEPHONE ENCOUNTER
Patient was moved into a tier 3 by her insurance. Her ANORO ELLIPTA $469.16. she cannot afford that and is asking for an appeal. Tier 2 is only $8.00 they changed it without telling her. Fax to [phone] for the appeal. Please add patients full name Rosa Isela Schreiber renettaem member di [id number] and doctors name or person filling out the appeal. Also the office phone number and the item ANORO ELLIPTA 62.5-25 MCG/ACT inhaler. They want to know why she is using this and the diagnosis code. Also if its medically necessary to lower to tier 2. This medication keeps her alive and the medication works. She was denied on 1/28/25 that was her first appeal and now the doctor needs to appeal it

## 2025-02-03 NOTE — TELEPHONE ENCOUNTER
Spoke to Patient let her know that she needs to get name of alternative inhalers to try she will call her insurance and call us back with list of alternative inhalers.

## 2025-02-03 NOTE — TELEPHONE ENCOUNTER
Pt states she called her insurance they will cover her albuterol    She will call us when they send them to her by mail

## 2025-02-07 NOTE — TELEPHONE ENCOUNTER
Called manjeet [phone]  Karl  Tier exception was denied 1-28-25  He is looking in to alternate therapy for pt     They will pay for albuterol    Pt states she AS ce AND copd AND SHE NEEDS THE ANORA  ALBUTREOL IS FOR ASTHMA PER PT    SHE WOULD LIKE YOU TO DO AN APPEAL FOR HER TO GET THIS AT THE TIER 2 PRICING

## 2025-02-14 NOTE — TELEPHONE ENCOUNTER
Called to check status    Nimisha states it is still being processed  They allow 7 days to process  We should know something by 2-17-25

## 2025-02-19 RX ORDER — LISINOPRIL 5 MG/1
10 TABLET ORAL DAILY
Qty: 180 TABLET | Refills: 3 | Status: SHIPPED | OUTPATIENT
Start: 2025-02-19

## 2025-03-06 DIAGNOSIS — E78.2 MIXED HYPERLIPIDEMIA: Chronic | ICD-10-CM

## 2025-03-06 DIAGNOSIS — I25.10 ATHEROSCLEROSIS OF NATIVE CORONARY ARTERY OF NATIVE HEART WITHOUT ANGINA PECTORIS: Chronic | ICD-10-CM

## 2025-03-06 RX ORDER — ATORVASTATIN CALCIUM 40 MG/1
40 TABLET, FILM COATED ORAL DAILY
Qty: 90 TABLET | Refills: 3 | Status: SHIPPED | OUTPATIENT
Start: 2025-03-06

## 2025-04-06 ENCOUNTER — OFFICE VISIT (OUTPATIENT)
Age: 80
End: 2025-04-06

## 2025-04-06 VITALS
DIASTOLIC BLOOD PRESSURE: 82 MMHG | HEART RATE: 57 BPM | SYSTOLIC BLOOD PRESSURE: 140 MMHG | WEIGHT: 155.8 LBS | HEIGHT: 66 IN | OXYGEN SATURATION: 96 % | TEMPERATURE: 97.6 F | BODY MASS INDEX: 25.04 KG/M2

## 2025-04-06 DIAGNOSIS — M54.9 BILATERAL BACK PAIN, UNSPECIFIED BACK LOCATION, UNSPECIFIED CHRONICITY: Primary | ICD-10-CM

## 2025-04-06 DIAGNOSIS — R31.29 OTHER MICROSCOPIC HEMATURIA: ICD-10-CM

## 2025-04-06 LAB
BILIRUBIN, POC: NEGATIVE
BLOOD URINE, POC: NORMAL
CLARITY, POC: NORMAL
COLOR, POC: YELLOW
GLUCOSE URINE, POC: NEGATIVE MG/DL
KETONES, POC: NEGATIVE MG/DL
LEUKOCYTE EST, POC: NORMAL
NITRITE, POC: NEGATIVE
PH, POC: 6
PROTEIN, POC: NEGATIVE MG/DL
SPECIFIC GRAVITY, POC: 1.01
UROBILINOGEN, POC: 0.2 MG/DL

## 2025-04-06 RX ORDER — METHYLPREDNISOLONE 4 MG/1
TABLET ORAL
Qty: 1 KIT | Refills: 0 | Status: SHIPPED | OUTPATIENT
Start: 2025-04-06

## 2025-04-07 LAB — BACTERIA UR CULT: NORMAL

## 2025-04-08 ENCOUNTER — RESULTS FOLLOW-UP (OUTPATIENT)
Age: 80
End: 2025-04-08